# Patient Record
Sex: FEMALE | Race: WHITE | NOT HISPANIC OR LATINO
[De-identification: names, ages, dates, MRNs, and addresses within clinical notes are randomized per-mention and may not be internally consistent; named-entity substitution may affect disease eponyms.]

---

## 2016-04-06 RX ORDER — DOCUSATE SODIUM 100 MG
1 CAPSULE ORAL
Qty: 0 | Refills: 0 | DISCHARGE
Start: 2016-04-06

## 2017-04-11 ENCOUNTER — APPOINTMENT (OUTPATIENT)
Dept: VASCULAR SURGERY | Facility: CLINIC | Age: 72
End: 2017-04-11

## 2017-04-11 VITALS
HEART RATE: 74 BPM | SYSTOLIC BLOOD PRESSURE: 152 MMHG | DIASTOLIC BLOOD PRESSURE: 78 MMHG | HEIGHT: 64 IN | BODY MASS INDEX: 50.02 KG/M2 | WEIGHT: 293 LBS

## 2017-08-08 ENCOUNTER — APPOINTMENT (OUTPATIENT)
Dept: VASCULAR SURGERY | Facility: CLINIC | Age: 72
End: 2017-08-08
Payer: MEDICARE

## 2017-08-08 VITALS
BODY MASS INDEX: 50.02 KG/M2 | TEMPERATURE: 98.9 F | DIASTOLIC BLOOD PRESSURE: 84 MMHG | WEIGHT: 293 LBS | SYSTOLIC BLOOD PRESSURE: 138 MMHG | HEIGHT: 64 IN | HEART RATE: 70 BPM

## 2017-08-08 PROCEDURE — 99213 OFFICE O/P EST LOW 20 MIN: CPT

## 2018-01-30 ENCOUNTER — APPOINTMENT (OUTPATIENT)
Dept: VASCULAR SURGERY | Facility: CLINIC | Age: 73
End: 2018-01-30

## 2018-02-20 ENCOUNTER — APPOINTMENT (OUTPATIENT)
Dept: VASCULAR SURGERY | Facility: CLINIC | Age: 73
End: 2018-02-20
Payer: MEDICARE

## 2018-02-20 VITALS — HEIGHT: 64 IN | SYSTOLIC BLOOD PRESSURE: 141 MMHG | DIASTOLIC BLOOD PRESSURE: 93 MMHG | HEART RATE: 73 BPM

## 2018-02-20 PROCEDURE — 99213 OFFICE O/P EST LOW 20 MIN: CPT

## 2018-07-09 ENCOUNTER — APPOINTMENT (OUTPATIENT)
Dept: VASCULAR SURGERY | Facility: CLINIC | Age: 73
End: 2018-07-09
Payer: MEDICARE

## 2018-07-09 VITALS
TEMPERATURE: 98 F | HEIGHT: 64 IN | DIASTOLIC BLOOD PRESSURE: 67 MMHG | BODY MASS INDEX: 50.02 KG/M2 | WEIGHT: 293 LBS | SYSTOLIC BLOOD PRESSURE: 164 MMHG | HEART RATE: 77 BPM

## 2018-07-09 PROCEDURE — 99213 OFFICE O/P EST LOW 20 MIN: CPT

## 2018-10-02 ENCOUNTER — APPOINTMENT (OUTPATIENT)
Dept: VASCULAR SURGERY | Facility: CLINIC | Age: 73
End: 2018-10-02

## 2019-05-01 ENCOUNTER — APPOINTMENT (OUTPATIENT)
Dept: VASCULAR SURGERY | Facility: CLINIC | Age: 74
End: 2019-05-01
Payer: MEDICARE

## 2019-05-01 VITALS
WEIGHT: 293 LBS | BODY MASS INDEX: 50.02 KG/M2 | TEMPERATURE: 98 F | HEIGHT: 64 IN | DIASTOLIC BLOOD PRESSURE: 80 MMHG | HEART RATE: 81 BPM | SYSTOLIC BLOOD PRESSURE: 163 MMHG

## 2019-05-01 PROCEDURE — 93970 EXTREMITY STUDY: CPT

## 2019-05-01 PROCEDURE — 93979 VASCULAR STUDY: CPT

## 2019-05-01 PROCEDURE — 99214 OFFICE O/P EST MOD 30 MIN: CPT

## 2019-05-14 ENCOUNTER — APPOINTMENT (OUTPATIENT)
Dept: VASCULAR SURGERY | Facility: CLINIC | Age: 74
End: 2019-05-14
Payer: MEDICARE

## 2019-05-14 VITALS
HEART RATE: 76 BPM | TEMPERATURE: 97.6 F | SYSTOLIC BLOOD PRESSURE: 127 MMHG | OXYGEN SATURATION: 81 % | HEIGHT: 64 IN | WEIGHT: 293 LBS | BODY MASS INDEX: 50.02 KG/M2 | DIASTOLIC BLOOD PRESSURE: 61 MMHG

## 2019-05-14 DIAGNOSIS — L85.3 XEROSIS CUTIS: ICD-10-CM

## 2019-05-14 PROCEDURE — 99213 OFFICE O/P EST LOW 20 MIN: CPT

## 2019-08-20 ENCOUNTER — APPOINTMENT (OUTPATIENT)
Dept: VASCULAR SURGERY | Facility: CLINIC | Age: 74
End: 2019-08-20
Payer: MEDICARE

## 2019-08-20 VITALS
SYSTOLIC BLOOD PRESSURE: 148 MMHG | OXYGEN SATURATION: 88 % | BODY MASS INDEX: 50.02 KG/M2 | DIASTOLIC BLOOD PRESSURE: 70 MMHG | TEMPERATURE: 98.9 F | WEIGHT: 293 LBS | HEART RATE: 81 BPM | HEIGHT: 64 IN

## 2019-08-20 DIAGNOSIS — R60.0 LOCALIZED EDEMA: ICD-10-CM

## 2019-08-20 PROCEDURE — 99213 OFFICE O/P EST LOW 20 MIN: CPT

## 2019-08-20 RX ORDER — HALOBETASOL PROPIONATE 0.5 MG/G
0.05 CREAM TOPICAL TWICE DAILY
Qty: 2 | Refills: 4 | Status: ACTIVE | COMMUNITY
Start: 2019-08-20 | End: 1900-01-01

## 2019-09-24 ENCOUNTER — APPOINTMENT (OUTPATIENT)
Dept: VASCULAR SURGERY | Facility: CLINIC | Age: 74
End: 2019-09-24

## 2019-10-15 ENCOUNTER — APPOINTMENT (OUTPATIENT)
Dept: VASCULAR SURGERY | Facility: CLINIC | Age: 74
End: 2019-10-15

## 2020-02-23 ENCOUNTER — INPATIENT (INPATIENT)
Facility: HOSPITAL | Age: 75
LOS: 7 days | Discharge: HOSPICE | DRG: 208 | End: 2020-03-02
Attending: INTERNAL MEDICINE | Admitting: INTERNAL MEDICINE
Payer: MEDICARE

## 2020-02-23 VITALS
HEART RATE: 84 BPM | DIASTOLIC BLOOD PRESSURE: 83 MMHG | RESPIRATION RATE: 18 BRPM | TEMPERATURE: 98 F | WEIGHT: 293 LBS | OXYGEN SATURATION: 97 % | SYSTOLIC BLOOD PRESSURE: 144 MMHG | HEIGHT: 63 IN

## 2020-02-23 DIAGNOSIS — Z98.89 OTHER SPECIFIED POSTPROCEDURAL STATES: Chronic | ICD-10-CM

## 2020-02-23 DIAGNOSIS — Z90.710 ACQUIRED ABSENCE OF BOTH CERVIX AND UTERUS: Chronic | ICD-10-CM

## 2020-02-23 LAB
ALBUMIN SERPL ELPH-MCNC: 3.8 G/DL — SIGNIFICANT CHANGE UP (ref 3.3–5)
ALP SERPL-CCNC: 94 U/L — SIGNIFICANT CHANGE UP (ref 40–120)
ALT FLD-CCNC: 25 U/L — SIGNIFICANT CHANGE UP (ref 10–45)
ANION GAP SERPL CALC-SCNC: 9 MMOL/L — SIGNIFICANT CHANGE UP (ref 5–17)
APTT BLD: 24.7 SEC — LOW (ref 27.5–36.3)
AST SERPL-CCNC: 16 U/L — SIGNIFICANT CHANGE UP (ref 10–40)
BASOPHILS # BLD AUTO: 0 K/UL — SIGNIFICANT CHANGE UP (ref 0–0.2)
BASOPHILS NFR BLD AUTO: 0 % — SIGNIFICANT CHANGE UP (ref 0–2)
BILIRUB SERPL-MCNC: 0.5 MG/DL — SIGNIFICANT CHANGE UP (ref 0.2–1.2)
BUN SERPL-MCNC: 38 MG/DL — HIGH (ref 7–23)
CALCIUM SERPL-MCNC: 9.1 MG/DL — SIGNIFICANT CHANGE UP (ref 8.4–10.5)
CHLORIDE SERPL-SCNC: 105 MMOL/L — SIGNIFICANT CHANGE UP (ref 96–108)
CO2 SERPL-SCNC: 31 MMOL/L — SIGNIFICANT CHANGE UP (ref 22–31)
CREAT SERPL-MCNC: 1.63 MG/DL — HIGH (ref 0.5–1.3)
EOSINOPHIL # BLD AUTO: 0.37 K/UL — SIGNIFICANT CHANGE UP (ref 0–0.5)
EOSINOPHIL NFR BLD AUTO: 4 % — SIGNIFICANT CHANGE UP (ref 0–6)
GAS PNL BLDV: SIGNIFICANT CHANGE UP
GLUCOSE SERPL-MCNC: 138 MG/DL — HIGH (ref 70–99)
HCT VFR BLD CALC: 38.4 % — SIGNIFICANT CHANGE UP (ref 34.5–45)
HGB BLD-MCNC: 10.8 G/DL — LOW (ref 11.5–15.5)
INR BLD: 1.28 RATIO — HIGH (ref 0.88–1.16)
LYMPHOCYTES # BLD AUTO: 0.65 K/UL — LOW (ref 1–3.3)
LYMPHOCYTES # BLD AUTO: 7 % — LOW (ref 13–44)
MCHC RBC-ENTMCNC: 28.1 GM/DL — LOW (ref 32–36)
MCHC RBC-ENTMCNC: 30.1 PG — SIGNIFICANT CHANGE UP (ref 27–34)
MCV RBC AUTO: 107 FL — HIGH (ref 80–100)
MONOCYTES # BLD AUTO: 0.28 K/UL — SIGNIFICANT CHANGE UP (ref 0–0.9)
MONOCYTES NFR BLD AUTO: 3 % — SIGNIFICANT CHANGE UP (ref 2–14)
NEUTROPHILS # BLD AUTO: 7.75 K/UL — HIGH (ref 1.8–7.4)
NEUTROPHILS NFR BLD AUTO: 82 % — HIGH (ref 43–77)
NT-PROBNP SERPL-SCNC: 469 PG/ML — HIGH (ref 0–300)
PLATELET # BLD AUTO: 190 K/UL — SIGNIFICANT CHANGE UP (ref 150–400)
POTASSIUM SERPL-MCNC: 6.4 MMOL/L — CRITICAL HIGH (ref 3.5–5.3)
POTASSIUM SERPL-SCNC: 6.4 MMOL/L — CRITICAL HIGH (ref 3.5–5.3)
PROT SERPL-MCNC: 7.3 G/DL — SIGNIFICANT CHANGE UP (ref 6–8.3)
PROTHROM AB SERPL-ACNC: 14.7 SEC — HIGH (ref 10–12.9)
RBC # BLD: 3.59 M/UL — LOW (ref 3.8–5.2)
RBC # FLD: 17.5 % — HIGH (ref 10.3–14.5)
SODIUM SERPL-SCNC: 145 MMOL/L — SIGNIFICANT CHANGE UP (ref 135–145)
TROPONIN T, HIGH SENSITIVITY RESULT: 11 NG/L — SIGNIFICANT CHANGE UP (ref 0–51)
TROPONIN T, HIGH SENSITIVITY RESULT: 11 NG/L — SIGNIFICANT CHANGE UP (ref 0–51)
WBC # BLD: 9.23 K/UL — SIGNIFICANT CHANGE UP (ref 3.8–10.5)
WBC # FLD AUTO: 9.23 K/UL — SIGNIFICANT CHANGE UP (ref 3.8–10.5)

## 2020-02-23 PROCEDURE — 99291 CRITICAL CARE FIRST HOUR: CPT | Mod: GC

## 2020-02-23 PROCEDURE — 71045 X-RAY EXAM CHEST 1 VIEW: CPT | Mod: 26

## 2020-02-23 RX ORDER — FUROSEMIDE 40 MG
20 TABLET ORAL ONCE
Refills: 0 | Status: COMPLETED | OUTPATIENT
Start: 2020-02-23 | End: 2020-02-23

## 2020-02-23 RX ORDER — DEXTROSE 50 % IN WATER 50 %
25 SYRINGE (ML) INTRAVENOUS ONCE
Refills: 0 | Status: COMPLETED | OUTPATIENT
Start: 2020-02-23 | End: 2020-02-23

## 2020-02-23 RX ORDER — SODIUM ZIRCONIUM CYCLOSILICATE 10 G/10G
10 POWDER, FOR SUSPENSION ORAL ONCE
Refills: 0 | Status: DISCONTINUED | OUTPATIENT
Start: 2020-02-23 | End: 2020-02-23

## 2020-02-23 RX ORDER — IPRATROPIUM/ALBUTEROL SULFATE 18-103MCG
3 AEROSOL WITH ADAPTER (GRAM) INHALATION ONCE
Refills: 0 | Status: COMPLETED | OUTPATIENT
Start: 2020-02-23 | End: 2020-02-23

## 2020-02-23 RX ORDER — INSULIN HUMAN 100 [IU]/ML
10 INJECTION, SOLUTION SUBCUTANEOUS ONCE
Refills: 0 | Status: COMPLETED | OUTPATIENT
Start: 2020-02-23 | End: 2020-02-23

## 2020-02-23 RX ADMIN — Medication 3 MILLILITER(S): at 20:42

## 2020-02-23 RX ADMIN — Medication 20 MILLIGRAM(S): at 21:33

## 2020-02-23 RX ADMIN — INSULIN HUMAN 10 UNIT(S): 100 INJECTION, SOLUTION SUBCUTANEOUS at 21:28

## 2020-02-23 RX ADMIN — Medication 25 MILLILITER(S): at 21:28

## 2020-02-23 NOTE — ED ADULT NURSE REASSESSMENT NOTE - NS ED NURSE REASSESS COMMENT FT1
Obrien catheter inserted using sterile technique. Second RN present to confirm sterility. Patient tolerated well. Bedside drainage to gravity. Stat lock in place.

## 2020-02-23 NOTE — ED PROVIDER NOTE - PMH
Diabetes mellitus type 2, controlled    HLD (hyperlipidemia)    Hypertension    Obesity hypoventilation syndrome    Obesity, morbid    FREDI on CPAP    Ventral hernia

## 2020-02-23 NOTE — ED PROVIDER NOTE - OBJECTIVE STATEMENT
73 y/o female h/o tanner obesity hypoventilation syndrome, ckd3, dm2, LE lymphedema, on CPAP at night but intermittently compliant, pw worsening SOB and LE edema. Pt denies cough or recent URI sx, denies f/c/n/v/d. Pt reports worsening SOB at rest and with exertion. Pt found to be hypoxic in the 70s when EMS arrived, and improved to 100% O2 w/ NRB. Pt reports that she has been noncompliant with her CPAP for the last several weeks as her  has been in the hospital for a hip replacement and she does not know how to put on her CPAP mask or work the machine.

## 2020-02-23 NOTE — ED PROVIDER NOTE - CARE PLAN
Principal Discharge DX:	Shortness of breath Principal Discharge DX:	Shortness of breath  Secondary Diagnosis:	Hypercapnemia  Secondary Diagnosis:	Obesity hypoventilation syndrome  Secondary Diagnosis:	Acidemia

## 2020-02-23 NOTE — ED PROVIDER NOTE - CLINICAL SUMMARY MEDICAL DECISION MAKING FREE TEXT BOX
Pt w/ severe obesity hypoventilation syndrome and sleep apnea, dm, ckd, p/w worsening SOB and  and worsening hypoxia, likely 2/2 stopping use of CPAP recently, pt lung exam relatively benign besides mild rales, will obtain cardiac w/u but unlikely fluid overload or ACS as etiology, pt to require admission for pulmonary w/u and additional cardiac w/u. Pt hypoxia improving on O2 NC here, no concern for impending resp. collapse at this time -[place] Pt w/ severe obesity hypoventilation syndrome and sleep apnea, dm, ckd, p/w worsening SOB and  and worsening hypoxia, likely 2/2 stopping use of CPAP recently, pt lung exam relatively benign besides mild rales, will obtain cardiac w/u but unlikely fluid overload or ACS as etiology, pt to require admission for pulmonary w/u and additional cardiac w/u. Pt hypoxia improving on O2 NC here, no concern for impending resp. collapse at this time -Balbir Sanchez MD: Pt is a 75 y/o female with PMH FREDI, obesity hypoventilation syndrome, ckd3, dm2, LE lymphedema, on CPAP at night but intermittently compliant, pw worsening SOB and LE edema. Pt denies cough or recent URI sx, denies f/c/n/v/d. Pt reports worsening SOB at rest and with exertion. Pt found to be hypoxic in the 70s when EMS arrived, and improved to 100% O2 w/ NRB. Pt reports that she has been noncompliant with her CPAP for the last several weeks as her  has been in the hospital for a hip replacement and she does not know how to put on her CPAP mask or work the machine. Denies f/c. Pt with chronic lymphedema to b/l LE, states no different from normal. No recent hospitalizations, trauma. Pt placed on BIPAP on arrival for hypoxia and lethargy concerning for possible hypercapnea. Ddx includes, however, is not limited to: obesity hyperventilation syndrome, hypercarbic respiratory failure, CHF, PNA, viral syndrome, ACS, COPD, other. Plan: basic labs, vbg, cardiac w/u, PE w/u with CTA if creatinine allows, MICU consult, TBA likely ICU setting

## 2020-02-23 NOTE — ED ADULT NURSE NOTE - OBJECTIVE STATEMENT
74 year old female with a PMH of HTN, DM, CKD, and morbid obesity comes to the ED c/o SOB. Patient states acute onset of SOB began today at rest and became progressively worse throughout the day and became increasingly weak and drowsy. Per EMS, patient was satting 72% on RA, patient up to 100% after NRB placed. Upon arrival, patient O2 sat 68% on RA, patient placed on 6L NC and O2 came back up to 92. Per family, patient has not been compliant with daily medications, oxygen therapy, CPAP machine since  has been in hospital and rehab. Patient states, "I don't know how to put the mask on." Shallow, tachypneic respirations noted upon arrival, lung sounds CTA. Abdomen is round, soft, nontender and nondistended. Peripheral pulses are present with 3+ pitting edema noted b/l to LE. Skin is warm, dry and intact with no breakdown noted. Patient denies CP, f/c, URI symptoms/cough, n/v/d, dizziness/lightheadedness, numbness/tingling/weakness, hematuria/dysuria/frequency at this time.

## 2020-02-23 NOTE — ED PROVIDER NOTE - PROGRESS NOTE DETAILS
Balbir, pgy3: pt blood gas not improving after BIPAP, still significantly acididotic and hypercapneic, although pt likely chronic retainer. Will have MICU eval pt, and in meantime go up on IPAP settings Balbir, pgy3: minimal improvement on blood gas despite increasing BIPAP settings, MICU accepting patient

## 2020-02-23 NOTE — ED PROVIDER NOTE - PSH
S/P hernia repair  ventral hernia repair Jan 2016  S/P hernia repair  First ventral hernia repair in 1996 at [facility], Second ventral hernia repair with mesh at [facility] in 2007  S/P hysterectomy

## 2020-02-24 DIAGNOSIS — R06.02 SHORTNESS OF BREATH: ICD-10-CM

## 2020-02-24 LAB
ALBUMIN SERPL ELPH-MCNC: 3.4 G/DL — SIGNIFICANT CHANGE UP (ref 3.3–5)
ALBUMIN SERPL ELPH-MCNC: 3.5 G/DL — SIGNIFICANT CHANGE UP (ref 3.3–5)
ALP SERPL-CCNC: 84 U/L — SIGNIFICANT CHANGE UP (ref 40–120)
ALP SERPL-CCNC: 85 U/L — SIGNIFICANT CHANGE UP (ref 40–120)
ALT FLD-CCNC: 19 U/L — SIGNIFICANT CHANGE UP (ref 10–45)
ALT FLD-CCNC: 22 U/L — SIGNIFICANT CHANGE UP (ref 10–45)
ANION GAP SERPL CALC-SCNC: 11 MMOL/L — SIGNIFICANT CHANGE UP (ref 5–17)
ANION GAP SERPL CALC-SCNC: 5 MMOL/L — SIGNIFICANT CHANGE UP (ref 5–17)
ANISOCYTOSIS BLD QL: SLIGHT — SIGNIFICANT CHANGE UP
AST SERPL-CCNC: 13 U/L — SIGNIFICANT CHANGE UP (ref 10–40)
AST SERPL-CCNC: 15 U/L — SIGNIFICANT CHANGE UP (ref 10–40)
BASE EXCESS BLDA CALC-SCNC: 3.4 MMOL/L — HIGH (ref -2–2)
BASE EXCESS BLDA CALC-SCNC: 4.5 MMOL/L — HIGH (ref -2–2)
BASOPHILS # BLD AUTO: 0 K/UL — SIGNIFICANT CHANGE UP (ref 0–0.2)
BASOPHILS NFR BLD AUTO: 0 % — SIGNIFICANT CHANGE UP (ref 0–2)
BILIRUB SERPL-MCNC: 0.5 MG/DL — SIGNIFICANT CHANGE UP (ref 0.2–1.2)
BILIRUB SERPL-MCNC: 0.6 MG/DL — SIGNIFICANT CHANGE UP (ref 0.2–1.2)
BUN SERPL-MCNC: 38 MG/DL — HIGH (ref 7–23)
BUN SERPL-MCNC: 41 MG/DL — HIGH (ref 7–23)
CALCIUM SERPL-MCNC: 9 MG/DL — SIGNIFICANT CHANGE UP (ref 8.4–10.5)
CALCIUM SERPL-MCNC: 9.2 MG/DL — SIGNIFICANT CHANGE UP (ref 8.4–10.5)
CHLORIDE SERPL-SCNC: 106 MMOL/L — SIGNIFICANT CHANGE UP (ref 96–108)
CHLORIDE SERPL-SCNC: 108 MMOL/L — SIGNIFICANT CHANGE UP (ref 96–108)
CO2 BLDA-SCNC: 35 MMOL/L — HIGH (ref 22–30)
CO2 BLDA-SCNC: 37 MMOL/L — HIGH (ref 22–30)
CO2 SERPL-SCNC: 27 MMOL/L — SIGNIFICANT CHANGE UP (ref 22–31)
CO2 SERPL-SCNC: 31 MMOL/L — SIGNIFICANT CHANGE UP (ref 22–31)
CREAT SERPL-MCNC: 1.56 MG/DL — HIGH (ref 0.5–1.3)
CREAT SERPL-MCNC: 1.79 MG/DL — HIGH (ref 0.5–1.3)
ELLIPTOCYTES BLD QL SMEAR: SLIGHT — SIGNIFICANT CHANGE UP
EOSINOPHIL # BLD AUTO: 0 K/UL — SIGNIFICANT CHANGE UP (ref 0–0.5)
EOSINOPHIL NFR BLD AUTO: 0 % — SIGNIFICANT CHANGE UP (ref 0–6)
ESTIMATED AVERAGE GLUCOSE: 146 MG/DL — HIGH (ref 68–114)
GAS PNL BLDA: SIGNIFICANT CHANGE UP
GLUCOSE BLDC GLUCOMTR-MCNC: 146 MG/DL — HIGH (ref 70–99)
GLUCOSE BLDC GLUCOMTR-MCNC: 91 MG/DL — SIGNIFICANT CHANGE UP (ref 70–99)
GLUCOSE BLDC GLUCOMTR-MCNC: 93 MG/DL — SIGNIFICANT CHANGE UP (ref 70–99)
GLUCOSE SERPL-MCNC: 108 MG/DL — HIGH (ref 70–99)
GLUCOSE SERPL-MCNC: 87 MG/DL — SIGNIFICANT CHANGE UP (ref 70–99)
HBA1C BLD-MCNC: 6.7 % — HIGH (ref 4–5.6)
HCO3 BLDA-SCNC: 32 MMOL/L — HIGH (ref 21–29)
HCO3 BLDA-SCNC: 34 MMOL/L — HIGH (ref 21–29)
HCT VFR BLD CALC: 36.5 % — SIGNIFICANT CHANGE UP (ref 34.5–45)
HCV AB S/CO SERPL IA: 0.14 S/CO — SIGNIFICANT CHANGE UP (ref 0–0.99)
HCV AB SERPL-IMP: SIGNIFICANT CHANGE UP
HGB BLD-MCNC: 10.2 G/DL — LOW (ref 11.5–15.5)
HOROWITZ INDEX BLDA+IHG-RTO: 40 — SIGNIFICANT CHANGE UP
HOROWITZ INDEX BLDA+IHG-RTO: 50 — SIGNIFICANT CHANGE UP
INTUBATED: SIGNIFICANT CHANGE UP
INTUBATED: YES — SIGNIFICANT CHANGE UP
LYMPHOCYTES # BLD AUTO: 0.45 K/UL — LOW (ref 1–3.3)
LYMPHOCYTES # BLD AUTO: 5.2 % — LOW (ref 13–44)
MACROCYTES BLD QL: SIGNIFICANT CHANGE UP
MAGNESIUM SERPL-MCNC: 2.4 MG/DL — SIGNIFICANT CHANGE UP (ref 1.6–2.6)
MAGNESIUM SERPL-MCNC: 2.4 MG/DL — SIGNIFICANT CHANGE UP (ref 1.6–2.6)
MANUAL SMEAR VERIFICATION: SIGNIFICANT CHANGE UP
MCHC RBC-ENTMCNC: 27.9 GM/DL — LOW (ref 32–36)
MCHC RBC-ENTMCNC: 30 PG — SIGNIFICANT CHANGE UP (ref 27–34)
MCV RBC AUTO: 107.4 FL — HIGH (ref 80–100)
MONOCYTES # BLD AUTO: 0.68 K/UL — SIGNIFICANT CHANGE UP (ref 0–0.9)
MONOCYTES NFR BLD AUTO: 7.8 % — SIGNIFICANT CHANGE UP (ref 2–14)
NEUTROPHILS # BLD AUTO: 7.51 K/UL — HIGH (ref 1.8–7.4)
NEUTROPHILS NFR BLD AUTO: 86.1 % — HIGH (ref 43–77)
OVALOCYTES BLD QL SMEAR: SLIGHT — SIGNIFICANT CHANGE UP
PCO2 BLDA: 83 MMHG — CRITICAL HIGH (ref 32–46)
PCO2 BLDA: 90 MMHG — CRITICAL HIGH (ref 32–46)
PH BLDA: 7.21 — LOW (ref 7.35–7.45)
PH BLDA: 7.22 — LOW (ref 7.35–7.45)
PHOSPHATE SERPL-MCNC: 4.7 MG/DL — HIGH (ref 2.5–4.5)
PHOSPHATE SERPL-MCNC: 5.8 MG/DL — HIGH (ref 2.5–4.5)
PLAT MORPH BLD: NORMAL — SIGNIFICANT CHANGE UP
PLATELET # BLD AUTO: 194 K/UL — SIGNIFICANT CHANGE UP (ref 150–400)
PO2 BLDA: 133 MMHG — HIGH (ref 74–108)
PO2 BLDA: 98 MMHG — SIGNIFICANT CHANGE UP (ref 74–108)
POLYCHROMASIA BLD QL SMEAR: SLIGHT — SIGNIFICANT CHANGE UP
POTASSIUM SERPL-MCNC: 5.6 MMOL/L — HIGH (ref 3.5–5.3)
POTASSIUM SERPL-MCNC: 6.2 MMOL/L — CRITICAL HIGH (ref 3.5–5.3)
POTASSIUM SERPL-SCNC: 5.6 MMOL/L — HIGH (ref 3.5–5.3)
POTASSIUM SERPL-SCNC: 6.2 MMOL/L — CRITICAL HIGH (ref 3.5–5.3)
PROT SERPL-MCNC: 6.8 G/DL — SIGNIFICANT CHANGE UP (ref 6–8.3)
PROT SERPL-MCNC: 7.2 G/DL — SIGNIFICANT CHANGE UP (ref 6–8.3)
RBC # BLD: 3.4 M/UL — LOW (ref 3.8–5.2)
RBC # FLD: 17.6 % — HIGH (ref 10.3–14.5)
RBC BLD AUTO: ABNORMAL
SAO2 % BLDA: 97 % — HIGH (ref 92–96)
SAO2 % BLDA: 99 % — HIGH (ref 92–96)
SODIUM SERPL-SCNC: 142 MMOL/L — SIGNIFICANT CHANGE UP (ref 135–145)
SODIUM SERPL-SCNC: 146 MMOL/L — HIGH (ref 135–145)
VARIANT LYMPHS # BLD: 0.9 % — SIGNIFICANT CHANGE UP (ref 0–6)
WBC # BLD: 8.72 K/UL — SIGNIFICANT CHANGE UP (ref 3.8–10.5)
WBC # FLD AUTO: 8.72 K/UL — SIGNIFICANT CHANGE UP (ref 3.8–10.5)

## 2020-02-24 PROCEDURE — 99291 CRITICAL CARE FIRST HOUR: CPT | Mod: 25

## 2020-02-24 PROCEDURE — 76604 US EXAM CHEST: CPT | Mod: 26,GC

## 2020-02-24 PROCEDURE — 71045 X-RAY EXAM CHEST 1 VIEW: CPT | Mod: 26

## 2020-02-24 PROCEDURE — 93306 TTE W/DOPPLER COMPLETE: CPT | Mod: 26

## 2020-02-24 PROCEDURE — 99291 CRITICAL CARE FIRST HOUR: CPT

## 2020-02-24 PROCEDURE — 31500 INSERT EMERGENCY AIRWAY: CPT | Mod: GC,59

## 2020-02-24 RX ORDER — DEXTROSE 50 % IN WATER 50 %
12.5 SYRINGE (ML) INTRAVENOUS ONCE
Refills: 0 | Status: DISCONTINUED | OUTPATIENT
Start: 2020-02-24 | End: 2020-03-02

## 2020-02-24 RX ORDER — FUROSEMIDE 40 MG
20 TABLET ORAL DAILY
Refills: 0 | Status: DISCONTINUED | OUTPATIENT
Start: 2020-02-24 | End: 2020-02-26

## 2020-02-24 RX ORDER — FUROSEMIDE 40 MG
40 TABLET ORAL ONCE
Refills: 0 | Status: COMPLETED | OUTPATIENT
Start: 2020-02-24 | End: 2020-02-24

## 2020-02-24 RX ORDER — METOPROLOL TARTRATE 50 MG
50 TABLET ORAL DAILY
Refills: 0 | Status: DISCONTINUED | OUTPATIENT
Start: 2020-02-24 | End: 2020-03-02

## 2020-02-24 RX ORDER — ASPIRIN/CALCIUM CARB/MAGNESIUM 324 MG
81 TABLET ORAL DAILY
Refills: 0 | Status: DISCONTINUED | OUTPATIENT
Start: 2020-02-24 | End: 2020-03-02

## 2020-02-24 RX ORDER — ALLOPURINOL 300 MG
300 TABLET ORAL DAILY
Refills: 0 | Status: DISCONTINUED | OUTPATIENT
Start: 2020-02-24 | End: 2020-03-02

## 2020-02-24 RX ORDER — DEXTROSE 50 % IN WATER 50 %
15 SYRINGE (ML) INTRAVENOUS ONCE
Refills: 0 | Status: DISCONTINUED | OUTPATIENT
Start: 2020-02-24 | End: 2020-03-02

## 2020-02-24 RX ORDER — CHLORHEXIDINE GLUCONATE 213 G/1000ML
1 SOLUTION TOPICAL
Refills: 0 | Status: DISCONTINUED | OUTPATIENT
Start: 2020-02-24 | End: 2020-02-26

## 2020-02-24 RX ORDER — PROPOFOL 10 MG/ML
15 INJECTION, EMULSION INTRAVENOUS
Qty: 500 | Refills: 0 | Status: DISCONTINUED | OUTPATIENT
Start: 2020-02-24 | End: 2020-02-25

## 2020-02-24 RX ORDER — LEVOTHYROXINE SODIUM 125 MCG
25 TABLET ORAL DAILY
Refills: 0 | Status: DISCONTINUED | OUTPATIENT
Start: 2020-02-24 | End: 2020-02-24

## 2020-02-24 RX ORDER — GLUCAGON INJECTION, SOLUTION 0.5 MG/.1ML
1 INJECTION, SOLUTION SUBCUTANEOUS ONCE
Refills: 0 | Status: DISCONTINUED | OUTPATIENT
Start: 2020-02-24 | End: 2020-03-02

## 2020-02-24 RX ORDER — LEVOTHYROXINE SODIUM 125 MCG
1 TABLET ORAL
Qty: 0 | Refills: 0 | DISCHARGE

## 2020-02-24 RX ORDER — INSULIN LISPRO 100/ML
VIAL (ML) SUBCUTANEOUS EVERY 6 HOURS
Refills: 0 | Status: DISCONTINUED | OUTPATIENT
Start: 2020-02-24 | End: 2020-02-25

## 2020-02-24 RX ORDER — HEPARIN SODIUM 5000 [USP'U]/ML
7500 INJECTION INTRAVENOUS; SUBCUTANEOUS EVERY 8 HOURS
Refills: 0 | Status: DISCONTINUED | OUTPATIENT
Start: 2020-02-24 | End: 2020-03-02

## 2020-02-24 RX ORDER — FUROSEMIDE 40 MG
40 TABLET ORAL DAILY
Refills: 0 | Status: DISCONTINUED | OUTPATIENT
Start: 2020-02-24 | End: 2020-02-24

## 2020-02-24 RX ORDER — ATORVASTATIN CALCIUM 80 MG/1
10 TABLET, FILM COATED ORAL AT BEDTIME
Refills: 0 | Status: DISCONTINUED | OUTPATIENT
Start: 2020-02-24 | End: 2020-03-02

## 2020-02-24 RX ORDER — NYSTATIN/TRIAMCINOLONE ACET
1 OINTMENT (GRAM) TOPICAL
Refills: 0 | Status: DISCONTINUED | OUTPATIENT
Start: 2020-02-24 | End: 2020-02-25

## 2020-02-24 RX ORDER — INSULIN LISPRO 100/ML
VIAL (ML) SUBCUTANEOUS
Refills: 0 | Status: DISCONTINUED | OUTPATIENT
Start: 2020-02-24 | End: 2020-02-24

## 2020-02-24 RX ORDER — LEVOTHYROXINE SODIUM 125 MCG
12.5 TABLET ORAL AT BEDTIME
Refills: 0 | Status: DISCONTINUED | OUTPATIENT
Start: 2020-02-24 | End: 2020-02-26

## 2020-02-24 RX ORDER — CHLORHEXIDINE GLUCONATE 213 G/1000ML
15 SOLUTION TOPICAL EVERY 12 HOURS
Refills: 0 | Status: DISCONTINUED | OUTPATIENT
Start: 2020-02-24 | End: 2020-02-25

## 2020-02-24 RX ORDER — MIDAZOLAM HYDROCHLORIDE 1 MG/ML
6 INJECTION, SOLUTION INTRAMUSCULAR; INTRAVENOUS ONCE
Refills: 0 | Status: DISCONTINUED | OUTPATIENT
Start: 2020-02-24 | End: 2020-02-24

## 2020-02-24 RX ORDER — SODIUM ZIRCONIUM CYCLOSILICATE 10 G/10G
10 POWDER, FOR SUSPENSION ORAL ONCE
Refills: 0 | Status: COMPLETED | OUTPATIENT
Start: 2020-02-24 | End: 2020-02-24

## 2020-02-24 RX ORDER — DOXAZOSIN MESYLATE 4 MG
4 TABLET ORAL AT BEDTIME
Refills: 0 | Status: DISCONTINUED | OUTPATIENT
Start: 2020-02-24 | End: 2020-02-26

## 2020-02-24 RX ORDER — SODIUM CHLORIDE 9 MG/ML
1000 INJECTION, SOLUTION INTRAVENOUS
Refills: 0 | Status: DISCONTINUED | OUTPATIENT
Start: 2020-02-24 | End: 2020-03-02

## 2020-02-24 RX ORDER — NOREPINEPHRINE BITARTRATE/D5W 8 MG/250ML
0.15 PLASTIC BAG, INJECTION (ML) INTRAVENOUS
Qty: 8 | Refills: 0 | Status: DISCONTINUED | OUTPATIENT
Start: 2020-02-24 | End: 2020-02-25

## 2020-02-24 RX ADMIN — PROPOFOL 13.66 MICROGRAM(S)/KG/MIN: 10 INJECTION, EMULSION INTRAVENOUS at 15:31

## 2020-02-24 RX ADMIN — PROPOFOL 13.66 MICROGRAM(S)/KG/MIN: 10 INJECTION, EMULSION INTRAVENOUS at 21:04

## 2020-02-24 RX ADMIN — HEPARIN SODIUM 7500 UNIT(S): 5000 INJECTION INTRAVENOUS; SUBCUTANEOUS at 21:03

## 2020-02-24 RX ADMIN — CHLORHEXIDINE GLUCONATE 15 MILLILITER(S): 213 SOLUTION TOPICAL at 17:04

## 2020-02-24 RX ADMIN — HEPARIN SODIUM 7500 UNIT(S): 5000 INJECTION INTRAVENOUS; SUBCUTANEOUS at 05:27

## 2020-02-24 RX ADMIN — HEPARIN SODIUM 7500 UNIT(S): 5000 INJECTION INTRAVENOUS; SUBCUTANEOUS at 14:03

## 2020-02-24 RX ADMIN — Medication 4 MILLIGRAM(S): at 21:03

## 2020-02-24 RX ADMIN — Medication 20 MILLIGRAM(S): at 00:29

## 2020-02-24 RX ADMIN — Medication 42.69 MICROGRAM(S)/KG/MIN: at 15:31

## 2020-02-24 RX ADMIN — Medication 12.5 MICROGRAM(S): at 21:52

## 2020-02-24 RX ADMIN — CHLORHEXIDINE GLUCONATE 1 APPLICATION(S): 213 SOLUTION TOPICAL at 05:22

## 2020-02-24 RX ADMIN — ATORVASTATIN CALCIUM 10 MILLIGRAM(S): 80 TABLET, FILM COATED ORAL at 21:03

## 2020-02-24 RX ADMIN — Medication 40 MILLIGRAM(S): at 18:08

## 2020-02-24 RX ADMIN — MIDAZOLAM HYDROCHLORIDE 6 MILLIGRAM(S): 1 INJECTION, SOLUTION INTRAMUSCULAR; INTRAVENOUS at 15:25

## 2020-02-24 RX ADMIN — Medication 0.2 MILLIGRAM(S): at 21:03

## 2020-02-24 NOTE — CONSULT NOTE ADULT - ASSESSMENT
75 y/o female PMHx FREDI, obesity hypoventilation syndrome, DM2, HLD, LE lymphedema, on CPAP at night recently noncompliant, presents with worsening SOB and LE edema.    # acute hypercapnic and hypoxic resp failure   likely 2/2 recent non compliance  appreciate ICU care  cont NIV  serial ABGs  pulmonologist Dr. Porter    # LANE  last creat 1.2 in 12/2019  cont to trend  strict i os    # HTN   on clonidine and toprol XL, may need to hold as BP borderline, cont to trend  IV lasix if BP can tolerate  ordered for TTE  CXR pulm edema    # hypothyroidism  takes synthroid 25mcg qd per [facility] EMR    # DM2   hold oral meds  SSI  check a1c    PCP Dr Echavarria ([facility]) - will update    Thank you for this consult. Please call [facility] with questions [phone].

## 2020-02-24 NOTE — CHART NOTE - NSCHARTNOTEFT_GEN_A_CORE
: Dr. Stephens    INDICATION: respiratory failure    PROCEDURE:  [ ] LIMITED ECHO  [x] LIMITED CHEST  [ ] LIMITED RETROPERITONEAL  [ ] LIMITED ABDOMINAL  [ ] LIMITED DVT  [ ] NEEDLE GUIDANCE VASCULAR  [ ] NEEDLE GUIDANCE THORACENTESIS  [ ] NEEDLE GUIDANCE PARACENTESIS  [ ] NEEDLE GUIDANCE PERICARDIOCENTESIS  [ ] OTHER    FINDINGS:  A line pattern anteriorly b/l  Minimal B lines at lung bases    INTERPRETATION:  Normal aeration pattern with basilar atelectasis    Isma Stephens PGY-4  Pulmonary/Critical Care Fellow  Pager: [phone] (SANDY) [phone] (NS)  Pulmonary Spectra #[id number] (NS) / [phone] (SANDY)

## 2020-02-24 NOTE — H&P ADULT - NSHPLABSRESULTS_GEN_ALL_CORE
10.8   9.23  )-----------( 190      ( 23 Feb 2020 20:17 )             38.4       02-23    146<H>  |  106  |  38<H>  ----------------------------<  162<H>  5.5<H>   |  31  |  1.58<H>    Ca    8.9      23 Feb 2020 21:59    TPro  6.9  /  Alb  3.6  /  TBili  0.5  /  DBili  x   /  AST  13  /  ALT  24  /  AlkPhos  89  02-23      PT/INR - ( 23 Feb 2020 20:26 )   PT: 14.7 sec;   INR: 1.28 ratio         PTT - ( 23 Feb 2020 20:26 )  PTT:24.7 sec

## 2020-02-24 NOTE — CONSULT NOTE ADULT - SUBJECTIVE AND OBJECTIVE BOX
Patient is a 74y old  Female who presents with a chief complaint of acute hypercarbic respiratory failure (24 Feb 2020 02:57)      HPI:  75 y/o female h/o tanner obesity hypoventilation syndrome, ckd3, dm2, LE lymphedema, on CPAP at night but presents with worsening SOB and LE edema. Pt became acutely SOB at rest today, progressively worsened.  Pt endorses lethargy.  Pt denies cough or recent URI sx, denies cp, f/c/n/v/d.  Pt found to be hypoxic in the 70s on RA when EMS arrived, and improved to 100% O2 w/ NRB. Pt reports that she has been noncompliant with her CPAP for the last several weeks as her  has been in the hospital for a hip replacement and she does not know how to put on her CPAP mask or work the machine. (24 Feb 2020 02:57)      PAST MEDICAL & SURGICAL HISTORY:  Obesity hypoventilation syndrome  Obesity, morbid  TANNER on CPAP  Ventral hernia  HLD (hyperlipidemia)  Diabetes mellitus type 2, controlled  Hypertension  S/P hernia repair: ventral hernia repair Jan 2016  S/P hernia repair: First ventral hernia repair in 1996 at [facility], Second ventral hernia repair with mesh at [facility] in 2007  S/P hysterectomy      FAMILY HISTORY:  Family history of diabetes mellitus in father      SOCIAL HISTORY:    Allergies    codeine (Rash)    Intolerances        Review of Systems:  unable to provide ROS 2/2 lethargy  	    SUBJECTIVE / OVERNIGHT EVENTS:  lethargic on CPAP but arousable. remains on cpap.    Vital Signs Last 24 Hrs  T(C): 37 (24 Feb 2020 08:00), Max: 37 (24 Feb 2020 08:00)  T(F): 98.6 (24 Feb 2020 08:00), Max: 98.6 (24 Feb 2020 08:00)  HR: 83 (24 Feb 2020 11:00) (72 - 87)  BP: 111/59 (24 Feb 2020 11:00) (100/53 - 146/62)  BP(mean): 83 (24 Feb 2020 11:00) (73 - 89)  RR: 25 (24 Feb 2020 11:00) (17 - 30)  SpO2: 93% (24 Feb 2020 11:00) (90% - 100%)  I&O's Summary    23 Feb 2020 07:01  -  24 Feb 2020 07:00  --------------------------------------------------------  IN: 0 mL / OUT: 460 mL / NET: -460 mL    24 Feb 2020 07:01  -  24 Feb 2020 11:28  --------------------------------------------------------  IN: 0 mL / OUT: 135 mL / NET: -135 mL        PHYSICAL EXAM:  GENERAL: NAD, Comfortable on bipap, morbid obesity  HEAD:  Atraumatic, Normocephalic  CHEST/LUNG: decreased BS,   HEART: Regular rate and rhythm; No murmurs, rubs, or gallops  ABDOMEN: Soft, Nontender, Nondistended; Bowel sounds present  Neuro: lethargic but no focal deficit  EXTREMITIES:  2+ Peripheral Pulses, bl lymphedema  SKIN: No rashes or lesions    LABS:                        10.2   8.72  )-----------( 194      ( 24 Feb 2020 04:10 )             36.5     02-24    146<H>  |  108  |  38<H>  ----------------------------<  87  5.6<H>   |  27  |  1.56<H>    Ca    9.0      24 Feb 2020 04:10  Phos  4.7     02-24  Mg     2.4     02-24    TPro  6.8  /  Alb  3.4  /  TBili  0.5  /  DBili  x   /  AST  13  /  ALT  22  /  AlkPhos  85  02-24    PT/INR - ( 23 Feb 2020 20:26 )   PT: 14.7 sec;   INR: 1.28 ratio         PTT - ( 23 Feb 2020 20:26 )  PTT:24.7 sec  CAPILLARY BLOOD GLUCOSE      POCT Blood Glucose.: 91 mg/dL (24 Feb 2020 05:29)  POCT Blood Glucose.: 119 mg/dL (23 Feb 2020 21:26)            RADIOLOGY & ADDITIONAL TESTS:    Imaging Personally Reviewed:  [x] YES  [ ] NO    Consultant(s) Notes Reviewed:  [x] YES  [ ] NO      MEDICATIONS  (STANDING):  allopurinol 300 milliGRAM(s) Oral daily  aspirin  chewable 81 milliGRAM(s) Oral daily  atorvastatin 10 milliGRAM(s) Oral at bedtime  chlorhexidine 4% Liquid 1 Application(s) Topical <User Schedule>  cloNIDine 0.2 milliGRAM(s) Oral daily  dextrose 5%. 1000 milliLiter(s) (50 mL/Hr) IV Continuous <Continuous>  dextrose 50% Injectable 12.5 Gram(s) IV Push once  doxazosin 4 milliGRAM(s) Oral at bedtime  furosemide   Injectable 20 milliGRAM(s) IV Push daily  heparin  Injectable 7500 Unit(s) SubCutaneous every 8 hours  insulin lispro (HumaLOG) corrective regimen sliding scale   SubCutaneous every 6 hours  metoprolol succinate ER 50 milliGRAM(s) Oral daily    MEDICATIONS  (PRN):  dextrose 40% Gel 15 Gram(s) Oral once PRN Blood Glucose LESS THAN 70 milliGRAM(s)/deciLiter  glucagon  Injectable 1 milliGRAM(s) IntraMuscular once PRN Glucose <70 milliGRAM(s)/deciLiter      Care Discussed with Consultants/Other Providers [x] YES  [ ] NO    HEALTH ISSUES - PROBLEM Dx:

## 2020-02-24 NOTE — H&P ADULT - NSICDXPASTSURGICALHX_GEN_ALL_CORE_FT
PAST SURGICAL HISTORY:  S/P hernia repair First ventral hernia repair in 1996 at [facility], Second ventral hernia repair with mesh at [facility] in 2007    S/P hernia repair ventral hernia repair Jan 2016    S/P hysterectomy

## 2020-02-24 NOTE — H&P ADULT - NSICDXPASTMEDICALHX_GEN_ALL_CORE_FT
PAST MEDICAL HISTORY:  Diabetes mellitus type 2, controlled     HLD (hyperlipidemia)     Hypertension     Obesity hypoventilation syndrome     Obesity, morbid     FREDI on CPAP     Ventral hernia

## 2020-02-24 NOTE — H&P ADULT - ATTENDING COMMENTS
Pt is an obese 75 yo WF with h/o FREDI, obesity hypoventilation syndrome on nocturnal CPAP, DM, CKD and LE lymphedema presents with worsening SOB and LE edema. Pt became acutely SOB at rest today, progressively worsened.  Pt endorses lethargy.  Pt denies cough or recent URI sx, denies cp, f/c/n/v/d.  Pt found to be hypoxic in the 70s on RA when EMS arrived, and improved to 100% O2 w/ NRB. Pt reports that she has been noncompliant with her CPAP for the last several weeks as her  has been in the hospital for a hip replacement and she does not know how to put on her CPAP mask or work the machine. Pt is an obese 73 yo WF with h/o FREDI, obesity hypoventilation syndrome on nocturnal CPAP, HTN, HLD, DM, CKD and LE lymphedema presents with worsening SOB, lethargy and LE edema. Pt denies cough or recent URI symptoms. Upon EMS arrival pt found to be hypoxic in the 70s on RA and in the ER pt found to be hypercapneic; pt placed on BiPAP. CXR reveal pulm edema. Pt reports that she has been noncompliant with her CPAP for the last several weeks. Pt's hypercapnia improved minimally on BiPAP. Admitting dx: 1) Hypoxic and acute on chronic hypercapnia 2) Pulm edema           3) Hyperkalemia    Resp: Follow serial ABG and adjust BiPAP  ID: Low threshold to start Abx  CVS: If BP remains stable may resume pt's BB/ Cont diuresis/ Would check Echo  FEN: NPO while on BiPAP/ K borderline elevated; cont to follow  Endo: Follow FS and cover with Lispro  Renal: Follow BUN/Cr and UO  Social: Family at the bedside and updated    CCT: 40 min in conjunction with the resident    *Note pt seen/examined 2/23 and note written on 2/24*

## 2020-02-24 NOTE — CHART NOTE - NSCHARTNOTEFT_GEN_A_CORE
75 y/o female admitted for acute on chronic hypercapnic respiratory failure. PCO2 and acidemia with minimal improvement on BiPAP 17/7.. Pt switch to AVAPS peep 12 and max pressure 40cm H20.  Able to get better tidal volumes with AVAPS. However, pt became less responsive and no longer responsive to deep sternal rub. Abg again showed PC02 > 100. Pt electively intubated with nasal BiPAP in place. Grade 1 view.   Check ABG on current vent settings. Minimal sedation to see if patient wakes up after intubation. No evidence of infection. Will give Lasix. Pt with R > L  leg edema. Family gives h/o lymph edema. Will obtain Ultrasounf=d legs r/o DVT.    I have spent 45 min of critical care time not including procedures.      Priyank Gudino MD

## 2020-02-24 NOTE — H&P ADULT - ASSESSMENT
#Neuro  #CV    #Pulm  BIPAP  serial ABG    #Renal    #GI  NPO while on BIPA    #ID  afebrile, no leukocytosis    #Heme    #Endo #Neuro  lethargic upon admission  - mentating well. no deficits     #CV    #Pulm  FREDI, Obesity hypoventilation syndrome   Acute hypercarbic respiratory failure  - c/w BIPAP  - ABG 7.18/99/35/35  - serial ABGs    #Renal  SCr1.58       #GI  NPO while on BIPAP    #ID  afebrile, no leukocytosis    #Heme  anemia 10.8/38.4    #Endo  DM Type @  - ISS 75 y/o female h/o tanner obesity hypoventilation syndrome, ckd3, dm2, LE lymphedema, noncompliant with CPAP presented with worsening SOB and LE edema admitted for acute on chronic hypercarbic respiratory failure.    #Neuro  lethargic upon admission  - mentating well, no deficits     #CV  HTN  -consider TTE    #Pulm  TANNER, Obesity hypoventilation syndrome, non compliant with CPAP  Acute on chronic hypercarbic respiratory failure  - c/w BIPAP  - ABG 7.18/99/35/35  - serial ABGs    #Renal  SCr1.58    #GI  NPO while on BIPAP    #ID  afebrile, no leukocytosis    #Heme  anemia 10.8/38.4    #Endo  Hx of hypothyroidism on levothyroxine at home , unsure of dose  DM Type 2  - ISS 73 y/o female h/o tanner obesity hypoventilation syndrome, ckd3, dm2, LE lymphedema, noncompliant with CPAP presented with worsening SOB and LE edema admitted for acute on chronic hypercarbic respiratory failure.    #Neuro  lethargic upon admission  - mentating well, no deficits     #CV  HTN  - CXR pulm edema and b/l pleural effusions  - trops WNL,   - f/u TTE  - c/w home HTN meds clonidine 0.2mg. metoprolol 50mg, doxazosin 4mg   - lasix 40mg daily    #Pulm  TANNER, Obesity hypoventilation syndrome, non compliant with CPAP  Acute on chronic hypercarbic respiratory failure  - c/w BIPAP  - ABG 7.18/99/35/35  - serial ABGs  - CXR pulm edema and b/l pleural effusions    #Renal  - baseline in 2016 1.23, upon admission SCr 1.63  - LANE?  - monitor BMP    #GI  NPO while on BIPAP    #ID  afebrile, no leukocytosis    #Heme  anemia 10.8/38.4    #Endo  Hx of hypothyroidism on levothyroxine at home, unsure of dose  DM Type 2  - ISS    #PPX  - HSQ VTE ppx

## 2020-02-24 NOTE — H&P ADULT - NSHPREVIEWOFSYSTEMS_GEN_ALL_CORE

## 2020-02-24 NOTE — H&P ADULT - HISTORY OF PRESENT ILLNESS
75 y/o female h/o tanner obesity hypoventilation syndrome, ckd3, dm2, LE lymphedema, on CPAP at night but intermittently compliant, pw worsening SOB and LE edema. Pt denies cough or recent URI sx, denies f/c/n/v/d. Pt reports worsening SOB at rest and with exertion. Pt found to be hypoxic in the 70s when EMS arrived, and improved to 100% O2 w/ NRB. Pt reports that she has been noncompliant with her CPAP for the last several weeks as her  has been in the hospital for a hip replacement and she does not know how to put on her CPAP mask or work the machine. 73 y/o female h/o tanner obesity hypoventilation syndrome, ckd3, dm2, LE lymphedema, on CPAP at night but presents with worsening SOB and LE edema. Pt became acutely SOB at rest today, progressively worsened.  Pt endorses lethargy.  Pt denies cough or recent URI sx, denies cp, f/c/n/v/d.  Pt found to be hypoxic in the 70s on RA when EMS arrived, and improved to 100% O2 w/ NRB. Pt reports that she has been noncompliant with her CPAP for the last several weeks as her  has been in the hospital for a hip replacement and she does not know how to put on her CPAP mask or work the machine.

## 2020-02-24 NOTE — PROCEDURE NOTE - NSTRACHPOSTINTU_RESP_A_CORE
Appropriate capnography/Breath sounds equal/Chest excursion noted/Positive end tidal Co2 noted/Chest X-Ray/Breath sounds bilateral

## 2020-02-24 NOTE — H&P ADULT - NSHPOUTPATIENTPROVIDERS_GEN_ALL_CORE
PCP: Dr. Jericho Horne  Pulmonologist: Dr. Jose Luis Porter PCP: Dr. Jericho Horne  Pulmonologist: Dr. Ulysses Porter

## 2020-02-25 LAB
ALBUMIN SERPL ELPH-MCNC: 3.1 G/DL — LOW (ref 3.3–5)
ALP SERPL-CCNC: 76 U/L — SIGNIFICANT CHANGE UP (ref 40–120)
ALT FLD-CCNC: 19 U/L — SIGNIFICANT CHANGE UP (ref 10–45)
ANION GAP SERPL CALC-SCNC: 9 MMOL/L — SIGNIFICANT CHANGE UP (ref 5–17)
AST SERPL-CCNC: 20 U/L — SIGNIFICANT CHANGE UP (ref 10–40)
BASOPHILS # BLD AUTO: 0.04 K/UL — SIGNIFICANT CHANGE UP (ref 0–0.2)
BASOPHILS NFR BLD AUTO: 0.4 % — SIGNIFICANT CHANGE UP (ref 0–2)
BILIRUB SERPL-MCNC: 0.8 MG/DL — SIGNIFICANT CHANGE UP (ref 0.2–1.2)
BUN SERPL-MCNC: 47 MG/DL — HIGH (ref 7–23)
CALCIUM SERPL-MCNC: 9.3 MG/DL — SIGNIFICANT CHANGE UP (ref 8.4–10.5)
CHLORIDE SERPL-SCNC: 104 MMOL/L — SIGNIFICANT CHANGE UP (ref 96–108)
CO2 SERPL-SCNC: 29 MMOL/L — SIGNIFICANT CHANGE UP (ref 22–31)
CREAT SERPL-MCNC: 1.76 MG/DL — HIGH (ref 0.5–1.3)
EOSINOPHIL # BLD AUTO: 0.11 K/UL — SIGNIFICANT CHANGE UP (ref 0–0.5)
EOSINOPHIL NFR BLD AUTO: 1 % — SIGNIFICANT CHANGE UP (ref 0–6)
GAS PNL BLDA: SIGNIFICANT CHANGE UP
GLUCOSE BLDC GLUCOMTR-MCNC: 122 MG/DL — HIGH (ref 70–99)
GLUCOSE BLDC GLUCOMTR-MCNC: 130 MG/DL — HIGH (ref 70–99)
GLUCOSE BLDC GLUCOMTR-MCNC: 132 MG/DL — HIGH (ref 70–99)
GLUCOSE BLDC GLUCOMTR-MCNC: 95 MG/DL — SIGNIFICANT CHANGE UP (ref 70–99)
GLUCOSE SERPL-MCNC: 122 MG/DL — HIGH (ref 70–99)
HCT VFR BLD CALC: 33.2 % — LOW (ref 34.5–45)
HGB BLD-MCNC: 9.4 G/DL — LOW (ref 11.5–15.5)
IMM GRANULOCYTES NFR BLD AUTO: 0.5 % — SIGNIFICANT CHANGE UP (ref 0–1.5)
LYMPHOCYTES # BLD AUTO: 0.75 K/UL — LOW (ref 1–3.3)
LYMPHOCYTES # BLD AUTO: 6.7 % — LOW (ref 13–44)
MAGNESIUM SERPL-MCNC: 2.2 MG/DL — SIGNIFICANT CHANGE UP (ref 1.6–2.6)
MCHC RBC-ENTMCNC: 28.3 GM/DL — LOW (ref 32–36)
MCHC RBC-ENTMCNC: 29.5 PG — SIGNIFICANT CHANGE UP (ref 27–34)
MCV RBC AUTO: 104.1 FL — HIGH (ref 80–100)
MONOCYTES # BLD AUTO: 0.83 K/UL — SIGNIFICANT CHANGE UP (ref 0–0.9)
MONOCYTES NFR BLD AUTO: 7.5 % — SIGNIFICANT CHANGE UP (ref 2–14)
NEUTROPHILS # BLD AUTO: 9.33 K/UL — HIGH (ref 1.8–7.4)
NEUTROPHILS NFR BLD AUTO: 83.9 % — HIGH (ref 43–77)
NRBC # BLD: 0 /100 WBCS — SIGNIFICANT CHANGE UP (ref 0–0)
PHOSPHATE SERPL-MCNC: 3.1 MG/DL — SIGNIFICANT CHANGE UP (ref 2.5–4.5)
PLATELET # BLD AUTO: 203 K/UL — SIGNIFICANT CHANGE UP (ref 150–400)
POTASSIUM SERPL-MCNC: 5.4 MMOL/L — HIGH (ref 3.5–5.3)
POTASSIUM SERPL-SCNC: 5.4 MMOL/L — HIGH (ref 3.5–5.3)
PROT SERPL-MCNC: 6.4 G/DL — SIGNIFICANT CHANGE UP (ref 6–8.3)
RBC # BLD: 3.19 M/UL — LOW (ref 3.8–5.2)
RBC # FLD: 17.6 % — HIGH (ref 10.3–14.5)
SODIUM SERPL-SCNC: 142 MMOL/L — SIGNIFICANT CHANGE UP (ref 135–145)
WBC # BLD: 11.12 K/UL — HIGH (ref 3.8–10.5)
WBC # FLD AUTO: 11.12 K/UL — HIGH (ref 3.8–10.5)

## 2020-02-25 PROCEDURE — 99233 SBSQ HOSP IP/OBS HIGH 50: CPT | Mod: GC

## 2020-02-25 RX ORDER — INSULIN LISPRO 100/ML
VIAL (ML) SUBCUTANEOUS
Refills: 0 | Status: DISCONTINUED | OUTPATIENT
Start: 2020-02-25 | End: 2020-03-02

## 2020-02-25 RX ORDER — INSULIN LISPRO 100/ML
VIAL (ML) SUBCUTANEOUS AT BEDTIME
Refills: 0 | Status: DISCONTINUED | OUTPATIENT
Start: 2020-02-25 | End: 2020-03-02

## 2020-02-25 RX ORDER — NYSTATIN CREAM 100000 [USP'U]/G
1 CREAM TOPICAL
Refills: 0 | Status: DISCONTINUED | OUTPATIENT
Start: 2020-02-25 | End: 2020-03-02

## 2020-02-25 RX ADMIN — HEPARIN SODIUM 7500 UNIT(S): 5000 INJECTION INTRAVENOUS; SUBCUTANEOUS at 13:13

## 2020-02-25 RX ADMIN — Medication 300 MILLIGRAM(S): at 12:53

## 2020-02-25 RX ADMIN — Medication 81 MILLIGRAM(S): at 12:53

## 2020-02-25 RX ADMIN — NYSTATIN CREAM 1 APPLICATION(S): 100000 CREAM TOPICAL at 17:46

## 2020-02-25 RX ADMIN — CHLORHEXIDINE GLUCONATE 1 APPLICATION(S): 213 SOLUTION TOPICAL at 05:15

## 2020-02-25 RX ADMIN — Medication 12.5 MICROGRAM(S): at 21:20

## 2020-02-25 RX ADMIN — Medication 50 MILLIGRAM(S): at 21:23

## 2020-02-25 RX ADMIN — Medication 1 APPLICATION(S): at 05:16

## 2020-02-25 RX ADMIN — Medication 0.2 MILLIGRAM(S): at 21:20

## 2020-02-25 RX ADMIN — ATORVASTATIN CALCIUM 10 MILLIGRAM(S): 80 TABLET, FILM COATED ORAL at 21:20

## 2020-02-25 RX ADMIN — HEPARIN SODIUM 7500 UNIT(S): 5000 INJECTION INTRAVENOUS; SUBCUTANEOUS at 05:15

## 2020-02-25 RX ADMIN — Medication 20 MILLIGRAM(S): at 17:51

## 2020-02-25 RX ADMIN — HEPARIN SODIUM 7500 UNIT(S): 5000 INJECTION INTRAVENOUS; SUBCUTANEOUS at 21:20

## 2020-02-25 NOTE — AIRWAY REMOVAL NOTE  ADULT & PEDS - ARTIFICAL AIRWAY REMOVAL COMMENTS
Written order for extubation verified. The patient was identified by full name and birth date compared to the identification band. Rn was present during the procedure

## 2020-02-25 NOTE — PROGRESS NOTE ADULT - SUBJECTIVE AND OBJECTIVE BOX
CHIEF COMPLAINT: acute hypercarbic respiratory failure    OVERNIGHT / SUBJECTIVE:  -Extubated overnight, otherwise no acute events overnight  -Today, pt was seen and examined by bedside in AM.    REVIEW OF SYSTEMS:    CONSTITUTIONAL: No weakness, fevers or chills  EYES/ENT: No visual changes;  No vertigo or throat pain   NECK: No pain or stiffness  RESPIRATORY: No cough, wheezing, hemoptysis; No shortness of breath  CARDIOVASCULAR: No chest pain or palpitations  GASTROINTESTINAL: No abdominal or epigastric pain. No nausea, vomiting, or hematemesis; No diarrhea or constipation. No melena or hematochezia.  GENITOURINARY: No dysuria, frequency or hematuria  NEUROLOGICAL: No numbness or weakness  SKIN: No itching, burning, rashes, or lesions   All other review of systems is negative unless indicated above.      OBJECTIVE:  ICU Vital Signs Last 24 Hrs  T(C): 37.4 (25 Feb 2020 04:00), Max: 37.4 (25 Feb 2020 00:00)  T(F): 99.3 (25 Feb 2020 04:00), Max: 99.3 (25 Feb 2020 00:00)  HR: 73 (25 Feb 2020 06:00) (52 - 106)  BP: 134/75 (25 Feb 2020 06:00) (79/46 - 149/67)  BP(mean): 96 (25 Feb 2020 06:00) (58 - 96)  ABP: --  ABP(mean): --  RR: 20 (25 Feb 2020 06:00) (17 - 35)  SpO2: 99% (25 Feb 2020 06:00) (90% - 100%)    Mode: CPAP with PS, FiO2: 40, PEEP: 12, PS: 15, MAP: 10    02-24 @ 07:01  -  02-25 @ 07:00  --------------------------------------------------------  IN: 584.5 mL / OUT: 1748 mL / NET: -1163.5 mL      CAPILLARY BLOOD GLUCOSE      POCT Blood Glucose.: 132 mg/dL (25 Feb 2020 05:22)  POCT Blood Glucose.: 146 mg/dL (24 Feb 2020 18:14)  POCT Blood Glucose.: 93 mg/dL (24 Feb 2020 11:35)    I&O's Summary    24 Feb 2020 07:01  -  25 Feb 2020 07:00  --------------------------------------------------------  IN: 584.5 mL / OUT: 1748 mL / NET: -1163.5 mL        PHYSICAL EXAM  GENERAL: NAD, well-developed, obese woman  HEAD:  Atraumatic, Normocephalic  EYES: EOMI, PERRLA, conjunctiva and sclera clear  NECK: Supple, No JVD  CHEST/LUNG: Clear to auscultation bilaterally; No wheeze  HEART: Regular rate and rhythm; No murmurs, rubs, or gallops  ABDOMEN: Soft, Nontender, Nondistended; Bowel sounds present  EXTREMITIES:  2+ Peripheral Pulses, No clubbing, cyanosis, or edema  SKIN: No rashes or lesions    LABS:                        9.4    11.12 )-----------( 203      ( 25 Feb 2020 00:07 )             33.2     02-25    142  |  104  |  47<H>  ----------------------------<  122<H>  5.4<H>   |  29  |  1.76<H>    Ca    9.3      25 Feb 2020 00:07  Phos  3.1     02-25  Mg     2.2     02-25    TPro  6.4  /  Alb  3.1<L>  /  TBili  0.8  /  DBili  x   /  AST  20  /  ALT  19  /  AlkPhos  76  02-25        LINES:    HOSPITAL MEDICATIONS:  Standing Meds:  allopurinol 300 milliGRAM(s) Oral daily  aspirin  chewable 81 milliGRAM(s) Oral daily  atorvastatin 10 milliGRAM(s) Oral at bedtime  chlorhexidine 4% Liquid 1 Application(s) Topical <User Schedule>  cloNIDine 0.2 milliGRAM(s) Oral daily  dextrose 5%. 1000 milliLiter(s) IV Continuous <Continuous>  dextrose 50% Injectable 12.5 Gram(s) IV Push once  doxazosin 4 milliGRAM(s) Oral at bedtime  furosemide   Injectable 20 milliGRAM(s) IV Push daily  heparin  Injectable 7500 Unit(s) SubCutaneous every 8 hours  insulin lispro (HumaLOG) corrective regimen sliding scale   SubCutaneous every 6 hours  levothyroxine Injectable 12.5 MICROGram(s) IV Push at bedtime  metoprolol succinate ER 50 milliGRAM(s) Oral daily  nystatin/triamcinolone Cream 1 Application(s) Topical two times a day      PRN Meds:  dextrose 40% Gel 15 Gram(s) Oral once PRN  glucagon  Injectable 1 milliGRAM(s) IntraMuscular once PRN      LABS:                        9.4    11.12 )-----------( 203      ( 25 Feb 2020 00:07 )             33.2     Hgb Trend: 9.4<--, 10.2<--, 10.8<--  02-25    142  |  104  |  47<H>  ----------------------------<  122<H>  5.4<H>   |  29  |  1.76<H>    Ca    9.3      25 Feb 2020 00:07  Phos  3.1     02-25  Mg     2.2     02-25    TPro  6.4  /  Alb  3.1<L>  /  TBili  0.8  /  DBili  x   /  AST  20  /  ALT  19  /  AlkPhos  76  02-25    Creatinine Trend: 1.76<--, 1.79<--, 1.56<--, 1.58<--, 1.63<--  PT/INR - ( 23 Feb 2020 20:26 )   PT: 14.7 sec;   INR: 1.28 ratio         PTT - ( 23 Feb 2020 20:26 )  PTT:24.7 sec    Arterial Blood Gas:  02-24 @ 23:59  7.49/42/139/32/100/8.1  ABG lactate: --  Arterial Blood Gas:  02-24 @ 17:10  7.29/64/92/30/98/2.3  ABG lactate: --  Arterial Blood Gas:  02-24 @ 14:29  7.12/>104/112/34/98/2.1  ABG lactate: --  Arterial Blood Gas:  02-24 @ 09:58  7.22/83/133/32/99/3.4  ABG lactate: --  Arterial Blood Gas:  02-24 @ 04:03  7.21/90/98/34/97/4.5  ABG lactate: --    Venous Blood Gas:  02-23 @ 23:57  7.18/99/35/35/57  VBG Lactate: 0.9  Venous Blood Gas:  02-23 @ 21:59  7.16/103/39/35/62  VBG Lactate: 0.9  Venous Blood Gas:  02-23 @ 20:17  7.16/103/30/35/45  VBG Lactate: 0.7    PT/INR - ( 23 Feb 2020 20:26 )   PT: 14.7 sec;   INR: 1.28 ratio         PTT - ( 23 Feb 2020 20:26 )  PTT:24.7 sec          MICROBIOLOGY:     RADIOLOGY: Reviewed CHIEF COMPLAINT: acute hypercarbic respiratory failure    OVERNIGHT / SUBJECTIVE:  -Extubated overnight, otherwise no acute events overnight  -Today, pt was seen and examined by bedside in AM.    REVIEW OF SYSTEMS:    CONSTITUTIONAL: No weakness, fevers or chills  EYES/ENT: No visual changes;  No vertigo or throat pain   NECK: No pain or stiffness  RESPIRATORY: No cough, wheezing, hemoptysis; No shortness of breath  CARDIOVASCULAR: No chest pain or palpitations  GASTROINTESTINAL: No abdominal or epigastric pain. No nausea, vomiting, or hematemesis; No diarrhea or constipation. No melena or hematochezia.  GENITOURINARY: No dysuria, frequency or hematuria  NEUROLOGICAL: No numbness or weakness  SKIN: No itching, burning, rashes, or lesions   All other review of systems is negative unless indicated above.      OBJECTIVE:  ICU Vital Signs Last 24 Hrs  T(C): 37.4 (25 Feb 2020 04:00), Max: 37.4 (25 Feb 2020 00:00)  T(F): 99.3 (25 Feb 2020 04:00), Max: 99.3 (25 Feb 2020 00:00)  HR: 73 (25 Feb 2020 06:00) (52 - 106)  BP: 134/75 (25 Feb 2020 06:00) (79/46 - 149/67)  BP(mean): 96 (25 Feb 2020 06:00) (58 - 96)  ABP: --  ABP(mean): --  RR: 20 (25 Feb 2020 06:00) (17 - 35)  SpO2: 99% (25 Feb 2020 06:00) (90% - 100%)    Mode: CPAP with PS, FiO2: 40, PEEP: 12, PS: 15, MAP: 10    02-24 @ 07:01  -  02-25 @ 07:00  --------------------------------------------------------  IN: 584.5 mL / OUT: 1748 mL / NET: -1163.5 mL      CAPILLARY BLOOD GLUCOSE      POCT Blood Glucose.: 132 mg/dL (25 Feb 2020 05:22)  POCT Blood Glucose.: 146 mg/dL (24 Feb 2020 18:14)  POCT Blood Glucose.: 93 mg/dL (24 Feb 2020 11:35)    I&O's Summary    24 Feb 2020 07:01  -  25 Feb 2020 07:00  --------------------------------------------------------  IN: 584.5 mL / OUT: 1748 mL / NET: -1163.5 mL        PHYSICAL EXAM  GENERAL: NAD, well-developed, obese woman  HEAD:  Atraumatic, Normocephalic  EYES: EOMI, PERRLA, conjunctiva and sclera clear  NECK: Supple, No JVD  CHEST/LUNG: Auscultation limited due to body habitus but seems to be clear bilaterally; No wheeze  HEART: Regular rate and rhythm; No murmurs, rubs, or gallops  ABDOMEN: Soft, Nontender, Nondistended; Bowel sounds present  EXTREMITIES:  2+ Peripheral Pulses, no clubbing or cyanosis; LEs chronically edematous R > L, erythematous, warm to touch  SKIN: No rashes or lesions, feet/legs dry w/ scale and erythema    LABS:                        9.4    11.12 )-----------( 203      ( 25 Feb 2020 00:07 )             33.2     02-25    142  |  104  |  47<H>  ----------------------------<  122<H>  5.4<H>   |  29  |  1.76<H>    Ca    9.3      25 Feb 2020 00:07  Phos  3.1     02-25  Mg     2.2     02-25    TPro  6.4  /  Alb  3.1<L>  /  TBili  0.8  /  DBili  x   /  AST  20  /  ALT  19  /  AlkPhos  76  02-25        LINES:    HOSPITAL MEDICATIONS:  Standing Meds:  allopurinol 300 milliGRAM(s) Oral daily  aspirin  chewable 81 milliGRAM(s) Oral daily  atorvastatin 10 milliGRAM(s) Oral at bedtime  chlorhexidine 4% Liquid 1 Application(s) Topical <User Schedule>  cloNIDine 0.2 milliGRAM(s) Oral daily  dextrose 5%. 1000 milliLiter(s) IV Continuous <Continuous>  dextrose 50% Injectable 12.5 Gram(s) IV Push once  doxazosin 4 milliGRAM(s) Oral at bedtime  furosemide   Injectable 20 milliGRAM(s) IV Push daily  heparin  Injectable 7500 Unit(s) SubCutaneous every 8 hours  insulin lispro (HumaLOG) corrective regimen sliding scale   SubCutaneous every 6 hours  levothyroxine Injectable 12.5 MICROGram(s) IV Push at bedtime  metoprolol succinate ER 50 milliGRAM(s) Oral daily  nystatin/triamcinolone Cream 1 Application(s) Topical two times a day      PRN Meds:  dextrose 40% Gel 15 Gram(s) Oral once PRN  glucagon  Injectable 1 milliGRAM(s) IntraMuscular once PRN      LABS:                        9.4    11.12 )-----------( 203      ( 25 Feb 2020 00:07 )             33.2     Hgb Trend: 9.4<--, 10.2<--, 10.8<--  02-25    142  |  104  |  47<H>  ----------------------------<  122<H>  5.4<H>   |  29  |  1.76<H>    Ca    9.3      25 Feb 2020 00:07  Phos  3.1     02-25  Mg     2.2     02-25    TPro  6.4  /  Alb  3.1<L>  /  TBili  0.8  /  DBili  x   /  AST  20  /  ALT  19  /  AlkPhos  76  02-25    Creatinine Trend: 1.76<--, 1.79<--, 1.56<--, 1.58<--, 1.63<--  PT/INR - ( 23 Feb 2020 20:26 )   PT: 14.7 sec;   INR: 1.28 ratio         PTT - ( 23 Feb 2020 20:26 )  PTT:24.7 sec    Arterial Blood Gas:  02-24 @ 23:59  7.49/42/139/32/100/8.1  ABG lactate: --  Arterial Blood Gas:  02-24 @ 17:10  7.29/64/92/30/98/2.3  ABG lactate: --  Arterial Blood Gas:  02-24 @ 14:29  7.12/>104/112/34/98/2.1  ABG lactate: --  Arterial Blood Gas:  02-24 @ 09:58  7.22/83/133/32/99/3.4  ABG lactate: --  Arterial Blood Gas:  02-24 @ 04:03  7.21/90/98/34/97/4.5  ABG lactate: --    Venous Blood Gas:  02-23 @ 23:57  7.18/99/35/35/57  VBG Lactate: 0.9  Venous Blood Gas:  02-23 @ 21:59  7.16/103/39/35/62  VBG Lactate: 0.9  Venous Blood Gas:  02-23 @ 20:17  7.16/103/30/35/45  VBG Lactate: 0.7    PT/INR - ( 23 Feb 2020 20:26 )   PT: 14.7 sec;   INR: 1.28 ratio         PTT - ( 23 Feb 2020 20:26 )  PTT:24.7 sec          MICROBIOLOGY:     RADIOLOGY: Reviewed CHIEF COMPLAINT: acute hypercarbic respiratory failure    OVERNIGHT / SUBJECTIVE:  -Extubated overnight, otherwise no acute events overnight  -Today, pt was seen and examined by bedside in AM. No complaints except a bit of throat pain.    REVIEW OF SYSTEMS:    CONSTITUTIONAL: No weakness, fevers or chills  EYES/ENT: No visual changes;  No vertigo or throat pain   NECK: No pain or stiffness  RESPIRATORY: No cough, wheezing, hemoptysis; No shortness of breath  CARDIOVASCULAR: No chest pain or palpitations  GASTROINTESTINAL: No abdominal or epigastric pain. No nausea, vomiting, or hematemesis; No diarrhea or constipation. No melena or hematochezia.  GENITOURINARY: No dysuria, frequency or hematuria  NEUROLOGICAL: No numbness or weakness  SKIN: No itching, burning, rashes, or lesions   All other review of systems is negative unless indicated above.      OBJECTIVE:  ICU Vital Signs Last 24 Hrs  T(C): 37.4 (25 Feb 2020 04:00), Max: 37.4 (25 Feb 2020 00:00)  T(F): 99.3 (25 Feb 2020 04:00), Max: 99.3 (25 Feb 2020 00:00)  HR: 73 (25 Feb 2020 06:00) (52 - 106)  BP: 134/75 (25 Feb 2020 06:00) (79/46 - 149/67)  BP(mean): 96 (25 Feb 2020 06:00) (58 - 96)  ABP: --  ABP(mean): --  RR: 20 (25 Feb 2020 06:00) (17 - 35)  SpO2: 99% (25 Feb 2020 06:00) (90% - 100%)    Mode: CPAP with PS, FiO2: 40, PEEP: 12, PS: 15, MAP: 10    02-24 @ 07:01  -  02-25 @ 07:00  --------------------------------------------------------  IN: 584.5 mL / OUT: 1748 mL / NET: -1163.5 mL      CAPILLARY BLOOD GLUCOSE      POCT Blood Glucose.: 132 mg/dL (25 Feb 2020 05:22)  POCT Blood Glucose.: 146 mg/dL (24 Feb 2020 18:14)  POCT Blood Glucose.: 93 mg/dL (24 Feb 2020 11:35)    I&O's Summary    24 Feb 2020 07:01  -  25 Feb 2020 07:00  --------------------------------------------------------  IN: 584.5 mL / OUT: 1748 mL / NET: -1163.5 mL        PHYSICAL EXAM  GENERAL: NAD, well-developed, obese woman  HEAD:  Atraumatic, Normocephalic  EYES: EOMI, PERRLA, conjunctiva and sclera clear  NECK: Supple, No JVD  CHEST/LUNG: Auscultation limited due to body habitus but seems to be clear bilaterally; No wheeze  HEART: Regular rate and rhythm; No murmurs, rubs, or gallops  ABDOMEN: Soft, Nontender, Nondistended; Bowel sounds present  EXTREMITIES:  2+ Peripheral Pulses, no clubbing or cyanosis; LEs chronically edematous R > L, erythematous, warm to touch  SKIN: No rashes or lesions, feet/legs dry w/ scale and erythema    LABS:                        9.4    11.12 )-----------( 203      ( 25 Feb 2020 00:07 )             33.2     02-25    142  |  104  |  47<H>  ----------------------------<  122<H>  5.4<H>   |  29  |  1.76<H>    Ca    9.3      25 Feb 2020 00:07  Phos  3.1     02-25  Mg     2.2     02-25    TPro  6.4  /  Alb  3.1<L>  /  TBili  0.8  /  DBili  x   /  AST  20  /  ALT  19  /  AlkPhos  76  02-25        LINES:    HOSPITAL MEDICATIONS:  Standing Meds:  allopurinol 300 milliGRAM(s) Oral daily  aspirin  chewable 81 milliGRAM(s) Oral daily  atorvastatin 10 milliGRAM(s) Oral at bedtime  chlorhexidine 4% Liquid 1 Application(s) Topical <User Schedule>  cloNIDine 0.2 milliGRAM(s) Oral daily  dextrose 5%. 1000 milliLiter(s) IV Continuous <Continuous>  dextrose 50% Injectable 12.5 Gram(s) IV Push once  doxazosin 4 milliGRAM(s) Oral at bedtime  furosemide   Injectable 20 milliGRAM(s) IV Push daily  heparin  Injectable 7500 Unit(s) SubCutaneous every 8 hours  insulin lispro (HumaLOG) corrective regimen sliding scale   SubCutaneous every 6 hours  levothyroxine Injectable 12.5 MICROGram(s) IV Push at bedtime  metoprolol succinate ER 50 milliGRAM(s) Oral daily  nystatin/triamcinolone Cream 1 Application(s) Topical two times a day      PRN Meds:  dextrose 40% Gel 15 Gram(s) Oral once PRN  glucagon  Injectable 1 milliGRAM(s) IntraMuscular once PRN      LABS:                        9.4    11.12 )-----------( 203      ( 25 Feb 2020 00:07 )             33.2     Hgb Trend: 9.4<--, 10.2<--, 10.8<--  02-25    142  |  104  |  47<H>  ----------------------------<  122<H>  5.4<H>   |  29  |  1.76<H>    Ca    9.3      25 Feb 2020 00:07  Phos  3.1     02-25  Mg     2.2     02-25    TPro  6.4  /  Alb  3.1<L>  /  TBili  0.8  /  DBili  x   /  AST  20  /  ALT  19  /  AlkPhos  76  02-25    Creatinine Trend: 1.76<--, 1.79<--, 1.56<--, 1.58<--, 1.63<--  PT/INR - ( 23 Feb 2020 20:26 )   PT: 14.7 sec;   INR: 1.28 ratio         PTT - ( 23 Feb 2020 20:26 )  PTT:24.7 sec    Arterial Blood Gas:  02-24 @ 23:59  7.49/42/139/32/100/8.1  ABG lactate: --  Arterial Blood Gas:  02-24 @ 17:10  7.29/64/92/30/98/2.3  ABG lactate: --  Arterial Blood Gas:  02-24 @ 14:29  7.12/>104/112/34/98/2.1  ABG lactate: --  Arterial Blood Gas:  02-24 @ 09:58  7.22/83/133/32/99/3.4  ABG lactate: --  Arterial Blood Gas:  02-24 @ 04:03  7.21/90/98/34/97/4.5  ABG lactate: --    Venous Blood Gas:  02-23 @ 23:57  7.18/99/35/35/57  VBG Lactate: 0.9  Venous Blood Gas:  02-23 @ 21:59  7.16/103/39/35/62  VBG Lactate: 0.9  Venous Blood Gas:  02-23 @ 20:17  7.16/103/30/35/45  VBG Lactate: 0.7    PT/INR - ( 23 Feb 2020 20:26 )   PT: 14.7 sec;   INR: 1.28 ratio         PTT - ( 23 Feb 2020 20:26 )  PTT:24.7 sec          MICROBIOLOGY:     RADIOLOGY: Reviewed

## 2020-02-25 NOTE — PROGRESS NOTE ADULT - ASSESSMENT
75 y/o female PMHx FREDI, obesity hypoventilation syndrome, DM2, HLD, LE lymphedema, on CPAP at night recently noncompliant, presents with worsening SOB and LE edema.    # acute hypercapnic and hypoxic resp failure   sp intubation now extubation  appreciate ICU care  serial ABGs, NIV as needed  outpt pulmonologist Dr. Porter  check doppler LE ro DVT    # LANE  last creat 1.2 in 12/2019  cont to trend  strict i os    # HTN   on clonidine and toprol XL  IV lasix  TTE nml systolic fxn  CXR pulm edema    # hypothyroidism  takes synthroid 25mcg qd per [facility] EMR    # DM2   hold oral meds  SSI  a1c 6.7    PCP Dr Echavarria ([facility]) - will update    Please call [employer] with questions [phone].

## 2020-02-25 NOTE — PROGRESS NOTE ADULT - SUBJECTIVE AND OBJECTIVE BOX
Patient is a 74y old  Female who presents with a chief complaint of acute hypercarbic respiratory failure (25 Feb 2020 07:05)      SUBJECTIVE / OVERNIGHT EVENTS:    Patient seen and examined. sp intubation yesterday, now extubated to RA. states being intubated was a horrible feeling. doing better today. denies sob cough.      Vital Signs Last 24 Hrs  T(C): 37.4 (25 Feb 2020 12:00), Max: 37.4 (25 Feb 2020 00:00)  T(F): 99.3 (25 Feb 2020 12:00), Max: 99.3 (25 Feb 2020 00:00)  HR: 89 (25 Feb 2020 14:00) (52 - 106)  BP: 118/78 (25 Feb 2020 14:00) (79/46 - 149/67)  BP(mean): 90 (25 Feb 2020 14:00) (58 - 96)  RR: 29 (25 Feb 2020 14:00) (19 - 37)  SpO2: 96% (25 Feb 2020 14:00) (92% - 100%)  I&O's Summary    24 Feb 2020 07:01  -  25 Feb 2020 07:00  --------------------------------------------------------  IN: 584.5 mL / OUT: 1823 mL / NET: -1238.5 mL    25 Feb 2020 07:01  -  25 Feb 2020 14:34  --------------------------------------------------------  IN: 750 mL / OUT: 350 mL / NET: 400 mL        PE:  GENERAL: NAD, AAOx3, morbidly obese  HEAD:  Atraumatic, Normocephalic  CHEST/LUNG: decreased BS  HEART: Regular rate and rhythm; no murmur  ABDOMEN: Soft, Nontender, Nondistended; Bowel sounds present  EXTREMITIES:  2+ Peripheral Pulses, +2 le edema, lymphedema  SKIN: No rashes or lesions  NEURO: No focal deficits    LABS:                        9.4    11.12 )-----------( 203      ( 25 Feb 2020 00:07 )             33.2     02-25    142  |  104  |  47<H>  ----------------------------<  122<H>  5.4<H>   |  29  |  1.76<H>    Ca    9.3      25 Feb 2020 00:07  Phos  3.1     02-25  Mg     2.2     02-25    TPro  6.4  /  Alb  3.1<L>  /  TBili  0.8  /  DBili  x   /  AST  20  /  ALT  19  /  AlkPhos  76  02-25    PT/INR - ( 23 Feb 2020 20:26 )   PT: 14.7 sec;   INR: 1.28 ratio         PTT - ( 23 Feb 2020 20:26 )  PTT:24.7 sec  CAPILLARY BLOOD GLUCOSE      POCT Blood Glucose.: 95 mg/dL (25 Feb 2020 13:11)  POCT Blood Glucose.: 132 mg/dL (25 Feb 2020 05:22)  POCT Blood Glucose.: 146 mg/dL (24 Feb 2020 18:14)            RADIOLOGY & ADDITIONAL TESTS:    Imaging Personally Reviewed:  [x] YES  [ ] NO    Consultant(s) Notes Reviewed:  [x] YES  [ ] NO    MEDICATIONS  (STANDING):  allopurinol 300 milliGRAM(s) Oral daily  aspirin  chewable 81 milliGRAM(s) Oral daily  atorvastatin 10 milliGRAM(s) Oral at bedtime  chlorhexidine 4% Liquid 1 Application(s) Topical <User Schedule>  cloNIDine 0.2 milliGRAM(s) Oral daily  dextrose 5%. 1000 milliLiter(s) (50 mL/Hr) IV Continuous <Continuous>  dextrose 50% Injectable 12.5 Gram(s) IV Push once  doxazosin 4 milliGRAM(s) Oral at bedtime  furosemide   Injectable 20 milliGRAM(s) IV Push daily  heparin  Injectable 7500 Unit(s) SubCutaneous every 8 hours  insulin lispro (HumaLOG) corrective regimen sliding scale   SubCutaneous every 6 hours  levothyroxine Injectable 12.5 MICROGram(s) IV Push at bedtime  metoprolol succinate ER 50 milliGRAM(s) Oral daily  nystatin/triamcinolone Cream 1 Application(s) Topical two times a day    MEDICATIONS  (PRN):  dextrose 40% Gel 15 Gram(s) Oral once PRN Blood Glucose LESS THAN 70 milliGRAM(s)/deciLiter  glucagon  Injectable 1 milliGRAM(s) IntraMuscular once PRN Glucose <70 milliGRAM(s)/deciLiter      Care Discussed with Consultants/Other Providers [x] YES  [ ] NO    HEALTH ISSUES - PROBLEM Dx:

## 2020-02-25 NOTE — PROGRESS NOTE ADULT - ASSESSMENT
73 y/o female h/o tanner obesity hypoventilation syndrome, ckd3, dm2, LE lymphedema, noncompliant with CPAP presented with worsening SOB and LE edema admitted for acute on chronic hypercarbic respiratory failure.    #Neuro  lethargic upon admission  - mentating well, no deficits     #CV  HTN  - CXR pulm edema and b/l pleural effusions  - trops WNL,   - f/u TTE  - c/w home HTN meds clonidine 0.2mg. metoprolol 50mg, doxazosin 4mg   - lasix 40mg daily    #Pulm  TANNER, Obesity hypoventilation syndrome, non compliant with CPAP  Acute on chronic hypercarbic respiratory failure  - c/w BIPAP  - ABG 7.18/99/35/35  - serial ABGs  - CXR pulm edema and b/l pleural effusions    #Renal  - baseline in 2016 1.23, upon admission SCr 1.63  - LANE?  - monitor BMP    #GI  NPO while on BIPAP    #ID  afebrile, no leukocytosis    #Heme  anemia 10.8/38.4    #Endo  Hx of hypothyroidism on levothyroxine at home, unsure of dose  DM Type 2  - ISS    #PPX  - HSQ VTE ppx 73 y/o female h/o tanner obesity hypoventilation syndrome, ckd3, dm2, LE lymphedema, noncompliant with CPAP presented with worsening SOB and LE edema admitted for acute on chronic hypercarbic respiratory failure.    #Neuro  lethargic upon admission  - mentating well, no deficits     #CV  HTN  - CXR pulm edema and b/l pleural effusions  - trops WNL,   - f/u TTE  - c/w home HTN meds clonidine 0.2mg. metoprolol 50mg, doxazosin 4mg   - lasix 40mg daily    #Pulm  TANNER, Obesity hypoventilation syndrome, non compliant with CPAP  Acute on chronic hypercarbic respiratory failure  - c/w BIPAP  - ABG 7.18/99/35/35  - serial ABGs  - CXR pulm edema and b/l pleural effusions    #Renal  - baseline in 2016 1.23, upon admission SCr 1.63  - LANE?  - monitor BMP    #GI  NPO while on BIPAP    #ID  afebrile, no leukocytosis    #Heme  anemia 10.8/38.4    #Endo  Hx of hypothyroidism on levothyroxine at home, unsure of dose  DM Type 2  - HbA1c (2/24): 6.7%  - ISS    #PPX  - HSQ VTE ppx 75 y/o female h/o FREDI, obesity hypoventilation syndrome, CKD3, DM2, LE lymphedema, on CPAP at night (non-compliant), presenting with worsening SOB and LE edema, admitted for acute on chronic hypercarbic respiratory failure.    #Neuro  lethargic upon admission  - now mentating well, no deficits, AAOx3     #CV  HTN  - CXR pulm edema and b/l pleural effusions  - trops WNL,   - c/w home HTN meds clonidine 0.2mg. metoprolol 50mg, doxazosin 4mg   - c/w lasix 20mg IV daily, can transition to home 40mg PO daily tomorrow    #Pulm  FREDI, Obesity hypoventilation syndrome, non compliant with CPAP  Acute on chronic hypercarbic respiratory failure  - c/w AVAPS at night, continually reinforce use at home  - CXR pulm edema and b/l pleural effusions  - check ABGs PRN    #Renal  - baseline in 2016 1.23, upon admission SCr 1.63  - LANE?  - monitor BMP    #GI  - NPO while on BIPAP  - start on DASH/CC diet    #ID  - afebrile, no leukocytosis    #Heme  Anemia   - Hgb appears to be stable  - no signs of active bleeding    #Endo  Hx of hypothyroidism on levothyroxine at home, unsure of dose  DM Type 2  - HbA1c (2/24): 6.7%  - ISS    #DVT  - HSQ for ppx  - f/u LE Duplex b/l to r/o DVT

## 2020-02-25 NOTE — CHART NOTE - NSCHARTNOTEFT_GEN_A_CORE
MICU Transfer Note    Transfer from: MICU  Transfer to:  ( X ) Medicine    (  ) Telemetry    (  ) RCU    (  ) Palliative    (  ) Stroke Unit    (  ) _______________  Accepting physician: Dr. Tammy Lin    HPI / MICU course:  75 y/o female h/o FREDI, obesity hypoventilation syndrome, CKD3, DM2, LE lymphedema, on CPAP at night (non-compliant), presenting with worsening SOB and LE edema. Pt reportedly was acutely SOB at rest and progressively worsened. Pt found to be hypoxic in the 70s on RA when EMS arrived, and improved to 100% O2 w/ NRB. Pt endorsed lethargy. Pt denied cough or recent URI sx, denied CP, f/c/n/v/d. Pt reported that she has been noncompliant with her CPAP for the last several weeks as her  has been in the hospital for a hip replacement and she does not know how to put on her CPAP mask or work the machine. Pt found to be in acute on chronic hypercarbic respiratory failure. Admitted to MICU. Pt became more lethargic, responsive only to painful stimuli, pCO2 >104, likely 2/2 obstruction by tongue while on AVAPS. Pt intubated on 2/24 with improvement in mental status shortly afterwards, able to follow commands and answer questions appropriately, extubated less than 24 hours later on 2/25. Post-extubation pCO2 was 74 (possibly pt's baseline), pt continues to mentate well. Pt is stable and ready for transfer to the floors.      ASSESSMENT & PLAN:   75 y/o female h/o FREDI, obesity hypoventilation syndrome, CKD3, DM2, LE lymphedema, on CPAP at night (non-compliant), presenting with worsening SOB and LE edema, admitted for acute on chronic hypercarbic respiratory failure.    #Neuro  lethargic upon admission  - now mentating well, no deficits, AAOx3     #CV  HTN  - CXR pulm edema and b/l pleural effusions  - trops WNL,   - c/w home HTN meds clonidine 0.2mg. metoprolol 50mg, doxazosin 4mg   - c/w lasix 20mg IV daily, can transition to home 40mg PO daily tomorrow    #Pulm  FREDI, Obesity hypoventilation syndrome, non compliant with CPAP  Acute on chronic hypercarbic respiratory failure  - c/w AVAPS at night, continually reinforce use at home  - CXR pulm edema and b/l pleural effusions  - check ABGs PRN    #Renal  - baseline in 2016 1.23, upon admission SCr 1.63  - LANE?  - monitor BMP    #GI  - NPO while on BIPAP  - start on DASH/CC diet    #ID  - afebrile, no leukocytosis    #Heme  Anemia   - Hgb appears to be stable  - no signs of active bleeding    #Endo  Hx of hypothyroidism on levothyroxine at home, unsure of dose  DM Type 2  - HbA1c (2/24): 6.7%  - ISS    #PPX  - HSQ VTE ppx      For Follow-Up:  [ ] f/u LE Duplex b/l to r/o DVT  [ ] reinforce consistent use of AVAPS every night; can use home CPAP machine in hospital if brought in  [ ] can transition from IV Lasix to PO Lasix tomorrow  [ ] restart home meds as tolerated      ==================================================================================  Vital Signs Last 24 Hrs  T(C): 37.4 (25 Feb 2020 12:00), Max: 37.4 (25 Feb 2020 00:00)  T(F): 99.3 (25 Feb 2020 12:00), Max: 99.3 (25 Feb 2020 00:00)  HR: 81 (25 Feb 2020 15:00) (52 - 89)  BP: 111/58 (25 Feb 2020 15:00) (99/51 - 147/72)  BP(mean): 78 (25 Feb 2020 15:00) (71 - 96)  RR: 28 (25 Feb 2020 15:00) (19 - 37)  SpO2: 97% (25 Feb 2020 15:00) (92% - 100%)  I&O's Summary    24 Feb 2020 07:01  -  25 Feb 2020 07:00  --------------------------------------------------------  IN: 584.5 mL / OUT: 1823 mL / NET: -1238.5 mL    25 Feb 2020 07:01  -  25 Feb 2020 16:24  --------------------------------------------------------  IN: 750 mL / OUT: 350 mL / NET: 400 mL    PHYSICAL EXAM  GENERAL: NAD, well-developed, obese woman  HEAD:  Atraumatic, Normocephalic  EYES: EOMI, PERRLA, conjunctiva and sclera clear  NECK: Supple, No JVD  CHEST/LUNG: Auscultation limited due to body habitus but seems to be clear bilaterally; No wheeze  HEART: Regular rate and rhythm; No murmurs, rubs, or gallops  ABDOMEN: Soft, Nontender, Nondistended; Bowel sounds present  EXTREMITIES:  2+ Peripheral Pulses, no clubbing or cyanosis; LEs chronically edematous R > L, erythematous, warm to touch  SKIN: No rashes or lesions, feet/legs dry w/ scale and erythema      MEDICATIONS  (STANDING):  allopurinol 300 milliGRAM(s) Oral daily  aspirin  chewable 81 milliGRAM(s) Oral daily  atorvastatin 10 milliGRAM(s) Oral at bedtime  chlorhexidine 4% Liquid 1 Application(s) Topical <User Schedule>  cloNIDine 0.2 milliGRAM(s) Oral daily  dextrose 5%. 1000 milliLiter(s) (50 mL/Hr) IV Continuous <Continuous>  dextrose 50% Injectable 12.5 Gram(s) IV Push once  doxazosin 4 milliGRAM(s) Oral at bedtime  furosemide   Injectable 20 milliGRAM(s) IV Push daily  heparin  Injectable 7500 Unit(s) SubCutaneous every 8 hours  insulin lispro (HumaLOG) corrective regimen sliding scale   SubCutaneous every 6 hours  levothyroxine Injectable 12.5 MICROGram(s) IV Push at bedtime  metoprolol succinate ER 50 milliGRAM(s) Oral daily  nystatin/triamcinolone Cream 1 Application(s) Topical two times a day    MEDICATIONS  (PRN):  dextrose 40% Gel 15 Gram(s) Oral once PRN Blood Glucose LESS THAN 70 milliGRAM(s)/deciLiter  glucagon  Injectable 1 milliGRAM(s) IntraMuscular once PRN Glucose <70 milliGRAM(s)/deciLiter        LABS                                            9.4                   Neurophils% (auto):   83.9   (02-25 @ 00:07):    11.12)-----------(203          Lymphocytes% (auto):  6.7                                           33.2                   Eosinphils% (auto):   1.0      Manual%: Neutrophils x    ; Lymphocytes x    ; Eosinophils x    ; Bands%: x    ; Blasts x                                    142    |  104    |  47                  Calcium: 9.3   / iCa: x      (02-25 @ 00:07)    ----------------------------<  122       Magnesium: 2.2                              5.4     |  29     |  1.76             Phosphorous: 3.1      TPro  6.4    /  Alb  3.1    /  TBili  0.8    /  DBili  x      /  AST  20     /  ALT  19     /  AlkPhos  76     25 Feb 2020 00:07 MICU Transfer Note    Transfer from: MICU  Transfer to:  ( X ) Medicine    (  ) Telemetry    (  ) RCU    (  ) Palliative    (  ) Stroke Unit    (  ) _______________  Accepting physician: Dr. Tammy Lin    HPI / MICU course:  75 y/o female h/o FREDI, obesity hypoventilation syndrome, CKD3, DM2, LE lymphedema, on CPAP at night (non-compliant), presenting with worsening SOB and LE edema. Pt reportedly was acutely SOB at rest and progressively worsened. Pt found to be hypoxic in the 70s on RA when EMS arrived, and improved to 100% O2 w/ NRB. Pt endorsed lethargy. Pt denied cough or recent URI sx, denied CP, f/c/n/v/d. Pt reported that she has been noncompliant with her CPAP for the last several weeks as her  has been in the hospital for a hip replacement and she does not know how to put on her CPAP mask or work the machine. Pt found to be in acute on chronic hypercarbic respiratory failure. Admitted to MICU. Pt became more lethargic, responsive only to painful stimuli, pCO2 >104, likely 2/2 obstruction by tongue while on AVAPS. Pt intubated on 2/24 with improvement in mental status shortly afterwards, able to follow commands and answer questions appropriately, extubated less than 24 hours later on 2/25. Post-extubation pCO2 was 74 (possibly pt's baseline), pt continues to mentate well. Pt is stable and ready for transfer to the floors.      ASSESSMENT & PLAN:   75 y/o female h/o FREDI, obesity hypoventilation syndrome, CKD3, DM2, LE lymphedema, on CPAP at night (non-compliant), presenting with worsening SOB and LE edema, admitted for acute on chronic hypercarbic respiratory failure.    #Neuro  lethargic upon admission  - now mentating well, no deficits, AAOx3     #CV  HTN  - CXR pulm edema and b/l pleural effusions  - trops WNL,   - c/w home HTN meds clonidine 0.2mg. metoprolol 50mg, doxazosin 4mg   - c/w lasix 20mg IV daily, can transition to home 40mg PO daily tomorrow    #Pulm  FREDI, Obesity hypoventilation syndrome, non compliant with CPAP  Acute on chronic hypercarbic respiratory failure  - c/w AVAPS at night, continually reinforce use at home  - CXR pulm edema and b/l pleural effusions  - check ABGs PRN    #Renal  - baseline in 2016 1.23, upon admission SCr 1.63  - LANE?  - monitor BMP    #GI  - NPO while on BIPAP  - start on DASH/CC diet    #ID  - afebrile, no leukocytosis    #Heme  Anemia   - Hgb appears to be stable  - no signs of active bleeding    #Endo  Hx of hypothyroidism on levothyroxine at home, unsure of dose  DM Type 2  - HbA1c (2/24): 6.7%  - ISS    #DVT  - HSQ for ppx  - f/u LE Duplex b/l to r/o DVT      For Follow-Up:  [ ] f/u LE Duplex b/l to r/o DVT  [ ] reinforce consistent use of AVAPS every night; can use home CPAP machine in hospital if brought in  [ ] can transition from IV Lasix to PO Lasix tomorrow  [ ] restart home meds as tolerated      ==================================================================================  Vital Signs Last 24 Hrs  T(C): 37.4 (25 Feb 2020 12:00), Max: 37.4 (25 Feb 2020 00:00)  T(F): 99.3 (25 Feb 2020 12:00), Max: 99.3 (25 Feb 2020 00:00)  HR: 81 (25 Feb 2020 15:00) (52 - 89)  BP: 111/58 (25 Feb 2020 15:00) (99/51 - 147/72)  BP(mean): 78 (25 Feb 2020 15:00) (71 - 96)  RR: 28 (25 Feb 2020 15:00) (19 - 37)  SpO2: 97% (25 Feb 2020 15:00) (92% - 100%)  I&O's Summary    24 Feb 2020 07:01  -  25 Feb 2020 07:00  --------------------------------------------------------  IN: 584.5 mL / OUT: 1823 mL / NET: -1238.5 mL    25 Feb 2020 07:01  -  25 Feb 2020 16:24  --------------------------------------------------------  IN: 750 mL / OUT: 350 mL / NET: 400 mL    PHYSICAL EXAM  GENERAL: NAD, well-developed, obese woman  HEAD:  Atraumatic, Normocephalic  EYES: EOMI, PERRLA, conjunctiva and sclera clear  NECK: Supple, No JVD  CHEST/LUNG: Auscultation limited due to body habitus but seems to be clear bilaterally; No wheeze  HEART: Regular rate and rhythm; No murmurs, rubs, or gallops  ABDOMEN: Soft, Nontender, Nondistended; Bowel sounds present  EXTREMITIES:  2+ Peripheral Pulses, no clubbing or cyanosis; LEs chronically edematous R > L, erythematous, warm to touch  SKIN: No rashes or lesions, feet/legs dry w/ scale and erythema      MEDICATIONS  (STANDING):  allopurinol 300 milliGRAM(s) Oral daily  aspirin  chewable 81 milliGRAM(s) Oral daily  atorvastatin 10 milliGRAM(s) Oral at bedtime  chlorhexidine 4% Liquid 1 Application(s) Topical <User Schedule>  cloNIDine 0.2 milliGRAM(s) Oral daily  dextrose 5%. 1000 milliLiter(s) (50 mL/Hr) IV Continuous <Continuous>  dextrose 50% Injectable 12.5 Gram(s) IV Push once  doxazosin 4 milliGRAM(s) Oral at bedtime  furosemide   Injectable 20 milliGRAM(s) IV Push daily  heparin  Injectable 7500 Unit(s) SubCutaneous every 8 hours  insulin lispro (HumaLOG) corrective regimen sliding scale   SubCutaneous every 6 hours  levothyroxine Injectable 12.5 MICROGram(s) IV Push at bedtime  metoprolol succinate ER 50 milliGRAM(s) Oral daily  nystatin/triamcinolone Cream 1 Application(s) Topical two times a day    MEDICATIONS  (PRN):  dextrose 40% Gel 15 Gram(s) Oral once PRN Blood Glucose LESS THAN 70 milliGRAM(s)/deciLiter  glucagon  Injectable 1 milliGRAM(s) IntraMuscular once PRN Glucose <70 milliGRAM(s)/deciLiter        LABS                                            9.4                   Neurophils% (auto):   83.9   (02-25 @ 00:07):    11.12)-----------(203          Lymphocytes% (auto):  6.7                                           33.2                   Eosinphils% (auto):   1.0      Manual%: Neutrophils x    ; Lymphocytes x    ; Eosinophils x    ; Bands%: x    ; Blasts x                                    142    |  104    |  47                  Calcium: 9.3   / iCa: x      (02-25 @ 00:07)    ----------------------------<  122       Magnesium: 2.2                              5.4     |  29     |  1.76             Phosphorous: 3.1      TPro  6.4    /  Alb  3.1    /  TBili  0.8    /  DBili  x      /  AST  20     /  ALT  19     /  AlkPhos  76     25 Feb 2020 00:07 MICU Transfer Note    Transfer from: MICU  Transfer to:  ( X ) Medicine    (  ) Telemetry    (  ) RCU    (  ) Palliative    (  ) Stroke Unit    (  ) _______________  Accepting physician: Dr. Tammy Lin    HPI / MICU course:  73 y/o female h/o FREDI, obesity hypoventilation syndrome, CKD3, DM2, LE lymphedema, on CPAP at night (non-compliant), presenting with worsening SOB and LE edema. Pt reportedly was acutely SOB at rest and progressively worsened. Pt found to be hypoxic in the 70s on RA when EMS arrived, and improved to 100% O2 w/ NRB. Pt endorsed lethargy. Pt denied cough or recent URI sx, denied CP, f/c/n/v/d. Pt reported that she has been noncompliant with her CPAP for the last several weeks as her  has been in the hospital for a hip replacement and she does not know how to put on her CPAP mask or work the machine. Pt found to be in acute on chronic hypercarbic respiratory failure. Admitted to MICU. Pt became more lethargic, responsive only to painful stimuli, pCO2 >104, likely 2/2 obstruction by tongue while on AVAPS. Pt intubated on 2/24 with improvement in mental status shortly afterwards, able to follow commands and answer questions appropriately, extubated less than 24 hours later on 2/25. Post-extubation pCO2 was 74 (possibly pt's baseline), pt continues to mentate well. Pt is stable and ready for transfer to the floors.      ASSESSMENT & PLAN:   73 y/o female h/o FREDI, obesity hypoventilation syndrome, CKD3, DM2, LE lymphedema, on CPAP at night (non-compliant), presenting with worsening SOB and LE edema, admitted for acute on chronic hypercarbic respiratory failure.    #Neuro  lethargic upon admission  - now mentating well, no deficits, AAOx3     #CV  HTN  - CXR pulm edema and b/l pleural effusions  - trops WNL,   - c/w home HTN meds clonidine 0.2mg. metoprolol 50mg, doxazosin 4mg   - c/w lasix 20mg IV daily, can transition to home 40mg PO daily tomorrow    #Pulm  FREDI, Obesity hypoventilation syndrome, non compliant with CPAP  Acute on chronic hypercarbic respiratory failure  - c/w AVAPS at night, continually reinforce use at home  - CXR pulm edema and b/l pleural effusions  - ABGs post-extubation showing pCO2 70s, mentating well, may be baseline  - Keep O2 Sats 88-92%    #Renal  LANE  - at admission SCr 1.63 (baseline in SCr 1.23 in 2016)  - Slight uptrend in SCr  - monitor BMP    #GI  - NPO while on BIPAP  - start on DASH/CC diet    #ID  - afebrile, no leukocytosis    #Heme  Anemia   - Hgb appears to be stable  - no signs of active bleeding    #Endo  Hx of hypothyroidism on levothyroxine at home, unsure of dose  DM Type 2  - HbA1c (2/24): 6.7%  - ISS    #DVT  - HSQ for ppx  - f/u LE Duplex b/l to r/o DVT      For Follow-Up:  [ ] f/u LE Duplex b/l to r/o DVT  [ ] reinforce consistent use of AVAPS every night; can use home CPAP machine in hospital once [employer] approves it  [ ] restart home meds as tolerated  [ ] keep O2 sats 88-92%      ==================================================================================  Vital Signs Last 24 Hrs  T(C): 37.4 (25 Feb 2020 12:00), Max: 37.4 (25 Feb 2020 00:00)  T(F): 99.3 (25 Feb 2020 12:00), Max: 99.3 (25 Feb 2020 00:00)  HR: 81 (25 Feb 2020 15:00) (52 - 89)  BP: 111/58 (25 Feb 2020 15:00) (99/51 - 147/72)  BP(mean): 78 (25 Feb 2020 15:00) (71 - 96)  RR: 28 (25 Feb 2020 15:00) (19 - 37)  SpO2: 97% (25 Feb 2020 15:00) (92% - 100%)  I&O's Summary    24 Feb 2020 07:01  -  25 Feb 2020 07:00  --------------------------------------------------------  IN: 584.5 mL / OUT: 1823 mL / NET: -1238.5 mL    25 Feb 2020 07:01  -  25 Feb 2020 16:24  --------------------------------------------------------  IN: 750 mL / OUT: 350 mL / NET: 400 mL    PHYSICAL EXAM  GENERAL: NAD, well-developed, obese woman  HEAD:  Atraumatic, Normocephalic  EYES: EOMI, PERRLA, conjunctiva and sclera clear  NECK: Supple, No JVD  CHEST/LUNG: Auscultation limited due to body habitus but seems to be clear bilaterally; No wheeze  HEART: Regular rate and rhythm; No murmurs, rubs, or gallops  ABDOMEN: Soft, Nontender, Nondistended; Bowel sounds present  EXTREMITIES:  2+ Peripheral Pulses, no clubbing or cyanosis; LEs chronically edematous R > L, erythematous, warm to touch  SKIN: No rashes or lesions, feet/legs dry w/ scale and erythema      MEDICATIONS  (STANDING):  allopurinol 300 milliGRAM(s) Oral daily  aspirin  chewable 81 milliGRAM(s) Oral daily  atorvastatin 10 milliGRAM(s) Oral at bedtime  chlorhexidine 4% Liquid 1 Application(s) Topical <User Schedule>  cloNIDine 0.2 milliGRAM(s) Oral daily  dextrose 5%. 1000 milliLiter(s) (50 mL/Hr) IV Continuous <Continuous>  dextrose 50% Injectable 12.5 Gram(s) IV Push once  doxazosin 4 milliGRAM(s) Oral at bedtime  furosemide   Injectable 20 milliGRAM(s) IV Push daily  heparin  Injectable 7500 Unit(s) SubCutaneous every 8 hours  insulin lispro (HumaLOG) corrective regimen sliding scale   SubCutaneous every 6 hours  levothyroxine Injectable 12.5 MICROGram(s) IV Push at bedtime  metoprolol succinate ER 50 milliGRAM(s) Oral daily  nystatin/triamcinolone Cream 1 Application(s) Topical two times a day    MEDICATIONS  (PRN):  dextrose 40% Gel 15 Gram(s) Oral once PRN Blood Glucose LESS THAN 70 milliGRAM(s)/deciLiter  glucagon  Injectable 1 milliGRAM(s) IntraMuscular once PRN Glucose <70 milliGRAM(s)/deciLiter        LABS                                            9.4                   Neurophils% (auto):   83.9   (02-25 @ 00:07):    11.12)-----------(203          Lymphocytes% (auto):  6.7                                           33.2                   Eosinphils% (auto):   1.0      Manual%: Neutrophils x    ; Lymphocytes x    ; Eosinophils x    ; Bands%: x    ; Blasts x                                    142    |  104    |  47                  Calcium: 9.3   / iCa: x      (02-25 @ 00:07)    ----------------------------<  122       Magnesium: 2.2                              5.4     |  29     |  1.76             Phosphorous: 3.1      TPro  6.4    /  Alb  3.1    /  TBili  0.8    /  DBili  x      /  AST  20     /  ALT  19     /  AlkPhos  76     25 Feb 2020 00:07 MICU Transfer Note    Transfer from: MICU  Transfer to:  ( X ) Medicine    (  ) Telemetry    (  ) RCU    (  ) Palliative    (  ) Stroke Unit    (  ) _______________  Accepting physician: Dr. Tammy Lin    HPI / MICU course:  73 y/o female h/o FREDI, obesity hypoventilation syndrome, CKD3, DM2, LE lymphedema, on CPAP at night (non-compliant), presenting with worsening SOB and LE edema. Pt reportedly was acutely SOB at rest and progressively worsened. Pt found to be hypoxic in the 70s on RA when EMS arrived, and improved to 100% O2 w/ NRB. Pt endorsed lethargy. Pt denied cough or recent URI sx, denied CP, f/c/n/v/d. Pt reported that she has been noncompliant with her CPAP for the last several weeks as her  has been in the hospital for a hip replacement and she does not know how to put on her CPAP mask or work the machine. Pt found to be in acute on chronic hypercarbic respiratory failure. Admitted to MICU. Pt became more lethargic, responsive only to painful stimuli, pCO2 >104, likely 2/2 obstruction by tongue while on AVAPS. Pt intubated on 2/24 with improvement in mental status shortly afterwards, able to follow commands and answer questions appropriately, extubated less than 24 hours later on 2/25. Post-extubation pCO2 was 74 (possibly pt's baseline), pt continues to mentate well. Pt is stable and ready for transfer to the floors.      ASSESSMENT & PLAN:   73 y/o female h/o FREDI, obesity hypoventilation syndrome, CKD3, DM2, LE lymphedema, on CPAP at night (non-compliant), presenting with worsening SOB and LE edema, admitted for acute on chronic hypercarbic respiratory failure.    #Neuro  lethargic upon admission  - now mentating well, no deficits, AAOx3     #CV  HTN  - CXR pulm edema and b/l pleural effusions  - trops WNL,   - c/w home HTN meds clonidine 0.2mg. metoprolol 50mg, doxazosin 4mg   - c/w lasix 20mg IV daily, can transition to home 40mg PO daily tomorrow    #Pulm  FREDI, Obesity hypoventilation syndrome, non compliant with CPAP  Acute on chronic hypercarbic respiratory failure  - c/w AVAPS at night, continually reinforce use at home  - CXR pulm edema and b/l pleural effusions  - ABGs post-extubation showing pCO2 70s, mentating well, may be baseline  - Keep O2 Sats 88-92%    #Renal  LANE  - at admission SCr 1.63 (baseline in SCr 1.23 in 2016)  - Slight uptrend in SCr  - monitor BMP    #GI  - NPO while on BIPAP  - start on DASH/CC diet    #ID  - afebrile, no leukocytosis    #Heme  Anemia   - Hgb appears to be stable  - no signs of active bleeding    #Endo  Hx of hypothyroidism on levothyroxine at home, unsure of dose  DM Type 2  - HbA1c (2/24): 6.7%  - ISS    #DVT  - HSQ for ppx  - f/u LE Duplex b/l to r/o DVT    #ETHICS  - DNR/DNI (MOLST in chart)    For Follow-Up:  [ ] f/u LE Duplex b/l to r/o DVT  [ ] reinforce consistent use of AVAPS every night; can use home CPAP machine in hospital once [employer] approves it  [ ] restart home meds as tolerated  [ ] keep O2 sats 88-92%      ==================================================================================  Vital Signs Last 24 Hrs  T(C): 37.4 (25 Feb 2020 12:00), Max: 37.4 (25 Feb 2020 00:00)  T(F): 99.3 (25 Feb 2020 12:00), Max: 99.3 (25 Feb 2020 00:00)  HR: 81 (25 Feb 2020 15:00) (52 - 89)  BP: 111/58 (25 Feb 2020 15:00) (99/51 - 147/72)  BP(mean): 78 (25 Feb 2020 15:00) (71 - 96)  RR: 28 (25 Feb 2020 15:00) (19 - 37)  SpO2: 97% (25 Feb 2020 15:00) (92% - 100%)  I&O's Summary    24 Feb 2020 07:01  -  25 Feb 2020 07:00  --------------------------------------------------------  IN: 584.5 mL / OUT: 1823 mL / NET: -1238.5 mL    25 Feb 2020 07:01  -  25 Feb 2020 16:24  --------------------------------------------------------  IN: 750 mL / OUT: 350 mL / NET: 400 mL    PHYSICAL EXAM  GENERAL: NAD, well-developed, obese woman  HEAD:  Atraumatic, Normocephalic  EYES: EOMI, PERRLA, conjunctiva and sclera clear  NECK: Supple, No JVD  CHEST/LUNG: Auscultation limited due to body habitus but seems to be clear bilaterally; No wheeze  HEART: Regular rate and rhythm; No murmurs, rubs, or gallops  ABDOMEN: Soft, Nontender, Nondistended; Bowel sounds present  EXTREMITIES:  2+ Peripheral Pulses, no clubbing or cyanosis; LEs chronically edematous R > L, erythematous, warm to touch  SKIN: No rashes or lesions, feet/legs dry w/ scale and erythema      MEDICATIONS  (STANDING):  allopurinol 300 milliGRAM(s) Oral daily  aspirin  chewable 81 milliGRAM(s) Oral daily  atorvastatin 10 milliGRAM(s) Oral at bedtime  chlorhexidine 4% Liquid 1 Application(s) Topical <User Schedule>  cloNIDine 0.2 milliGRAM(s) Oral daily  dextrose 5%. 1000 milliLiter(s) (50 mL/Hr) IV Continuous <Continuous>  dextrose 50% Injectable 12.5 Gram(s) IV Push once  doxazosin 4 milliGRAM(s) Oral at bedtime  furosemide   Injectable 20 milliGRAM(s) IV Push daily  heparin  Injectable 7500 Unit(s) SubCutaneous every 8 hours  insulin lispro (HumaLOG) corrective regimen sliding scale   SubCutaneous every 6 hours  levothyroxine Injectable 12.5 MICROGram(s) IV Push at bedtime  metoprolol succinate ER 50 milliGRAM(s) Oral daily  nystatin/triamcinolone Cream 1 Application(s) Topical two times a day    MEDICATIONS  (PRN):  dextrose 40% Gel 15 Gram(s) Oral once PRN Blood Glucose LESS THAN 70 milliGRAM(s)/deciLiter  glucagon  Injectable 1 milliGRAM(s) IntraMuscular once PRN Glucose <70 milliGRAM(s)/deciLiter        LABS                                            9.4                   Neurophils% (auto):   83.9   (02-25 @ 00:07):    11.12)-----------(203          Lymphocytes% (auto):  6.7                                           33.2                   Eosinphils% (auto):   1.0      Manual%: Neutrophils x    ; Lymphocytes x    ; Eosinophils x    ; Bands%: x    ; Blasts x                                    142    |  104    |  47                  Calcium: 9.3   / iCa: x      (02-25 @ 00:07)    ----------------------------<  122       Magnesium: 2.2                              5.4     |  29     |  1.76             Phosphorous: 3.1      TPro  6.4    /  Alb  3.1    /  TBili  0.8    /  DBili  x      /  AST  20     /  ALT  19     /  [employer]  76     25 Feb 2020 00:07 MICU Transfer Note    Transfer from: MICU  Transfer to:  ( X ) Medicine    (  ) Telemetry    (  ) RCU    (  ) Palliative    (  ) Stroke Unit    (  ) _______________  Accepting physician: Dr. Tammy Lin    HPI / MICU course:  75 y/o female h/o FREDI, obesity hypoventilation syndrome, CKD3, DM2, LE lymphedema, on CPAP at night (non-compliant), presenting with worsening SOB and LE edema. Pt reportedly was acutely SOB at rest and progressively worsened. Pt found to be hypoxic in the 70s on RA when EMS arrived, and improved to 100% O2 w/ NRB. Pt endorsed lethargy. Pt denied cough or recent URI sx, denied CP, f/c/n/v/d. Pt reported that she has been noncompliant with her CPAP for the last several weeks as her  has been in the hospital for a hip replacement and she does not know how to put on her CPAP mask or work the machine. Pt found to be in acute on chronic hypercarbic respiratory failure. Admitted to MICU. Pt became more lethargic, responsive only to painful stimuli, pCO2 >104, likely 2/2 obstruction by tongue while on AVAPS. Pt intubated on 2/24 with improvement in mental status shortly afterwards, able to follow commands and answer questions appropriately, extubated less than 24 hours later on 2/25. Post-extubation pCO2 was 74 (possibly pt's baseline), pt continues to mentate well. Pt is stable and ready for transfer to the floors.      ASSESSMENT & PLAN:   73 yo F w/ hx of FREDI, obesity hypoventilation syndrome, CKD3, DM2, LE lymphedema, on CPAP at night (non-compliant), presenting with worsening SOB and LE edema, admitted for acute on chronic hypercarbic respiratory failure.    #Neuro  lethargic upon admission  - now mentating well, no deficits, AAOx3     #CV  HTN  - c/w home HTN meds clonidine 0.2mg. metoprolol 50mg, doxazosin 4mg   - transition to home Lasix 20mg PO daily    #Pulm  FREDI, Obesity hypoventilation syndrome, non compliant with CPAP  Acute on chronic hypercarbic respiratory failure  - At admission, CXR pulm edema and b/l pleural effusions  - c/w AVAPS at night, continually reinforce use at home. Can use home CPAP machine while in hospital (once approved by [employer])  - ABGs post-extubation showing pCO2 70s, mentating well, may be baseline  - Keep O2 sats 88-92%    #Renal  LANE  - at admission SCr 1.63 (baseline in SCr 1.23 in 2016)  - Slight uptrend in SCr to 2.0  - monitor BMP    #GI  - NPO while on BIPAP  - DASH/CC diet    #ID  - afebrile, no leukocytosis    #Heme  Anemia   - Hgb appears to be stable  - no signs of active bleeding    #Endo  Hx of hypothyroidism  - c/w home levothyroxine 25mcg daily    DM Type 2  - HbA1c (2/24): 6.7%  - ISS    #DVT  - HSQ for ppx  - f/u LE Duplex b/l to r/o DVT    #ETHICS  - DNR/DNI (MOLST in chart)    For Follow-Up:  [ ] f/u LE Duplex b/l to r/o DVT  [ ] reinforce consistent use of AVAPS every night; can use home CPAP machine in hospital  [ ] restart home meds as tolerated  [ ] keep O2 sats 88-92%      ==================================================================================  Vital Signs Last 24 Hrs  T(C): 37.4 (25 Feb 2020 12:00), Max: 37.4 (25 Feb 2020 00:00)  T(F): 99.3 (25 Feb 2020 12:00), Max: 99.3 (25 Feb 2020 00:00)  HR: 81 (25 Feb 2020 15:00) (52 - 89)  BP: 111/58 (25 Feb 2020 15:00) (99/51 - 147/72)  BP(mean): 78 (25 Feb 2020 15:00) (71 - 96)  RR: 28 (25 Feb 2020 15:00) (19 - 37)  SpO2: 97% (25 Feb 2020 15:00) (92% - 100%)  I&O's Summary    24 Feb 2020 07:01  -  25 Feb 2020 07:00  --------------------------------------------------------  IN: 584.5 mL / OUT: 1823 mL / NET: -1238.5 mL    25 Feb 2020 07:01  -  25 Feb 2020 16:24  --------------------------------------------------------  IN: 750 mL / OUT: 350 mL / NET: 400 mL    PHYSICAL EXAM  GENERAL: NAD, well-developed, obese woman  HEAD:  Atraumatic, Normocephalic  EYES: EOMI, PERRLA, conjunctiva and sclera clear  NECK: Supple, No JVD  CHEST/LUNG: Auscultation limited due to body habitus but seems to be clear bilaterally; No wheeze  HEART: Regular rate and rhythm; No murmurs, rubs, or gallops  ABDOMEN: Soft, Nontender, Nondistended; Bowel sounds present  EXTREMITIES:  2+ Peripheral Pulses, no clubbing or cyanosis; LEs chronically edematous R > L, erythematous, warm to touch  SKIN: No rashes or lesions, feet/legs dry w/ scale and erythema      MEDICATIONS  (STANDING):  allopurinol 300 milliGRAM(s) Oral daily  aspirin  chewable 81 milliGRAM(s) Oral daily  atorvastatin 10 milliGRAM(s) Oral at bedtime  chlorhexidine 4% Liquid 1 Application(s) Topical <User Schedule>  cloNIDine 0.2 milliGRAM(s) Oral daily  dextrose 5%. 1000 milliLiter(s) (50 mL/Hr) IV Continuous <Continuous>  dextrose 50% Injectable 12.5 Gram(s) IV Push once  doxazosin 4 milliGRAM(s) Oral at bedtime  furosemide   Injectable 20 milliGRAM(s) IV Push daily  heparin  Injectable 7500 Unit(s) SubCutaneous every 8 hours  insulin lispro (HumaLOG) corrective regimen sliding scale   SubCutaneous every 6 hours  levothyroxine Injectable 12.5 MICROGram(s) IV Push at bedtime  metoprolol succinate ER 50 milliGRAM(s) Oral daily  nystatin/triamcinolone Cream 1 Application(s) Topical two times a day    MEDICATIONS  (PRN):  dextrose 40% Gel 15 Gram(s) Oral once PRN Blood Glucose LESS THAN 70 milliGRAM(s)/deciLiter  glucagon  Injectable 1 milliGRAM(s) IntraMuscular once PRN Glucose <70 milliGRAM(s)/deciLiter        LABS                                            9.4                   Neurophils% (auto):   83.9   (02-25 @ 00:07):    11.12)-----------(203          Lymphocytes% (auto):  6.7                                           33.2                   Eosinphils% (auto):   1.0      Manual%: Neutrophils x    ; Lymphocytes x    ; Eosinophils x    ; Bands%: x    ; Blasts x                                    142    |  104    |  47                  Calcium: 9.3   / iCa: x      (02-25 @ 00:07)    ----------------------------<  122       Magnesium: 2.2                              5.4     |  29     |  1.76             Phosphorous: 3.1      TPro  6.4    /  Alb  3.1    /  TBili  0.8    /  DBili  x      /  AST  20     /  ALT  19     /  AlkPhos  76     25 Feb 2020 00:07 MICU Transfer Note    Transfer from: MICU  Transfer to:  ( X ) Medicine    (  ) Telemetry    (  ) RCU    (  ) Palliative    (  ) Stroke Unit    (  ) _______________  Accepting physician: Dr. Tammy Lin    HPI / MICU course:  73 y/o female h/o FREDI, obesity hypoventilation syndrome, CKD3, DM2, LE lymphedema, on CPAP at night (non-compliant), presenting with worsening SOB and LE edema. Pt reportedly was acutely SOB at rest and progressively worsened. Pt found to be hypoxic in the 70s on RA when EMS arrived, and improved to 100% O2 w/ NRB. Pt endorsed lethargy. Pt denied cough or recent URI sx, denied CP, f/c/n/v/d. Pt reported that she has been noncompliant with her CPAP for the last several weeks as her  has been in the hospital for a hip replacement and she does not know how to put on her CPAP mask or work the machine. Pt found to be in acute on chronic hypercarbic respiratory failure. Admitted to MICU. Pt became more lethargic, responsive only to painful stimuli, pCO2 >104, likely 2/2 obstruction by tongue while on AVAPS. Pt intubated on 2/24 with improvement in mental status shortly afterwards, able to follow commands and answer questions appropriately, extubated less than 24 hours later on 2/25. Post-extubation pCO2 was 74 (possibly pt's baseline), pt continues to mentate well. Pt is stable and ready for transfer to the floors.      ASSESSMENT & PLAN:   75 yo F w/ hx of FREDI, obesity hypoventilation syndrome, CKD3, DM2, LE lymphedema, on CPAP at night (non-compliant), presenting with worsening SOB and LE edema, admitted for acute on chronic hypercarbic respiratory failure.    #Neuro  lethargic upon admission  - now mentating well, no deficits, AAOx3     #CV  HTN  - c/w home HTN meds clonidine 0.2mg. metoprolol 50mg, doxazosin 4mg   - c/w home Lasix 20mg PO daily    #Pulm  FREDI, Obesity hypoventilation syndrome, non compliant with CPAP  Acute on chronic hypercarbic respiratory failure  - At admission, CXR pulm edema and b/l pleural effusions  - c/w AVAPS at night, continually reinforce use at home. Can use home CPAP machine while in hospital (once approved by [employer])  - ABGs post-extubation showing pCO2 70s, mentating well, may be baseline  - Keep O2 sats 88-92%    #Renal  LANE  - at admission SCr 1.63 (baseline in SCr 1.23 in 2016)  - Slight uptrend in SCr to 2.0  - monitor BMP    #GI  - NPO while on BIPAP  - DASH/CC diet    #ID  - afebrile, no leukocytosis    #Heme  Anemia   - Hgb appears to be stable  - no signs of active bleeding    #Endo  Hx of hypothyroidism  - c/w home levothyroxine 25mcg daily    DM Type 2  - HbA1c (2/24): 6.7%  - ISS    #DVT  - HSQ for ppx  - LE Duplex b/l (2/26): negative for DVT    #ETHICS  - DNR/DNI (MOLST in chart)    For Follow-Up:  [ ] reinforce consistent use of AVAPS every night; can use home CPAP machine in hospital  [ ] restart home meds as tolerated  [ ] keep O2 sats 88-92%      ==================================================================================  Vital Signs Last 24 Hrs  T(C): 37.4 (25 Feb 2020 12:00), Max: 37.4 (25 Feb 2020 00:00)  T(F): 99.3 (25 Feb 2020 12:00), Max: 99.3 (25 Feb 2020 00:00)  HR: 81 (25 Feb 2020 15:00) (52 - 89)  BP: 111/58 (25 Feb 2020 15:00) (99/51 - 147/72)  BP(mean): 78 (25 Feb 2020 15:00) (71 - 96)  RR: 28 (25 Feb 2020 15:00) (19 - 37)  SpO2: 97% (25 Feb 2020 15:00) (92% - 100%)  I&O's Summary    24 Feb 2020 07:01  -  25 Feb 2020 07:00  --------------------------------------------------------  IN: 584.5 mL / OUT: 1823 mL / NET: -1238.5 mL    25 Feb 2020 07:01  -  25 Feb 2020 16:24  --------------------------------------------------------  IN: 750 mL / OUT: 350 mL / NET: 400 mL    PHYSICAL EXAM  GENERAL: NAD, well-developed, obese woman  HEAD:  Atraumatic, Normocephalic  EYES: EOMI, PERRLA, conjunctiva and sclera clear  NECK: Supple, No JVD  CHEST/LUNG: Auscultation limited due to body habitus but seems to be clear bilaterally; No wheeze  HEART: Regular rate and rhythm; No murmurs, rubs, or gallops  ABDOMEN: Soft, Nontender, Nondistended; Bowel sounds present  EXTREMITIES:  2+ Peripheral Pulses, no clubbing or cyanosis; LEs chronically edematous R > L, erythematous, warm to touch  SKIN: No rashes or lesions, feet/legs dry w/ scale and erythema      MEDICATIONS  (STANDING):  allopurinol 300 milliGRAM(s) Oral daily  aspirin  chewable 81 milliGRAM(s) Oral daily  atorvastatin 10 milliGRAM(s) Oral at bedtime  chlorhexidine 4% Liquid 1 Application(s) Topical <User Schedule>  cloNIDine 0.2 milliGRAM(s) Oral daily  dextrose 5%. 1000 milliLiter(s) (50 mL/Hr) IV Continuous <Continuous>  dextrose 50% Injectable 12.5 Gram(s) IV Push once  doxazosin 4 milliGRAM(s) Oral at bedtime  furosemide   Injectable 20 milliGRAM(s) IV Push daily  heparin  Injectable 7500 Unit(s) SubCutaneous every 8 hours  insulin lispro (HumaLOG) corrective regimen sliding scale   SubCutaneous every 6 hours  levothyroxine Injectable 12.5 MICROGram(s) IV Push at bedtime  metoprolol succinate ER 50 milliGRAM(s) Oral daily  nystatin/triamcinolone Cream 1 Application(s) Topical two times a day    MEDICATIONS  (PRN):  dextrose 40% Gel 15 Gram(s) Oral once PRN Blood Glucose LESS THAN 70 milliGRAM(s)/deciLiter  glucagon  Injectable 1 milliGRAM(s) IntraMuscular once PRN Glucose <70 milliGRAM(s)/deciLiter        LABS                                            9.4                   Neurophils% (auto):   83.9   (02-25 @ 00:07):    11.12)-----------(203          Lymphocytes% (auto):  6.7                                           33.2                   Eosinphils% (auto):   1.0      Manual%: Neutrophils x    ; Lymphocytes x    ; Eosinophils x    ; Bands%: x    ; Blasts x                                    142    |  104    |  47                  Calcium: 9.3   / iCa: x      (02-25 @ 00:07)    ----------------------------<  122       Magnesium: 2.2                              5.4     |  29     |  1.76             Phosphorous: 3.1      TPro  6.4    /  Alb  3.1    /  TBili  0.8    /  DBili  x      /  AST  20     /  ALT  19     /  AlkPhos  76     25 Feb 2020 00:07

## 2020-02-26 LAB
ALBUMIN SERPL ELPH-MCNC: 3.1 G/DL — LOW (ref 3.3–5)
ALP SERPL-CCNC: 78 U/L — SIGNIFICANT CHANGE UP (ref 40–120)
ALT FLD-CCNC: 19 U/L — SIGNIFICANT CHANGE UP (ref 10–45)
ANION GAP SERPL CALC-SCNC: 11 MMOL/L — SIGNIFICANT CHANGE UP (ref 5–17)
AST SERPL-CCNC: 15 U/L — SIGNIFICANT CHANGE UP (ref 10–40)
BASE EXCESS BLDA CALC-SCNC: 8 MMOL/L — HIGH (ref -2–2)
BASOPHILS # BLD AUTO: 0.03 K/UL — SIGNIFICANT CHANGE UP (ref 0–0.2)
BASOPHILS NFR BLD AUTO: 0.3 % — SIGNIFICANT CHANGE UP (ref 0–2)
BILIRUB SERPL-MCNC: 0.5 MG/DL — SIGNIFICANT CHANGE UP (ref 0.2–1.2)
BUN SERPL-MCNC: 55 MG/DL — HIGH (ref 7–23)
CALCIUM SERPL-MCNC: 8.9 MG/DL — SIGNIFICANT CHANGE UP (ref 8.4–10.5)
CHLORIDE SERPL-SCNC: 103 MMOL/L — SIGNIFICANT CHANGE UP (ref 96–108)
CO2 BLDA-SCNC: 38 MMOL/L — HIGH (ref 22–30)
CO2 SERPL-SCNC: 31 MMOL/L — SIGNIFICANT CHANGE UP (ref 22–31)
CREAT SERPL-MCNC: 2.03 MG/DL — HIGH (ref 0.5–1.3)
EOSINOPHIL # BLD AUTO: 0.41 K/UL — SIGNIFICANT CHANGE UP (ref 0–0.5)
EOSINOPHIL NFR BLD AUTO: 4.4 % — SIGNIFICANT CHANGE UP (ref 0–6)
GAS PNL BLDA: SIGNIFICANT CHANGE UP
GLUCOSE BLDC GLUCOMTR-MCNC: 152 MG/DL — HIGH (ref 70–99)
GLUCOSE BLDC GLUCOMTR-MCNC: 160 MG/DL — HIGH (ref 70–99)
GLUCOSE BLDC GLUCOMTR-MCNC: 170 MG/DL — HIGH (ref 70–99)
GLUCOSE BLDC GLUCOMTR-MCNC: 80 MG/DL — SIGNIFICANT CHANGE UP (ref 70–99)
GLUCOSE SERPL-MCNC: 99 MG/DL — SIGNIFICANT CHANGE UP (ref 70–99)
HCO3 BLDA-SCNC: 36 MMOL/L — HIGH (ref 21–29)
HCT VFR BLD CALC: 32.1 % — LOW (ref 34.5–45)
HGB BLD-MCNC: 9.3 G/DL — LOW (ref 11.5–15.5)
HOROWITZ INDEX BLDA+IHG-RTO: 40 — SIGNIFICANT CHANGE UP
IMM GRANULOCYTES NFR BLD AUTO: 0.4 % — SIGNIFICANT CHANGE UP (ref 0–1.5)
INTUBATED: SIGNIFICANT CHANGE UP
LYMPHOCYTES # BLD AUTO: 0.95 K/UL — LOW (ref 1–3.3)
LYMPHOCYTES # BLD AUTO: 10.2 % — LOW (ref 13–44)
MAGNESIUM SERPL-MCNC: 2.3 MG/DL — SIGNIFICANT CHANGE UP (ref 1.6–2.6)
MCHC RBC-ENTMCNC: 29 GM/DL — LOW (ref 32–36)
MCHC RBC-ENTMCNC: 30.5 PG — SIGNIFICANT CHANGE UP (ref 27–34)
MCV RBC AUTO: 105.2 FL — HIGH (ref 80–100)
MONOCYTES # BLD AUTO: 0.77 K/UL — SIGNIFICANT CHANGE UP (ref 0–0.9)
MONOCYTES NFR BLD AUTO: 8.3 % — SIGNIFICANT CHANGE UP (ref 2–14)
NEUTROPHILS # BLD AUTO: 7.07 K/UL — SIGNIFICANT CHANGE UP (ref 1.8–7.4)
NEUTROPHILS NFR BLD AUTO: 76.4 % — SIGNIFICANT CHANGE UP (ref 43–77)
NRBC # BLD: 0 /100 WBCS — SIGNIFICANT CHANGE UP (ref 0–0)
PCO2 BLDA: 73 MMHG — CRITICAL HIGH (ref 32–46)
PH BLDA: 7.31 — LOW (ref 7.35–7.45)
PHOSPHATE SERPL-MCNC: 4.9 MG/DL — HIGH (ref 2.5–4.5)
PLATELET # BLD AUTO: 169 K/UL — SIGNIFICANT CHANGE UP (ref 150–400)
PO2 BLDA: 149 MMHG — HIGH (ref 74–108)
POTASSIUM SERPL-MCNC: 5.3 MMOL/L — SIGNIFICANT CHANGE UP (ref 3.5–5.3)
POTASSIUM SERPL-SCNC: 5.3 MMOL/L — SIGNIFICANT CHANGE UP (ref 3.5–5.3)
PROT SERPL-MCNC: 6.5 G/DL — SIGNIFICANT CHANGE UP (ref 6–8.3)
RBC # BLD: 3.05 M/UL — LOW (ref 3.8–5.2)
RBC # FLD: 17.9 % — HIGH (ref 10.3–14.5)
SAO2 % BLDA: 99 % — HIGH (ref 92–96)
SODIUM SERPL-SCNC: 145 MMOL/L — SIGNIFICANT CHANGE UP (ref 135–145)
WBC # BLD: 9.27 K/UL — SIGNIFICANT CHANGE UP (ref 3.8–10.5)
WBC # FLD AUTO: 9.27 K/UL — SIGNIFICANT CHANGE UP (ref 3.8–10.5)

## 2020-02-26 PROCEDURE — 93970 EXTREMITY STUDY: CPT | Mod: 26

## 2020-02-26 PROCEDURE — 99233 SBSQ HOSP IP/OBS HIGH 50: CPT | Mod: GC

## 2020-02-26 RX ORDER — BIMATOPROST 0.3 MG/ML
1 SOLUTION/ DROPS OPHTHALMIC AT BEDTIME
Refills: 0 | Status: DISCONTINUED | OUTPATIENT
Start: 2020-02-26 | End: 2020-03-02

## 2020-02-26 RX ORDER — ALLOPURINOL 300 MG
1 TABLET ORAL
Qty: 0 | Refills: 0 | DISCHARGE

## 2020-02-26 RX ORDER — FUROSEMIDE 40 MG
20 TABLET ORAL DAILY
Refills: 0 | Status: DISCONTINUED | OUTPATIENT
Start: 2020-02-26 | End: 2020-03-02

## 2020-02-26 RX ORDER — METFORMIN HYDROCHLORIDE 850 MG/1
1 TABLET ORAL
Qty: 0 | Refills: 0 | DISCHARGE

## 2020-02-26 RX ORDER — LEVOTHYROXINE SODIUM 125 MCG
25 TABLET ORAL DAILY
Refills: 0 | Status: DISCONTINUED | OUTPATIENT
Start: 2020-02-26 | End: 2020-03-02

## 2020-02-26 RX ORDER — BIMATOPROST 0.3 MG/ML
1 SOLUTION/ DROPS OPHTHALMIC
Qty: 0 | Refills: 0 | DISCHARGE

## 2020-02-26 RX ORDER — DOXAZOSIN MESYLATE 4 MG
4 TABLET ORAL DAILY
Refills: 0 | Status: DISCONTINUED | OUTPATIENT
Start: 2020-02-26 | End: 2020-03-02

## 2020-02-26 RX ORDER — FUROSEMIDE 40 MG
40 TABLET ORAL DAILY
Refills: 0 | Status: DISCONTINUED | OUTPATIENT
Start: 2020-02-26 | End: 2020-02-26

## 2020-02-26 RX ORDER — OMEGA-3 ACID ETHYL ESTERS 1 G
1 CAPSULE ORAL
Qty: 0 | Refills: 0 | DISCHARGE

## 2020-02-26 RX ORDER — LEVOTHYROXINE SODIUM 125 MCG
1 TABLET ORAL
Qty: 0 | Refills: 0 | DISCHARGE

## 2020-02-26 RX ORDER — LATANOPROST 0.05 MG/ML
1 SOLUTION/ DROPS OPHTHALMIC; TOPICAL AT BEDTIME
Refills: 0 | Status: DISCONTINUED | OUTPATIENT
Start: 2020-02-26 | End: 2020-02-26

## 2020-02-26 RX ORDER — BIMATOPROST 0.3 MG/ML
1 SOLUTION/ DROPS OPHTHALMIC AT BEDTIME
Refills: 0 | Status: DISCONTINUED | OUTPATIENT
Start: 2020-02-26 | End: 2020-02-26

## 2020-02-26 RX ADMIN — HEPARIN SODIUM 7500 UNIT(S): 5000 INJECTION INTRAVENOUS; SUBCUTANEOUS at 21:29

## 2020-02-26 RX ADMIN — Medication 1: at 17:34

## 2020-02-26 RX ADMIN — Medication 81 MILLIGRAM(S): at 11:34

## 2020-02-26 RX ADMIN — HEPARIN SODIUM 7500 UNIT(S): 5000 INJECTION INTRAVENOUS; SUBCUTANEOUS at 05:12

## 2020-02-26 RX ADMIN — Medication 50 MILLIGRAM(S): at 21:29

## 2020-02-26 RX ADMIN — Medication 0.2 MILLIGRAM(S): at 21:29

## 2020-02-26 RX ADMIN — CHLORHEXIDINE GLUCONATE 1 APPLICATION(S): 213 SOLUTION TOPICAL at 05:12

## 2020-02-26 RX ADMIN — NYSTATIN CREAM 1 APPLICATION(S): 100000 CREAM TOPICAL at 17:34

## 2020-02-26 RX ADMIN — Medication 1: at 12:52

## 2020-02-26 RX ADMIN — ATORVASTATIN CALCIUM 10 MILLIGRAM(S): 80 TABLET, FILM COATED ORAL at 21:29

## 2020-02-26 RX ADMIN — Medication 20 MILLIGRAM(S): at 18:25

## 2020-02-26 RX ADMIN — Medication 4 MILLIGRAM(S): at 05:11

## 2020-02-26 RX ADMIN — HEPARIN SODIUM 7500 UNIT(S): 5000 INJECTION INTRAVENOUS; SUBCUTANEOUS at 13:05

## 2020-02-26 RX ADMIN — BIMATOPROST 1 DROP(S): 0.3 SOLUTION/ DROPS OPHTHALMIC at 21:24

## 2020-02-26 RX ADMIN — NYSTATIN CREAM 1 APPLICATION(S): 100000 CREAM TOPICAL at 05:11

## 2020-02-26 RX ADMIN — Medication 300 MILLIGRAM(S): at 11:34

## 2020-02-26 NOTE — DIETITIAN INITIAL EVALUATION ADULT. - OTHER INFO
Pt seen for: BMI > 40   Adm dx: respiratory failure    GI issues: denies N/V   Last BM: 2/23    Food allergies/Intolerances: NKFA   Vit/supplement PTA: vitamin C, B12, Ca+D, multivitamin, Fe    Diet PTA: tries to watch carbohydrate intake     Subjective/Objective information: pt reports good appetite PTA and since diet advanced. States that she checks BG once a day, usual range 140-180, states last A1c done in Jan was 6.7, a1c this adm 6.7  Reports that she has tried to lose wt her whole life, has done [employer]ystem many times in past w/ limited success. Reports no wt changes over past year, has chronic LE lymphedema.    Education: pt declined written diet info for DM or weight loss, stated that she is very aware of what she should do but doesn't always follow guidelines

## 2020-02-26 NOTE — DIETITIAN INITIAL EVALUATION ADULT. - ENERGY NEEDS
Energy needs based on critical care guidelines using 22-25 kcals/kg IBW: 9333-4452 kcals.  Ht: 63"  Wt: 335  BMI: 59.3 kg/m2   IBW: 115 (+/-10%)     291% IBW  Edema:  3+ left leg/foot, 4+ right leg/foot      Skin: no pressure injuries documented

## 2020-02-26 NOTE — PHYSICAL THERAPY INITIAL EVALUATION ADULT - GENERAL OBSERVATIONS, REHAB EVAL
Patient received supine in bed in MICU, NAD, VSS, +4L O2 via NC, +PIV capped, +ICU monitors, RN present throughout

## 2020-02-26 NOTE — PROGRESS NOTE ADULT - ASSESSMENT
75 yo F w/ hx of FREDI, obesity hypoventilation syndrome, CKD3, DM2, LE lymphedema, on CPAP at night (non-compliant), presenting with worsening SOB and LE edema, admitted for acute on chronic hypercarbic respiratory failure.    #Neuro  lethargic upon admission  - now mentating well, no deficits, AAOx3     #CV  HTN  - c/w home HTN meds clonidine 0.2mg. metoprolol 50mg, doxazosin 4mg   - transition to home Lasix 40mg PO daily    #Pulm  FREDI, Obesity hypoventilation syndrome, non compliant with CPAP  Acute on chronic hypercarbic respiratory failure  - At admission, CXR pulm edema and b/l pleural effusions  - c/w AVAPS at night, continually reinforce use at home. Can use home CPAP machine while in hospital (once approved by [employer])  - ABGs post-extubation showing pCO2 70s, mentating well, may be baseline    #Renal  - baseline in 2016 1.23, upon admission SCr 1.63  - LANE?  - monitor BMP    #GI  - NPO while on BIPAP  - DASH/CC diet    #ID  - afebrile, no leukocytosis    #Heme  Anemia   - Hgb appears to be stable  - no signs of active bleeding    #Endo  Hx of hypothyroidism  - c/w home levothyroxine 25mcg daily    DM Type 2  - HbA1c (2/24): 6.7%  - ISS    #DVT  - HSQ for ppx  - f/u LE Duplex b/l to r/o DVT    Smith Anton, PGY-1  MICU  Contact/Pager: [phone] / [phone] 73 yo F w/ hx of FREDI, obesity hypoventilation syndrome, CKD3, DM2, LE lymphedema, on CPAP at night (non-compliant), presenting with worsening SOB and LE edema, admitted for acute on chronic hypercarbic respiratory failure.    #Neuro  lethargic upon admission  - now mentating well, no deficits, AAOx3     #CV  HTN  - c/w home HTN meds clonidine 0.2mg. metoprolol 50mg, doxazosin 4mg   - transition to home Lasix 40mg PO daily    #Pulm  FREDI, Obesity hypoventilation syndrome, non compliant with CPAP  Acute on chronic hypercarbic respiratory failure  - At admission, CXR pulm edema and b/l pleural effusions  - c/w AVAPS at night, continually reinforce use at home. Can use home CPAP machine while in hospital (once approved by [employer])  - ABGs post-extubation showing pCO2 70s, mentating well, may be baseline  - Keep O2 sats 88-92%    #Renal  LANE  - at admission SCr 1.63 (baseline in SCr 1.23 in 2016)  - Slight uptrend in SCr to 2.0  - monitor BMP    #GI  - NPO while on BIPAP  - DASH/CC diet    #ID  - afebrile, no leukocytosis    #Heme  Anemia   - Hgb appears to be stable  - no signs of active bleeding    #Endo  Hx of hypothyroidism  - c/w home levothyroxine 25mcg daily    DM Type 2  - HbA1c (2/24): 6.7%  - ISS    #DVT  - HSQ for ppx  - f/u LE Duplex b/l to r/o DVT    Smith Anton, PGY-1  MICU  Contact/Pager: [phone] / [phone] 73 yo F w/ hx of FREDI, obesity hypoventilation syndrome, CKD3, DM2, LE lymphedema, on CPAP at night (non-compliant), presenting with worsening SOB and LE edema, admitted for acute on chronic hypercarbic respiratory failure.    #Neuro  lethargic upon admission  - now mentating well, no deficits, AAOx3     #CV  HTN  - c/w home HTN meds clonidine 0.2mg. metoprolol 50mg, doxazosin 4mg   - transition to home Lasix 40mg PO daily    #Pulm  FREDI, Obesity hypoventilation syndrome, non compliant with CPAP  Acute on chronic hypercarbic respiratory failure  - At admission, CXR pulm edema and b/l pleural effusions  - c/w AVAPS at night, continually reinforce use at home. Can use home CPAP machine while in hospital (once approved by [employer])  - ABGs post-extubation showing pCO2 70s, mentating well, may be baseline  - Keep O2 sats 88-92%    #Renal  LANE  - at admission SCr 1.63 (baseline in SCr 1.23 in 2016)  - Slight uptrend in SCr to 2.0  - monitor BMP    #GI  - NPO while on BIPAP  - DASH/CC diet    #ID  - afebrile, no leukocytosis    #Heme  Anemia   - Hgb appears to be stable  - no signs of active bleeding    #Endo  Hx of hypothyroidism  - c/w home levothyroxine 25mcg daily    DM Type 2  - HbA1c (2/24): 6.7%  - ISS    #DVT  - HSQ for ppx  - f/u LE Duplex b/l to r/o DVT    #ETHICS  - DNR/DNI (MOLST in chart)    Smith Anton, PGY-1  MICU  Contact/Pager: [phone] / [phone]

## 2020-02-26 NOTE — PHYSICAL THERAPY INITIAL EVALUATION ADULT - PERTINENT HX OF CURRENT PROBLEM, REHAB EVAL
73 yo F presenting with worsening SOB and LE edema, admitted to MICU for acute on chronic hypercarbic respiratory failure. Pt became more lethargic, responsive only to painful stimuli, pCO2 >104, likely 2/2 obstruction by tongue while on AVAPS.  Pt intubated on 2/24 with improvement in mental status shortly afterwards, able to follow commands and answer questions appropriately, extubated less than 24 hours later on 2/25. 75 yo F presenting with worsening SOB and LE edema, admitted to MICU for acute on chronic hypercarbic respiratory failure. Pt became more lethargic, responsive only to painful stimuli, pCO2 >104, likely 2/2 obstruction by tongue while on AVAPS.  Pt intubated on 2/24 with improvement in mental status shortly afterwards, able to follow commands and answer questions appropriately, extubated less than 24 hours later on 2/25. 2/26: Patient pending LE dopplers, cleared for PT by MD Schofield

## 2020-02-26 NOTE — CHART NOTE - NSCHARTNOTEFT_GEN_A_CORE
Upon Nutritional Assessment by the Registered Dietitian your patient was determined to meet criteria / has evidence of the following diagnosis/diagnoses:          [ ]  Mild Protein Calorie Malnutrition        [ ]  Moderate Protein Calorie Malnutrition        [ ] Severe Protein Calorie Malnutrition        [ ] Unspecified Protein Calorie Malnutrition        [ ] Underweight / BMI <19        [x ] Morbid Obesity / BMI > 40      Findings as based on:  [x ] Comprehensive nutrition assessment   [ ] Nutrition Focused Physical Exam  [ ] Other:       Nutrition Plan/Recommendations:  pt declined nutrition education at this time, stated she is aware of what she is supposed to eat        PROVIDER Section:     By signing this assessment you are acknowledging and agree with the diagnosis/diagnoses assigned by the Registered Dietitian    Comments:

## 2020-02-26 NOTE — PHYSICAL THERAPY INITIAL EVALUATION ADULT - PLANNED THERAPY INTERVENTIONS, PT EVAL
gait training/balance training/bed mobility training/Stair negotiation GOAL: Patient will negotiate up / down 7 steps with supervision in 2 weeks/transfer training

## 2020-02-26 NOTE — PROGRESS NOTE ADULT - SUBJECTIVE AND OBJECTIVE BOX
CHIEF COMPLAINT: acute hypercarbic respiratory failure    OVERNIGHT / SUBJECTIVE:  -Overnight, no acute events, was on AVAPS.  -Today, pt was seen and examined by bedside in AM. Remains conversational, reports she feels better with no complaints.    REVIEW OF SYSTEMS:    CONSTITUTIONAL: No weakness, fevers or chills  EYES/ENT: No visual changes;  No vertigo or throat pain   NECK: No pain or stiffness  RESPIRATORY: No cough, wheezing, hemoptysis; No shortness of breath  CARDIOVASCULAR: No chest pain or palpitations  GASTROINTESTINAL: No abdominal or epigastric pain. No nausea, vomiting, or hematemesis; No diarrhea or constipation. No melena or hematochezia.  GENITOURINARY: No dysuria, frequency or hematuria  NEUROLOGICAL: No numbness or weakness  SKIN: No itching, burning, rashes, or lesions   All other review of systems is negative unless indicated above.      OBJECTIVE:  ICU Vital Signs Last 24 Hrs  T(C): 36.8 (26 Feb 2020 04:00), Max: 37.4 (25 Feb 2020 12:00)  T(F): 98.2 (26 Feb 2020 04:00), Max: 99.3 (25 Feb 2020 12:00)  HR: 85 (26 Feb 2020 06:00) (54 - 95)  BP: 116/54 (26 Feb 2020 06:00) (100/54 - 159/73)  BP(mean): 78 (26 Feb 2020 06:00) (72 - 105)  ABP: --  ABP(mean): --  RR: 49 (26 Feb 2020 06:00) (15 - 49)  SpO2: 83% (26 Feb 2020 06:00) (81% - 100%)        02-25 @ 07:01  -  02-26 @ 07:00  --------------------------------------------------------  IN: 1250 mL / OUT: 1700 mL / NET: -450 mL      CAPILLARY BLOOD GLUCOSE      POCT Blood Glucose.: 122 mg/dL (25 Feb 2020 21:18)  POCT Blood Glucose.: 130 mg/dL (25 Feb 2020 17:07)  POCT Blood Glucose.: 95 mg/dL (25 Feb 2020 13:11)    I&O's Summary    25 Feb 2020 07:01  -  26 Feb 2020 07:00  --------------------------------------------------------  IN: 1250 mL / OUT: 1700 mL / NET: -450 mL        PHYSICAL EXAM  GENERAL: NAD, well-developed  HEAD:  Atraumatic, Normocephalic  EYES: EOMI, PERRLA, conjunctiva and sclera clear  NECK: Supple, No JVD  CHEST/LUNG: Clear to auscultation bilaterally; No wheeze  HEART: Regular rate and rhythm; No murmurs, rubs, or gallops  ABDOMEN: Soft, Nontender, Nondistended; Bowel sounds present  EXTREMITIES:  2+ Peripheral Pulses, No clubbing, cyanosis, or edema  PSYCH: AAOx3  SKIN: No rashes or lesions    LABS:                        9.3    9.27  )-----------( 169      ( 26 Feb 2020 00:21 )             32.1     02-26    145  |  103  |  55<H>  ----------------------------<  99  5.3   |  31  |  2.03<H>    Ca    8.9      26 Feb 2020 00:21  Phos  4.9     02-26  Mg     2.3     02-26    TPro  6.5  /  Alb  3.1<L>  /  TBili  0.5  /  DBili  x   /  AST  15  /  ALT  19  /  AlkPhos  78  02-26        LINES:    HOSPITAL MEDICATIONS:  Standing Meds:  allopurinol 300 milliGRAM(s) Oral daily  aspirin  chewable 81 milliGRAM(s) Oral daily  atorvastatin 10 milliGRAM(s) Oral at bedtime  chlorhexidine 4% Liquid 1 Application(s) Topical <User Schedule>  cloNIDine 0.2 milliGRAM(s) Oral daily  dextrose 5%. 1000 milliLiter(s) IV Continuous <Continuous>  dextrose 50% Injectable 12.5 Gram(s) IV Push once  doxazosin 4 milliGRAM(s) Oral daily  furosemide   Injectable 20 milliGRAM(s) IV Push daily  heparin  Injectable 7500 Unit(s) SubCutaneous every 8 hours  insulin lispro (HumaLOG) corrective regimen sliding scale   SubCutaneous three times a day before meals  insulin lispro (HumaLOG) corrective regimen sliding scale   SubCutaneous at bedtime  levothyroxine Injectable 12.5 MICROGram(s) IV Push at bedtime  metoprolol succinate ER 50 milliGRAM(s) Oral daily  nystatin Powder 1 Application(s) Topical two times a day      PRN Meds:  dextrose 40% Gel 15 Gram(s) Oral once PRN  glucagon  Injectable 1 milliGRAM(s) IntraMuscular once PRN      LABS:                        9.3    9.27  )-----------( 169      ( 26 Feb 2020 00:21 )             32.1     Hgb Trend: 9.3<--, 9.4<--, 10.2<--, 10.8<--  02-26    145  |  103  |  55<H>  ----------------------------<  99  5.3   |  31  |  2.03<H>    Ca    8.9      26 Feb 2020 00:21  Phos  4.9     02-26  Mg     2.3     02-26    TPro  6.5  /  Alb  3.1<L>  /  TBili  0.5  /  DBili  x   /  AST  15  /  ALT  19  /  AlkPhos  78  02-26    Creatinine Trend: 2.03<--, 1.76<--, 1.79<--, 1.56<--, 1.58<--, 1.63<--      Arterial Blood Gas:  02-26 @ 00:18  7.31/73/149/36/99/8.0  ABG lactate: --  Arterial Blood Gas:  02-25 @ 11:18  7.30/74/84/35/96/7.4  ABG lactate: --  Arterial Blood Gas:  02-24 @ 23:59  7.49/42/139/32/100/8.1  ABG lactate: --  Arterial Blood Gas:  02-24 @ 17:10  7.29/64/92/30/98/2.3  ABG lactate: --  Arterial Blood Gas:  02-24 @ 14:29  7.12/>104/112/34/98/2.1  ABG lactate: --  Arterial Blood Gas:  02-24 @ 09:58  7.22/83/133/32/99/3.4  ABG lactate: --                MICROBIOLOGY:     RADIOLOGY: Reviewed CHIEF COMPLAINT: acute hypercarbic respiratory failure    OVERNIGHT / SUBJECTIVE:  -Overnight, no acute events, was on AVAPS.  -Today, pt was seen and examined by bedside in AM. Remains conversational, reports she feels better with no complaints.    REVIEW OF SYSTEMS:    CONSTITUTIONAL: No weakness, fevers or chills  EYES/ENT: No visual changes;  No vertigo or throat pain   NECK: No pain or stiffness  RESPIRATORY: No cough, wheezing, hemoptysis; No shortness of breath  CARDIOVASCULAR: No chest pain or palpitations  GASTROINTESTINAL: No abdominal or epigastric pain. No nausea, vomiting, or hematemesis; No diarrhea or constipation. No melena or hematochezia.  GENITOURINARY: No dysuria, frequency or hematuria  NEUROLOGICAL: No numbness or weakness  SKIN: No itching, burning, rashes, or lesions   All other review of systems is negative unless indicated above.      OBJECTIVE:  ICU Vital Signs Last 24 Hrs  T(C): 36.8 (26 Feb 2020 04:00), Max: 37.4 (25 Feb 2020 12:00)  T(F): 98.2 (26 Feb 2020 04:00), Max: 99.3 (25 Feb 2020 12:00)  HR: 85 (26 Feb 2020 06:00) (54 - 95)  BP: 116/54 (26 Feb 2020 06:00) (100/54 - 159/73)  BP(mean): 78 (26 Feb 2020 06:00) (72 - 105)  ABP: --  ABP(mean): --  RR: 49 (26 Feb 2020 06:00) (15 - 49)  SpO2: 83% (26 Feb 2020 06:00) (81% - 100%)        02-25 @ 07:01  -  02-26 @ 07:00  --------------------------------------------------------  IN: 1250 mL / OUT: 1700 mL / NET: -450 mL      CAPILLARY BLOOD GLUCOSE      POCT Blood Glucose.: 122 mg/dL (25 Feb 2020 21:18)  POCT Blood Glucose.: 130 mg/dL (25 Feb 2020 17:07)  POCT Blood Glucose.: 95 mg/dL (25 Feb 2020 13:11)    I&O's Summary    25 Feb 2020 07:01  -  26 Feb 2020 07:00  --------------------------------------------------------  IN: 1250 mL / OUT: 1700 mL / NET: -450 mL        PHYSICAL EXAM  GENERAL: NAD, well-developed, obese woman  HEAD:  Atraumatic, Normocephalic  EYES: EOMI, PERRLA, conjunctiva and sclera clear  NECK: Supple, No JVD  CHEST/LUNG: Auscultation limited due to body habitus but seems to be clear bilaterally; No wheeze  HEART: Regular rate and rhythm; No murmurs, rubs, or gallops  ABDOMEN: Soft, Nontender, Nondistended; Bowel sounds present  EXTREMITIES:  2+ Peripheral Pulses, no clubbing or cyanosis; LEs chronically edematous R > L, erythematous, warm to touch  SKIN: No rashes or lesions, feet/legs dry w/ scale and erythema, warm to touch    LABS:                        9.3    9.27  )-----------( 169      ( 26 Feb 2020 00:21 )             32.1     02-26    145  |  103  |  55<H>  ----------------------------<  99  5.3   |  31  |  2.03<H>    Ca    8.9      26 Feb 2020 00:21  Phos  4.9     02-26  Mg     2.3     02-26    TPro  6.5  /  Alb  3.1<L>  /  TBili  0.5  /  DBili  x   /  AST  15  /  ALT  19  /  AlkPhos  78  02-26        LINES:    HOSPITAL MEDICATIONS:  Standing Meds:  allopurinol 300 milliGRAM(s) Oral daily  aspirin  chewable 81 milliGRAM(s) Oral daily  atorvastatin 10 milliGRAM(s) Oral at bedtime  chlorhexidine 4% Liquid 1 Application(s) Topical <User Schedule>  cloNIDine 0.2 milliGRAM(s) Oral daily  dextrose 5%. 1000 milliLiter(s) IV Continuous <Continuous>  dextrose 50% Injectable 12.5 Gram(s) IV Push once  doxazosin 4 milliGRAM(s) Oral daily  furosemide   Injectable 20 milliGRAM(s) IV Push daily  heparin  Injectable 7500 Unit(s) SubCutaneous every 8 hours  insulin lispro (HumaLOG) corrective regimen sliding scale   SubCutaneous three times a day before meals  insulin lispro (HumaLOG) corrective regimen sliding scale   SubCutaneous at bedtime  levothyroxine Injectable 12.5 MICROGram(s) IV Push at bedtime  metoprolol succinate ER 50 milliGRAM(s) Oral daily  nystatin Powder 1 Application(s) Topical two times a day      PRN Meds:  dextrose 40% Gel 15 Gram(s) Oral once PRN  glucagon  Injectable 1 milliGRAM(s) IntraMuscular once PRN      LABS:                        9.3    9.27  )-----------( 169      ( 26 Feb 2020 00:21 )             32.1     Hgb Trend: 9.3<--, 9.4<--, 10.2<--, 10.8<--  02-26    145  |  103  |  55<H>  ----------------------------<  99  5.3   |  31  |  2.03<H>    Ca    8.9      26 Feb 2020 00:21  Phos  4.9     02-26  Mg     2.3     02-26    TPro  6.5  /  Alb  3.1<L>  /  TBili  0.5  /  DBili  x   /  AST  15  /  ALT  19  /  AlkPhos  78  02-26    Creatinine Trend: 2.03<--, 1.76<--, 1.79<--, 1.56<--, 1.58<--, 1.63<--      Arterial Blood Gas:  02-26 @ 00:18  7.31/73/149/36/99/8.0  ABG lactate: --  Arterial Blood Gas:  02-25 @ 11:18  7.30/74/84/35/96/7.4  ABG lactate: --  Arterial Blood Gas:  02-24 @ 23:59  7.49/42/139/32/100/8.1  ABG lactate: --  Arterial Blood Gas:  02-24 @ 17:10  7.29/64/92/30/98/2.3  ABG lactate: --  Arterial Blood Gas:  02-24 @ 14:29  7.12/>104/112/34/98/2.1  ABG lactate: --  Arterial Blood Gas:  02-24 @ 09:58  7.22/83/133/32/99/3.4  ABG lactate: --                MICROBIOLOGY:     RADIOLOGY: Reviewed

## 2020-02-27 ENCOUNTER — TRANSCRIPTION ENCOUNTER (OUTPATIENT)
Age: 75
End: 2020-02-27

## 2020-02-27 LAB
ALBUMIN SERPL ELPH-MCNC: 3.3 G/DL — SIGNIFICANT CHANGE UP (ref 3.3–5)
ALP SERPL-CCNC: 72 U/L — SIGNIFICANT CHANGE UP (ref 40–120)
ALT FLD-CCNC: 12 U/L — SIGNIFICANT CHANGE UP (ref 10–45)
ANION GAP SERPL CALC-SCNC: 11 MMOL/L — SIGNIFICANT CHANGE UP (ref 5–17)
AST SERPL-CCNC: 10 U/L — SIGNIFICANT CHANGE UP (ref 10–40)
BILIRUB SERPL-MCNC: 0.8 MG/DL — SIGNIFICANT CHANGE UP (ref 0.2–1.2)
BUN SERPL-MCNC: 50 MG/DL — HIGH (ref 7–23)
CALCIUM SERPL-MCNC: 9.2 MG/DL — SIGNIFICANT CHANGE UP (ref 8.4–10.5)
CHLORIDE SERPL-SCNC: 102 MMOL/L — SIGNIFICANT CHANGE UP (ref 96–108)
CO2 SERPL-SCNC: 34 MMOL/L — HIGH (ref 22–31)
CREAT SERPL-MCNC: 1.65 MG/DL — HIGH (ref 0.5–1.3)
GLUCOSE BLDC GLUCOMTR-MCNC: 115 MG/DL — HIGH (ref 70–99)
GLUCOSE BLDC GLUCOMTR-MCNC: 116 MG/DL — HIGH (ref 70–99)
GLUCOSE BLDC GLUCOMTR-MCNC: 132 MG/DL — HIGH (ref 70–99)
GLUCOSE BLDC GLUCOMTR-MCNC: 203 MG/DL — HIGH (ref 70–99)
GLUCOSE SERPL-MCNC: 110 MG/DL — HIGH (ref 70–99)
HCT VFR BLD CALC: 32.9 % — LOW (ref 34.5–45)
HGB BLD-MCNC: 9.3 G/DL — LOW (ref 11.5–15.5)
MAGNESIUM SERPL-MCNC: 2.2 MG/DL — SIGNIFICANT CHANGE UP (ref 1.6–2.6)
MCHC RBC-ENTMCNC: 28.3 GM/DL — LOW (ref 32–36)
MCHC RBC-ENTMCNC: 29.2 PG — SIGNIFICANT CHANGE UP (ref 27–34)
MCV RBC AUTO: 103.5 FL — HIGH (ref 80–100)
NRBC # BLD: 0 /100 WBCS — SIGNIFICANT CHANGE UP (ref 0–0)
PHOSPHATE SERPL-MCNC: 3.3 MG/DL — SIGNIFICANT CHANGE UP (ref 2.5–4.5)
PLATELET # BLD AUTO: 179 K/UL — SIGNIFICANT CHANGE UP (ref 150–400)
POTASSIUM SERPL-MCNC: 4.8 MMOL/L — SIGNIFICANT CHANGE UP (ref 3.5–5.3)
POTASSIUM SERPL-SCNC: 4.8 MMOL/L — SIGNIFICANT CHANGE UP (ref 3.5–5.3)
PROT SERPL-MCNC: 6.7 G/DL — SIGNIFICANT CHANGE UP (ref 6–8.3)
RBC # BLD: 3.18 M/UL — LOW (ref 3.8–5.2)
RBC # FLD: 17.8 % — HIGH (ref 10.3–14.5)
SODIUM SERPL-SCNC: 147 MMOL/L — HIGH (ref 135–145)
WBC # BLD: 7.62 K/UL — SIGNIFICANT CHANGE UP (ref 3.8–10.5)
WBC # FLD AUTO: 7.62 K/UL — SIGNIFICANT CHANGE UP (ref 3.8–10.5)

## 2020-02-27 RX ORDER — ACETAMINOPHEN 500 MG
650 TABLET ORAL EVERY 6 HOURS
Refills: 0 | Status: DISCONTINUED | OUTPATIENT
Start: 2020-02-27 | End: 2020-03-02

## 2020-02-27 RX ADMIN — NYSTATIN CREAM 1 APPLICATION(S): 100000 CREAM TOPICAL at 04:50

## 2020-02-27 RX ADMIN — Medication 50 MILLIGRAM(S): at 21:21

## 2020-02-27 RX ADMIN — NYSTATIN CREAM 1 APPLICATION(S): 100000 CREAM TOPICAL at 17:59

## 2020-02-27 RX ADMIN — HEPARIN SODIUM 7500 UNIT(S): 5000 INJECTION INTRAVENOUS; SUBCUTANEOUS at 14:16

## 2020-02-27 RX ADMIN — Medication 20 MILLIGRAM(S): at 04:50

## 2020-02-27 RX ADMIN — Medication 25 MICROGRAM(S): at 04:50

## 2020-02-27 RX ADMIN — BIMATOPROST 1 DROP(S): 0.3 SOLUTION/ DROPS OPHTHALMIC at 21:27

## 2020-02-27 RX ADMIN — HEPARIN SODIUM 7500 UNIT(S): 5000 INJECTION INTRAVENOUS; SUBCUTANEOUS at 21:21

## 2020-02-27 RX ADMIN — Medication 4 MILLIGRAM(S): at 04:50

## 2020-02-27 RX ADMIN — Medication 300 MILLIGRAM(S): at 11:32

## 2020-02-27 RX ADMIN — HEPARIN SODIUM 7500 UNIT(S): 5000 INJECTION INTRAVENOUS; SUBCUTANEOUS at 04:51

## 2020-02-27 RX ADMIN — Medication 0.2 MILLIGRAM(S): at 21:21

## 2020-02-27 RX ADMIN — Medication 81 MILLIGRAM(S): at 12:10

## 2020-02-27 RX ADMIN — Medication 650 MILLIGRAM(S): at 14:33

## 2020-02-27 RX ADMIN — Medication 2: at 17:58

## 2020-02-27 RX ADMIN — ATORVASTATIN CALCIUM 10 MILLIGRAM(S): 80 TABLET, FILM COATED ORAL at 21:21

## 2020-02-27 RX ADMIN — Medication 650 MILLIGRAM(S): at 15:03

## 2020-02-27 NOTE — DISCHARGE NOTE PROVIDER - NSDCCPCAREPLAN_GEN_ALL_CORE_FT
PRINCIPAL DISCHARGE DIAGNOSIS  Diagnosis: Acute hypoxemic respiratory failure  Assessment and Plan of Treatment:       SECONDARY DISCHARGE DIAGNOSES  Diagnosis: Shortness of breath  Assessment and Plan of Treatment:     Diagnosis: Acidemia  Assessment and Plan of Treatment:     Diagnosis: Obesity hypoventilation syndrome  Assessment and Plan of Treatment:     Diagnosis: Hypercapnemia  Assessment and Plan of Treatment: PRINCIPAL DISCHARGE DIAGNOSIS  Diagnosis: Acute hypoxemic respiratory failure  Assessment and Plan of Treatment: Condition resolved.  Continue to utilize your CPAP at home at bedtime.      SECONDARY DISCHARGE DIAGNOSES  Diagnosis: Shortness of breath  Assessment and Plan of Treatment: Respiratory condition back to baseline.    Diagnosis: Acidemia  Assessment and Plan of Treatment: Condition resolved.    Diagnosis: Obesity hypoventilation syndrome  Assessment and Plan of Treatment:     Diagnosis: Hypercapnemia  Assessment and Plan of Treatment: PRINCIPAL DISCHARGE DIAGNOSIS  Diagnosis: Acute hypoxemic respiratory failure  Assessment and Plan of Treatment: Condition resolved.  Continue to utilize your CPAP at home at bedtime.      SECONDARY DISCHARGE DIAGNOSES  Diagnosis: Shortness of breath  Assessment and Plan of Treatment: Respiratory condition back to baseline.    Diagnosis: Acidemia  Assessment and Plan of Treatment: Condition resolved.    Diagnosis: Obesity hypoventilation syndrome  Assessment and Plan of Treatment: Follow up with your primary healthcare provider after discharge from Rehab.    Diagnosis: Hypercapnemia  Assessment and Plan of Treatment: Condition resolved.    Diagnosis: HTN (hypertension)  Assessment and Plan of Treatment: Low salt diet  Activity as tolerated.  Take all medication as prescribed.  Follow up with your medical doctor for routine blood pressure monitoring at your next visit.  Notify your doctor if you have any of the following symptoms:   Dizziness, Lightheadedness, Blurry vision, Headache, Chest pain, Shortness of breath    Diagnosis: Hypothyroidism  Assessment and Plan of Treatment: Continue with Levthyroxine as prescribed.    Diagnosis: CKD (chronic kidney disease), stage III  Assessment and Plan of Treatment: Avoid taking (NSAIDs) - (ex: Ibuprofen, Advil, Celebrex, Naprosyn)  Avoid taking any nephrotoxic agents (can harm kidneys) - Intravenous contrast for diagnostic testing, combination cold medications.  Have all medications adjusted for your renal function by your Health Care Provider.  Blood pressure control is important.  Take all medication as prescribed. PRINCIPAL DISCHARGE DIAGNOSIS  Diagnosis: Acute hypoxemic respiratory failure  Assessment and Plan of Treatment: Condition resolved.  Continue with CPAP at home at bedtime.  follow up with your primary healthcare provider 1-2 weeks after discharge from Rehab.      SECONDARY DISCHARGE DIAGNOSES  Diagnosis: Shortness of breath  Assessment and Plan of Treatment: Respiratory condition back to baseline.    Diagnosis: Acidemia  Assessment and Plan of Treatment: Condition resolved.    Diagnosis: Obesity hypoventilation syndrome  Assessment and Plan of Treatment: Follow up with your primary healthcare provider after discharge from Rehab.    Diagnosis: Hypercapnemia  Assessment and Plan of Treatment: Condition resolved.    Diagnosis: HTN (hypertension)  Assessment and Plan of Treatment: Low salt diet  Activity as tolerated.  Take all medication as prescribed.  Follow up with your medical doctor for routine blood pressure monitoring at your next visit.  Notify your doctor if you have any of the following symptoms:   Dizziness, Lightheadedness, Blurry vision, Headache, Chest pain, Shortness of breath    Diagnosis: Hypothyroidism  Assessment and Plan of Treatment: Continue with Levthyroxine as prescribed.    Diagnosis: CKD (chronic kidney disease), stage III  Assessment and Plan of Treatment: Avoid taking (NSAIDs) - (ex: Ibuprofen, Advil, Celebrex, Naprosyn)  Avoid taking any nephrotoxic agents (can harm kidneys) - Intravenous contrast for diagnostic testing, combination cold medications.  Have all medications adjusted for your renal function by your Health Care Provider.  Blood pressure control is important.  Take all medication as prescribed.

## 2020-02-27 NOTE — DISCHARGE NOTE PROVIDER - CARE PROVIDER_API CALL
Ulysses Porter (DO)  Internal Medicine; Pulmonary Disease; Sleep Medicine  [address] [facility], [facility] [address]  [place] [address]  Phone: [phone]  Fax: [phone]  Follow Up Time:     Coleen Echavarria)  Internal Medicine  [address] [place] [address]  Phone: [phone]  Fax: [phone]  Follow Up Time:

## 2020-02-27 NOTE — DISCHARGE NOTE PROVIDER - CARE PROVIDERS DIRECT ADDRESSES
[email or web address]

## 2020-02-27 NOTE — DISCHARGE NOTE PROVIDER - HOSPITAL COURSE
73 y/o female h/o tanner obesity hypoventilation syndrome, ckd3, dm2, LE lymphedema, on CPAP at night but presents with worsening SOB and LE edema. Pt became acutely SOB at rest today, progressively worsened.  Pt endorses lethargy.  Pt denies cough or recent URI sx, denies cp, f/c/n/v/d.  Pt found to be hypoxic in the 70s on RA when EMS arrived, and improved to 100% O2 w/ NRB. Pt reports that she has been noncompliant with her CPAP for the last several weeks as her  has been in the hospital for a hip replacement and she does not know how to put on her CPAP mask or work the machine. 73 y/o female h/o tanner obesity hypoventilation syndrome, ckd3, dm2, LE lymphedema, on CPAP at night but presents with worsening SOB and LE edema. Pt became acutely SOB at rest today, progressively worsened.  Pt endorses lethargy.  Pt denies cough or recent URI sx, denies cp, f/c/n/v/d.  Pt found to be hypoxic in the 70s on RA when EMS arrived, and improved to 100% O2 w/ NRB. Pt reports that she has been noncompliant with her CPAP for the last several weeks as her  has been in the hospital for a hip replacement and she does not know how to put on her CPAP mask or work the machine.        Admitted to MICU; Intubated x24H; Txfr to medical unit; Bipap maintained at [facility]; Pulmonary followed ... 73 y/o female h/o tanner obesity hypoventilation syndrome, ckd3, dm2, LE lymphedema, on CPAP at night but presents with worsening SOB and LE edema. Pt became acutely SOB at rest today, progressively worsened.  Pt endorses lethargy.  Pt denies cough or recent URI sx, denies cp, f/c/n/v/d.  Pt found to be hypoxic in the 70s on RA when EMS arrived, and improved to 100% O2 w/ NRB. Pt reports that she has been noncompliant with her CPAP for the last several weeks as her  has been in the hospital for a hip replacement and she does not know how to put on her CPAP mask or work the machine.        Admitted to MICU; Intubated x24H; Txfr to medical unit; Bipap maintained at [facility]; Pulmonary followed. Condition clinically stable. Medically cleared for dc to [facility] by Pulmonary & Dr Rocha

## 2020-02-27 NOTE — PROGRESS NOTE ADULT - ASSESSMENT
75 y/o female PMHx FREDI, obesity hypoventilation syndrome, DM2, HLD, LE lymphedema, on CPAP at night recently noncompliant, presents with worsening SOB and LE edema.    # acute hypercapnic and hypoxic resp failure   sp intubation now extubation  appreciate pulm care  serial ABGs, NIV as needed  outpt pulmonologist Dr. Porter  doppler LE no DVT    # LANE  last creat 1.2 in 12/2019  cont to trend- trending down  strict i os    # HTN   on clonidine and toprol XL  switched to oral lasix  TTE nml systolic fxn  CXR pulm edema    # hypothyroidism  takes synthroid 25mcg qd per [facility] EMR    # DM2   hold oral meds  SSI  a1c 6.7    DVt ppx  PT eval- dc planning- rehab    PCP Dr Echavarria ([facility]) - will update    Please call [employer] with questions [phone].

## 2020-02-27 NOTE — PROGRESS NOTE ADULT - SUBJECTIVE AND OBJECTIVE BOX
Patient is a 74y old  Female who presents with a chief complaint of acute hypercarbic respiratory failure (26 Feb 2020 07:15)      INTERVAL HPI/OVERNIGHT EVENTS:  breathing comfortably on bed  c/o uncomfortable bed      Vital Signs Last 24 Hrs  T(C): 36.8 (27 Feb 2020 12:17), Max: 37.1 (26 Feb 2020 16:00)  T(F): 98.3 (27 Feb 2020 12:17), Max: 98.8 (26 Feb 2020 16:00)  HR: 51 (27 Feb 2020 12:17) (51 - 91)  BP: 145/73 (27 Feb 2020 12:17) (108/57 - 145/73)  BP(mean): 92 (26 Feb 2020 17:00) (78 - 92)  RR: 18 (27 Feb 2020 12:17) (18 - 29)  SpO2: 93% (27 Feb 2020 12:17) (89% - 97%)    allopurinol 300 milliGRAM(s) Oral daily  aspirin  chewable 81 milliGRAM(s) Oral daily  atorvastatin 10 milliGRAM(s) Oral at bedtime  bimatoprost 0.01% Ophthalmic Solution 1 Drop(s) Both EYES at bedtime  cloNIDine 0.2 milliGRAM(s) Oral daily  dextrose 40% Gel 15 Gram(s) Oral once PRN  dextrose 5%. 1000 milliLiter(s) IV Continuous <Continuous>  dextrose 50% Injectable 12.5 Gram(s) IV Push once  doxazosin 4 milliGRAM(s) Oral daily  furosemide    Tablet 20 milliGRAM(s) Oral daily  glucagon  Injectable 1 milliGRAM(s) IntraMuscular once PRN  heparin  Injectable 7500 Unit(s) SubCutaneous every 8 hours  insulin lispro (HumaLOG) corrective regimen sliding scale   SubCutaneous three times a day before meals  insulin lispro (HumaLOG) corrective regimen sliding scale   SubCutaneous at bedtime  levothyroxine 25 MICROGram(s) Oral daily  metoprolol succinate ER 50 milliGRAM(s) Oral daily  nystatin Powder 1 Application(s) Topical two times a day      PHYSICAL EXAM:  GENERAL: NAD, morbid obesity   EYES: conjunctiva and sclera clear  ENMT: Moist mucous membranes  NECK: Supple, No JVD, Normal thyroid  NERVOUS SYSTEM:  Alert & Oriented X,   CHEST/LUNG: Clear to auscultation bilaterally; No rales, rhonchi, wheezing, or rubs  HEART: Regular rate and rhythm; No murmurs, rubs, or gallops  ABDOMEN: Soft, Nontender, Nondistended; Bowel sounds present  EXTREMITIES:  2+ edema  LYMPH: No lymphadenopathy noted  SKIN: No rashes or lesions    Consultant(s) Notes Reviewed:  [x ] YES  [ ] NO  Care Discussed with Consultants/Other Providers [ x] YES  [ ] NO    LABS:                        9.3    7.62  )-----------( 179      ( 27 Feb 2020 07:14 )             32.9     02-27    147<H>  |  102  |  50<H>  ----------------------------<  110<H>  4.8   |  34<H>  |  1.65<H>    Ca    9.2      27 Feb 2020 07:13  Phos  3.3     02-27  Mg     2.2     02-27    TPro  6.7  /  Alb  3.3  /  TBili  0.8  /  DBili  x   /  AST  10  /  ALT  12  /  AlkPhos  72  02-27        CAPILLARY BLOOD GLUCOSE      POCT Blood Glucose.: 115 mg/dL (27 Feb 2020 12:26)  POCT Blood Glucose.: 116 mg/dL (27 Feb 2020 08:19)  POCT Blood Glucose.: 160 mg/dL (26 Feb 2020 22:18)  POCT Blood Glucose.: 152 mg/dL (26 Feb 2020 17:22)      ABG - ( 26 Feb 2020 00:18 )  pH, Arterial: 7.31  pH, Blood: x     /  pCO2: 73    /  pO2: 149   / HCO3: 36    / Base Excess: 8.0   /  SaO2: 99                      RADIOLOGY & ADDITIONAL TESTS:    Imaging Personally Reviewed:  [x ] YES  [ ] NO

## 2020-02-27 NOTE — DISCHARGE NOTE PROVIDER - NSDCMRMEDTOKEN_GEN_ALL_CORE_FT
allopurinol 300 mg oral tablet: 1 tab(s) orally once a day  aspirin 81 mg oral tablet, chewable: 1 tab(s) orally once a day  atorvastatin 10 mg oral tablet: 1 tab(s) orally once a day (at bedtime)  Calcium 600+D 600 mg-200 intl units oral tablet: 1 tab(s) orally once a day  cloNIDine 0.2 mg oral tablet: 1 tab(s) orally once a day  docusate sodium 100 mg oral capsule: 1 cap(s) orally 2 times a day, As Needed  doxazosin 4 mg oral tablet: 1 tab(s) orally once a day  ferrous sulfate 325 mg (65 mg elemental iron) oral tablet: 1 tab(s) orally once a day  Fish Oil oral capsule: 1 cap(s) orally once a day  furosemide 20 mg oral tablet: 1 tab(s) orally once a day  levothyroxine 25 mcg (0.025 mg) oral tablet: 1 tab(s) orally once a day  Lumigan 0.01% ophthalmic solution: 1 drop(s) to each affected eye once a day (in the evening)  metFORMIN 500 mg oral tablet, extended release: 1 tab(s) orally 3 times a day  Metoprolol Succinate ER 50 mg oral tablet, extended release: 1 tab(s) orally once a day (at bedtime)  multivitamin: 1 tab(s) orally once a day  Vitamin B12 1000 mcg oral tablet: 1 tab(s) orally once a day  Vitamin C 500 mg oral tablet: 1 tab(s) orally once a day acetaminophen 325 mg oral tablet: 2 tab(s) orally every 6 hours, As needed, Mild and Moderate Pain (1 - 6)  allopurinol 300 mg oral tablet: 1 tab(s) orally once a day  aspirin 81 mg oral tablet, chewable: 1 tab(s) orally once a day  atorvastatin 10 mg oral tablet: 1 tab(s) orally once a day (at bedtime)  Calcium 600+D 600 mg-200 intl units oral tablet: 1 tab(s) orally once a day  cloNIDine 0.2 mg oral tablet: 1 tab(s) orally once a day  docusate sodium 100 mg oral capsule: 1 cap(s) orally 2 times a day, As Needed  doxazosin 4 mg oral tablet: 1 tab(s) orally once a day  ferrous sulfate 325 mg (65 mg elemental iron) oral tablet: 1 tab(s) orally once a day  Fish Oil oral capsule: 1 cap(s) orally once a day  furosemide 20 mg oral tablet: 1 tab(s) orally once a day  levothyroxine 25 mcg (0.025 mg) oral tablet: 1 tab(s) orally once a day  Lumigan 0.01% ophthalmic solution: 1 drop(s) to each affected eye once a day (in the evening)  metFORMIN 500 mg oral tablet, extended release: 1 tab(s) orally 3 times a day  Metoprolol Succinate ER 50 mg oral tablet, extended release: 1 tab(s) orally once a day (at bedtime)  multivitamin: 1 tab(s) orally once a day  nystatin 100,000 units/g topical powder: 1 application topically 2 times a day  Vitamin B12 1000 mcg oral tablet: 1 tab(s) orally once a day  Vitamin C 500 mg oral tablet: 1 tab(s) orally once a day

## 2020-02-28 LAB
ANION GAP SERPL CALC-SCNC: 8 MMOL/L — SIGNIFICANT CHANGE UP (ref 5–17)
BASE EXCESS BLDA CALC-SCNC: 8.8 MMOL/L — HIGH (ref -2–2)
BUN SERPL-MCNC: 60 MG/DL — HIGH (ref 7–23)
CALCIUM SERPL-MCNC: 9.3 MG/DL — SIGNIFICANT CHANGE UP (ref 8.4–10.5)
CHLORIDE SERPL-SCNC: 100 MMOL/L — SIGNIFICANT CHANGE UP (ref 96–108)
CO2 BLDA-SCNC: 39 MMOL/L — HIGH (ref 22–30)
CO2 SERPL-SCNC: 32 MMOL/L — HIGH (ref 22–31)
CREAT SERPL-MCNC: 1.91 MG/DL — HIGH (ref 0.5–1.3)
GLUCOSE BLDC GLUCOMTR-MCNC: 123 MG/DL — HIGH (ref 70–99)
GLUCOSE BLDC GLUCOMTR-MCNC: 128 MG/DL — HIGH (ref 70–99)
GLUCOSE BLDC GLUCOMTR-MCNC: 128 MG/DL — HIGH (ref 70–99)
GLUCOSE BLDC GLUCOMTR-MCNC: 135 MG/DL — HIGH (ref 70–99)
GLUCOSE SERPL-MCNC: 135 MG/DL — HIGH (ref 70–99)
HCO3 BLDA-SCNC: 37 MMOL/L — HIGH (ref 21–29)
HCT VFR BLD CALC: 34.1 % — LOW (ref 34.5–45)
HGB BLD-MCNC: 9.7 G/DL — LOW (ref 11.5–15.5)
HOROWITZ INDEX BLDA+IHG-RTO: 36 — SIGNIFICANT CHANGE UP
MCHC RBC-ENTMCNC: 28.4 GM/DL — LOW (ref 32–36)
MCHC RBC-ENTMCNC: 29.5 PG — SIGNIFICANT CHANGE UP (ref 27–34)
MCV RBC AUTO: 103.6 FL — HIGH (ref 80–100)
NRBC # BLD: 0 /100 WBCS — SIGNIFICANT CHANGE UP (ref 0–0)
PCO2 BLDA: 76 MMHG — CRITICAL HIGH (ref 32–46)
PH BLDA: 7.31 — LOW (ref 7.35–7.45)
PLATELET # BLD AUTO: 179 K/UL — SIGNIFICANT CHANGE UP (ref 150–400)
PO2 BLDA: 89 MMHG — SIGNIFICANT CHANGE UP (ref 74–108)
POTASSIUM SERPL-MCNC: 4.9 MMOL/L — SIGNIFICANT CHANGE UP (ref 3.5–5.3)
POTASSIUM SERPL-SCNC: 4.9 MMOL/L — SIGNIFICANT CHANGE UP (ref 3.5–5.3)
RBC # BLD: 3.29 M/UL — LOW (ref 3.8–5.2)
RBC # FLD: 17.5 % — HIGH (ref 10.3–14.5)
SAO2 % BLDA: 97 % — HIGH (ref 92–96)
SODIUM SERPL-SCNC: 140 MMOL/L — SIGNIFICANT CHANGE UP (ref 135–145)
WBC # BLD: 8.37 K/UL — SIGNIFICANT CHANGE UP (ref 3.8–10.5)
WBC # FLD AUTO: 8.37 K/UL — SIGNIFICANT CHANGE UP (ref 3.8–10.5)

## 2020-02-28 RX ADMIN — ATORVASTATIN CALCIUM 10 MILLIGRAM(S): 80 TABLET, FILM COATED ORAL at 21:34

## 2020-02-28 RX ADMIN — HEPARIN SODIUM 7500 UNIT(S): 5000 INJECTION INTRAVENOUS; SUBCUTANEOUS at 21:34

## 2020-02-28 RX ADMIN — Medication 81 MILLIGRAM(S): at 11:35

## 2020-02-28 RX ADMIN — Medication 50 MILLIGRAM(S): at 21:34

## 2020-02-28 RX ADMIN — Medication 20 MILLIGRAM(S): at 05:31

## 2020-02-28 RX ADMIN — Medication 300 MILLIGRAM(S): at 11:35

## 2020-02-28 RX ADMIN — Medication 4 MILLIGRAM(S): at 05:31

## 2020-02-28 RX ADMIN — BIMATOPROST 1 DROP(S): 0.3 SOLUTION/ DROPS OPHTHALMIC at 21:35

## 2020-02-28 RX ADMIN — Medication 25 MICROGRAM(S): at 05:31

## 2020-02-28 RX ADMIN — HEPARIN SODIUM 7500 UNIT(S): 5000 INJECTION INTRAVENOUS; SUBCUTANEOUS at 13:47

## 2020-02-28 RX ADMIN — NYSTATIN CREAM 1 APPLICATION(S): 100000 CREAM TOPICAL at 18:00

## 2020-02-28 RX ADMIN — NYSTATIN CREAM 1 APPLICATION(S): 100000 CREAM TOPICAL at 05:31

## 2020-02-28 RX ADMIN — Medication 0.2 MILLIGRAM(S): at 21:34

## 2020-02-28 RX ADMIN — HEPARIN SODIUM 7500 UNIT(S): 5000 INJECTION INTRAVENOUS; SUBCUTANEOUS at 05:31

## 2020-02-28 RX ADMIN — Medication 650 MILLIGRAM(S): at 00:32

## 2020-02-28 RX ADMIN — Medication 650 MILLIGRAM(S): at 00:02

## 2020-02-28 NOTE — CONSULT NOTE ADULT - ASSESSMENT
Morbid obesity with obesity hypoventialtion syndrome and chronic hypercapneic respiratory failure  ACute on chronic hypercapnea with resp acidosis: Portable CXR sugg of pulm edema vs. prior imaging  TTE: preserved LVF  ? Lyphedema  CKD with increase in creatinine to 1.9, though some daily fluctuation  Despite normal LVF, CXR's suggestive of pulm edema on admission    REC    Repeat ABG  Monitor resp and renal status over weekend: tentative DC to [facility] on MON per status  Would increase lasix to 40 mg qd  Continue patients CPAP - home machine

## 2020-02-28 NOTE — CONSULT NOTE ADULT - SUBJECTIVE AND OBJECTIVE BOX
Pt. Repots sudden onset this am of left neck swelling, shortness of breath and chest pain.  Recent lung surgery (3 weeks ago)   PULMONARY CONSULT  Bharat Castellon MD  [phone]    Initial HPI on admission:  HPI:  73 y/o female h/o tanner obesity hypoventilation syndrome, ckd3, dm2, LE lymphedema, on CPAP at night but presents with worsening SOB and LE edema. Pt became acutely SOB at rest today, progressively worsened.  Pt endorses lethargy.  Pt denies cough or recent URI sx, denies cp, f/c/n/v/d.  Pt found to be hypoxic in the 70s on RA when EMS arrived, and improved to 100% O2 w/ NRB. Pt reports that she has been noncompliant with her CPAP for the last several weeks as her  has been in the hospital for a hip replacement and she does not know how to put on her CPAP mask or work the machine.     Patient intubated briefly in MICU and extubated with stable respiratory status  TTE: limited, yet nl LVF and no overt valvular disease  Stopped smoking 5 years ago: on prn nebulizer and inhalers at home  Today alert with LE edema  Last ABG on 2/26: 7.3/73 PCO2    PAST MEDICAL & SURGICAL HISTORY:  Obesity hypoventilation syndrome  Obesity, morbid  TANNER on CPAP  Ventral hernia  HLD (hyperlipidemia)  Diabetes mellitus type 2, controlled  Hypertension  S/P hernia repair: ventral hernia repair Jan 2016  S/P hernia repair: First ventral hernia repair in 1996 at [facility], Second ventral hernia repair with mesh at [facility] in 2007  S/P hysterectomy    FAMILY HISTORY:  Family history of diabetes mellitus in father      SOCIAL HISTORY:    Allergies    codeine (Rash)    Intolerances    Review of Systems:  unable to provide ROS 2/2 lethargy    Medications:  MEDICATIONS  (STANDING):  allopurinol 300 milliGRAM(s) Oral daily  aspirin  chewable 81 milliGRAM(s) Oral daily  atorvastatin 10 milliGRAM(s) Oral at bedtime  bimatoprost 0.01% Ophthalmic Solution 1 Drop(s) Both EYES at bedtime  cloNIDine 0.2 milliGRAM(s) Oral daily  dextrose 5%. 1000 milliLiter(s) (50 mL/Hr) IV Continuous <Continuous>  dextrose 50% Injectable 12.5 Gram(s) IV Push once  doxazosin 4 milliGRAM(s) Oral daily  furosemide    Tablet 20 milliGRAM(s) Oral daily  heparin  Injectable 7500 Unit(s) SubCutaneous every 8 hours  insulin lispro (HumaLOG) corrective regimen sliding scale   SubCutaneous three times a day before meals  insulin lispro (HumaLOG) corrective regimen sliding scale   SubCutaneous at bedtime  levothyroxine 25 MICROGram(s) Oral daily  metoprolol succinate ER 50 milliGRAM(s) Oral daily  nystatin Powder 1 Application(s) Topical two times a day    MEDICATIONS  (PRN):  acetaminophen   Tablet .. 650 milliGRAM(s) Oral every 6 hours PRN Mild Pain (1 - 3), Moderate Pain (4 - 6)  dextrose 40% Gel 15 Gram(s) Oral once PRN Blood Glucose LESS THAN 70 milliGRAM(s)/deciLiter  glucagon  Injectable 1 milliGRAM(s) IntraMuscular once PRN Glucose <70 milliGRAM(s)/deciLiter    Vital Signs Last 24 Hrs  T(C): 36.9 (28 Feb 2020 11:00), Max: 36.9 (27 Feb 2020 16:17)  T(F): 98.4 (28 Feb 2020 11:00), Max: 98.5 (27 Feb 2020 16:17)  HR: 72 (28 Feb 2020 11:00) (51 - 82)  BP: 113/70 (28 Feb 2020 11:00) (100/55 - 145/73)  BP(mean): --  RR: 19 (28 Feb 2020 11:00) (18 - 19)  SpO2: 93% (28 Feb 2020 11:00) (88% - 97%)      02-27 @ 07:01  -  02-28 @ 07:00  --------------------------------------------------------  IN: 600 mL / OUT: 2100 mL / NET: -1500 mL      LABS:                        9.7    8.37  )-----------( 179      ( 28 Feb 2020 10:52 )             34.1     02-28    140  |  100  |  60<H>  ----------------------------<  135<H>  4.9   |  32<H>  |  1.91<H>    Ca    9.3      28 Feb 2020 10:52  Phos  3.3     02-27  Mg     2.2     02-27    TPro  6.7  /  Alb  3.3  /  TBili  0.8  /  DBili  x   /  AST  10  /  ALT  12  /  AlkPhos  72  02-27    Physical Examination:    General: Non toxic, No acute distress.      HEENT: Pupils equal, reactive to light.  Symmetric.    PULM: Distant without wheeze or rhonchi    CVS: Regular rate and rhythm, no murmurs, rubs, or gallops    ABD: Soft, nondistended, nontender, normoactive bowel sounds, no masses    EXT: No edema, nontender    SKIN: 4+ LE edema    NEURO: Alert, oriented, interactive, nonfocal    RADIOLOGY REVIEWED PERSONALLY  CXR:    PROCEDURE DATE:  02/23/2020        INTERPRETATION:  CLINICAL INFORMATION: Shortness of breath    EXAM: AP view of the chest     COMPARISON: Radiograph dated  4/3/2016    FINDINGS:      Pulmonary edema. Bilateral pleural effusions.  There is no pneumothorax.  The cardiac silhouette size cannot be accurately assessed on this radiograph.  There is no acute abnormality of the visualized osseous structures.    IMPRESSION:    Pulmonary edema.Bilateral pleural effusions.      CT chest:    TTE:    PROCEDURE: Transthoracic echocardiogram with 2-D, M-Mode  and complete spectral and color flow Doppler. Verbal  consent was obtained for injection of echo contrast  following a discussion of risks and benefits. Following  intravenous injection of contrast, harmonic imaging was  performed.  INDICATION: Dyspnea, unspecified (R06.00)  ------------------------------------------------------------------------  Dimensions:    Normal Values:  LA:     3.8    2.0 - 4.0 cm  Ao:     2.9    2.0 - 3.8 cm  SEPTUM: 0.8    0.6 - 1.2 cm  PWT:    1.0    0.6 - 1.1 cm  LVIDd:  4.9    3.0 - 5.6 cm  LVIDs:  3.1    1.8 - 4.0 cm  Derived variables:  LVMI: 65 g/m2  RWT: 0.40  Fractional short: 37 %  EF (Teicholtz): 66 %  ------------------------------------------------------------------------  Observations:  Mitral Valve: Mitral annular calcification and calcified  mitral leaflets with normal diastolic opening. Mild mitral  regurgitation.  Aortic Valve/Aorta: Normal trileaflet aortic valve. No  aortic valve regurgitation seen.  Normal aortic root (Ao: 2.9 cm at the sinuses of Valsalva).  Left Atrium: Normal left atrium.  LA volume index = 24  cc/m2.  Left Ventricle: Normal left ventricular systolic function.  No segmental wall motion abnormalities. Endocardial  visualization enhanced with intravenous injection of echo  contrast (Definity). Normal left ventricular internal  dimensions and wall thicknesses. Normal diastolic function  Right Heart: Normal right atrium. The right ventricle is  not well visualized. Tricuspid valve not well visualized.  Pulmonic valve not well visualized.  Pericardium/Pleura: Thickened pericardium with a small <1cm  anterior pericardial effusion.  Hemodynamic: Estimated right atrial pressure is 8 mm Hg.  ------------------------------------------------------------------------  Conclusions:  Technically difficult study.  1. Normal leftventricular internal dimensions and wall  thicknesses.  2. Normal left ventricular systolic function. No segmental  wall motion abnormalities. Endocardial visualization  enhanced with intravenous injection of echo contrast  (Definity).  3. Normal diastolic function  4. The right ventricle is not well visualized.  5. Thickened pericardium with a small <1cm anterior  pericardial effusion.

## 2020-02-28 NOTE — PROGRESS NOTE ADULT - ASSESSMENT
73 y/o female PMHx FREDI, obesity hypoventilation syndrome, DM2, HLD, LE lymphedema, on CPAP at night recently noncompliant, presents with worsening SOB and LE edema.    # acute hypercapnic and hypoxic resp failure   sp intubation now extubation  appreciate pulm care  serial ABGs, NIV as needed  outpt pulmonologist Dr. Porter  doppler LE no DVT  fu pulm cs    # LANE  last creat 1.2 in 12/2019  cont to trend- trending down  strict i os    # HTN   on clonidine and toprol XL  switched to oral lasix  TTE nml systolic fxn  CXR pulm edema    # hypothyroidism  takes synthroid 25mcg qd per [facility] EMR    # DM2   hold oral meds  SSI  a1c 6.7    DVt ppx  PT eval- dc planning- rehab

## 2020-02-28 NOTE — PROGRESS NOTE ADULT - SUBJECTIVE AND OBJECTIVE BOX
Patient is a 74y old  Female who presents with a chief complaint of acute hypercarbic respiratory failure (28 Feb 2020 12:15)      INTERVAL HPI/OVERNIGHT EVENTS: noted, feels well      Vital Signs Last 24 Hrs  T(C): 37.1 (28 Feb 2020 13:06), Max: 37.1 (28 Feb 2020 13:06)  T(F): 98.7 (28 Feb 2020 13:06), Max: 98.7 (28 Feb 2020 13:06)  HR: 88 (28 Feb 2020 16:43) (72 - 88)  BP: 132/65 (28 Feb 2020 13:06) (104/59 - 132/65)  BP(mean): --  RR: 18 (28 Feb 2020 13:06) (18 - 19)  SpO2: 94% (28 Feb 2020 16:43) (88% - 97%)    acetaminophen   Tablet .. 650 milliGRAM(s) Oral every 6 hours PRN  allopurinol 300 milliGRAM(s) Oral daily  aspirin  chewable 81 milliGRAM(s) Oral daily  atorvastatin 10 milliGRAM(s) Oral at bedtime  bimatoprost 0.01% Ophthalmic Solution 1 Drop(s) Both EYES at bedtime  cloNIDine 0.2 milliGRAM(s) Oral daily  dextrose 40% Gel 15 Gram(s) Oral once PRN  dextrose 5%. 1000 milliLiter(s) IV Continuous <Continuous>  dextrose 50% Injectable 12.5 Gram(s) IV Push once  doxazosin 4 milliGRAM(s) Oral daily  furosemide    Tablet 20 milliGRAM(s) Oral daily  glucagon  Injectable 1 milliGRAM(s) IntraMuscular once PRN  heparin  Injectable 7500 Unit(s) SubCutaneous every 8 hours  insulin lispro (HumaLOG) corrective regimen sliding scale   SubCutaneous three times a day before meals  insulin lispro (HumaLOG) corrective regimen sliding scale   SubCutaneous at bedtime  levothyroxine 25 MICROGram(s) Oral daily  metoprolol succinate ER 50 milliGRAM(s) Oral daily  nystatin Powder 1 Application(s) Topical two times a day      PHYSICAL EXAM:  GENERAL: NAD,   EYES: conjunctiva and sclera clear  ENMT: Moist mucous membranes  NECK: Supple, No JVD, Normal thyroid  NERVOUS SYSTEM:  Alert & Oriented X,   CHEST/LUNG: Clear to auscultation bilaterally; No rales, rhonchi, wheezing, or rubs  HEART: Regular rate and rhythm; No murmurs, rubs, or gallops  ABDOMEN: Soft, Nontender, Nondistended; Bowel sounds present  EXTREMITIES:  2+ Peripheral Pulses, No clubbing, cyanosis, or edema  LYMPH: No lymphadenopathy noted  SKIN: No rashes or lesions    Consultant(s) Notes Reviewed:  [x ] YES  [ ] NO  Care Discussed with Consultants/Other Providers [ x] YES  [ ] NO    LABS:                        9.7    8.37  )-----------( 179      ( 28 Feb 2020 10:52 )             34.1     02-28    140  |  100  |  60<H>  ----------------------------<  135<H>  4.9   |  32<H>  |  1.91<H>    Ca    9.3      28 Feb 2020 10:52  Phos  3.3     02-27  Mg     2.2     02-27    TPro  6.7  /  Alb  3.3  /  TBili  0.8  /  DBili  x   /  AST  10  /  ALT  12  /  AlkPhos  72  02-27        CAPILLARY BLOOD GLUCOSE      POCT Blood Glucose.: 128 mg/dL (28 Feb 2020 17:13)  POCT Blood Glucose.: 123 mg/dL (28 Feb 2020 12:47)  POCT Blood Glucose.: 128 mg/dL (28 Feb 2020 08:22)  POCT Blood Glucose.: 132 mg/dL (27 Feb 2020 21:39)      ABG - ( 28 Feb 2020 13:17 )  pH, Arterial: 7.31  pH, Blood: x     /  pCO2: 76    /  pO2: 89    / HCO3: 37    / Base Excess: 8.8   /  SaO2: 97                      RADIOLOGY & ADDITIONAL TESTS:    Imaging Personally Reviewed:  [x ] YES  [ ] NO

## 2020-02-29 LAB
ANION GAP SERPL CALC-SCNC: 12 MMOL/L — SIGNIFICANT CHANGE UP (ref 5–17)
BUN SERPL-MCNC: 64 MG/DL — HIGH (ref 7–23)
CALCIUM SERPL-MCNC: 9.2 MG/DL — SIGNIFICANT CHANGE UP (ref 8.4–10.5)
CHLORIDE SERPL-SCNC: 99 MMOL/L — SIGNIFICANT CHANGE UP (ref 96–108)
CO2 SERPL-SCNC: 31 MMOL/L — SIGNIFICANT CHANGE UP (ref 22–31)
CREAT SERPL-MCNC: 1.84 MG/DL — HIGH (ref 0.5–1.3)
GLUCOSE BLDC GLUCOMTR-MCNC: 117 MG/DL — HIGH (ref 70–99)
GLUCOSE BLDC GLUCOMTR-MCNC: 120 MG/DL — HIGH (ref 70–99)
GLUCOSE BLDC GLUCOMTR-MCNC: 128 MG/DL — HIGH (ref 70–99)
GLUCOSE BLDC GLUCOMTR-MCNC: 170 MG/DL — HIGH (ref 70–99)
GLUCOSE SERPL-MCNC: 115 MG/DL — HIGH (ref 70–99)
POTASSIUM SERPL-MCNC: 5.2 MMOL/L — SIGNIFICANT CHANGE UP (ref 3.5–5.3)
POTASSIUM SERPL-SCNC: 5.2 MMOL/L — SIGNIFICANT CHANGE UP (ref 3.5–5.3)
SODIUM SERPL-SCNC: 142 MMOL/L — SIGNIFICANT CHANGE UP (ref 135–145)

## 2020-02-29 RX ADMIN — Medication 50 MILLIGRAM(S): at 21:23

## 2020-02-29 RX ADMIN — Medication 0.2 MILLIGRAM(S): at 21:23

## 2020-02-29 RX ADMIN — Medication 81 MILLIGRAM(S): at 12:07

## 2020-02-29 RX ADMIN — NYSTATIN CREAM 1 APPLICATION(S): 100000 CREAM TOPICAL at 18:41

## 2020-02-29 RX ADMIN — Medication 4 MILLIGRAM(S): at 06:17

## 2020-02-29 RX ADMIN — Medication 300 MILLIGRAM(S): at 12:07

## 2020-02-29 RX ADMIN — HEPARIN SODIUM 7500 UNIT(S): 5000 INJECTION INTRAVENOUS; SUBCUTANEOUS at 14:34

## 2020-02-29 RX ADMIN — NYSTATIN CREAM 1 APPLICATION(S): 100000 CREAM TOPICAL at 06:17

## 2020-02-29 RX ADMIN — Medication 20 MILLIGRAM(S): at 06:17

## 2020-02-29 RX ADMIN — HEPARIN SODIUM 7500 UNIT(S): 5000 INJECTION INTRAVENOUS; SUBCUTANEOUS at 21:24

## 2020-02-29 RX ADMIN — Medication 25 MICROGRAM(S): at 06:17

## 2020-02-29 RX ADMIN — HEPARIN SODIUM 7500 UNIT(S): 5000 INJECTION INTRAVENOUS; SUBCUTANEOUS at 06:17

## 2020-02-29 RX ADMIN — ATORVASTATIN CALCIUM 10 MILLIGRAM(S): 80 TABLET, FILM COATED ORAL at 21:23

## 2020-02-29 RX ADMIN — BIMATOPROST 1 DROP(S): 0.3 SOLUTION/ DROPS OPHTHALMIC at 21:27

## 2020-02-29 RX ADMIN — Medication 1: at 12:09

## 2020-02-29 NOTE — PROGRESS NOTE ADULT - SUBJECTIVE AND OBJECTIVE BOX
Patient is a 74y old  Female who presents with a chief complaint of acute hypercarbic respiratory failure (28 Feb 2020 18:44)      INTERVAL HPI/OVERNIGHT EVENTS: noted, feels well      Vital Signs Last 24 Hrs  T(C): 36.8 (29 Feb 2020 20:24), Max: 36.9 (29 Feb 2020 16:20)  T(F): 98.3 (29 Feb 2020 20:24), Max: 98.4 (29 Feb 2020 16:20)  HR: 76 (29 Feb 2020 20:24) (69 - 77)  BP: 131/61 (29 Feb 2020 20:24) (112/61 - 137/57)  BP(mean): --  RR: 18 (29 Feb 2020 20:24) (18 - 19)  SpO2: 92% (29 Feb 2020 20:24) (92% - 98%)    acetaminophen   Tablet .. 650 milliGRAM(s) Oral every 6 hours PRN  allopurinol 300 milliGRAM(s) Oral daily  aspirin  chewable 81 milliGRAM(s) Oral daily  atorvastatin 10 milliGRAM(s) Oral at bedtime  bimatoprost 0.01% Ophthalmic Solution 1 Drop(s) Both EYES at bedtime  cloNIDine 0.2 milliGRAM(s) Oral daily  dextrose 40% Gel 15 Gram(s) Oral once PRN  dextrose 5%. 1000 milliLiter(s) IV Continuous <Continuous>  dextrose 50% Injectable 12.5 Gram(s) IV Push once  doxazosin 4 milliGRAM(s) Oral daily  furosemide    Tablet 20 milliGRAM(s) Oral daily  glucagon  Injectable 1 milliGRAM(s) IntraMuscular once PRN  heparin  Injectable 7500 Unit(s) SubCutaneous every 8 hours  insulin lispro (HumaLOG) corrective regimen sliding scale   SubCutaneous three times a day before meals  insulin lispro (HumaLOG) corrective regimen sliding scale   SubCutaneous at bedtime  levothyroxine 25 MICROGram(s) Oral daily  metoprolol succinate ER 50 milliGRAM(s) Oral daily  nystatin Powder 1 Application(s) Topical two times a day      PHYSICAL EXAM:  GENERAL: NAD,   EYES: conjunctiva and sclera clear  ENMT: Moist mucous membranes  NECK: Supple, No JVD, Normal thyroid  NERVOUS SYSTEM:  Alert & Oriented X,   CHEST/LUNG: Clear to auscultation bilaterally; No rales, rhonchi, wheezing, or rubs  HEART: Regular rate and rhythm; No murmurs, rubs, or gallops  ABDOMEN: Soft, Nontender, Nondistended; Bowel sounds present  EXTREMITIES:  2+ Peripheral Pulses, No clubbing, cyanosis, or edema  LYMPH: No lymphadenopathy noted  SKIN: No rashes or lesions    Consultant(s) Notes Reviewed:  [x ] YES  [ ] NO  Care Discussed with Consultants/Other Providers [ x] YES  [ ] NO    LABS:                        9.7    8.37  )-----------( 179      ( 28 Feb 2020 10:52 )             34.1     02-29    142  |  99  |  64<H>  ----------------------------<  115<H>  5.2   |  31  |  1.84<H>    Ca    9.2      29 Feb 2020 06:21          CAPILLARY BLOOD GLUCOSE      POCT Blood Glucose.: 120 mg/dL (29 Feb 2020 21:44)  POCT Blood Glucose.: 117 mg/dL (29 Feb 2020 16:52)  POCT Blood Glucose.: 170 mg/dL (29 Feb 2020 12:08)  POCT Blood Glucose.: 128 mg/dL (29 Feb 2020 08:46)      ABG - ( 28 Feb 2020 13:17 )  pH, Arterial: 7.31  pH, Blood: x     /  pCO2: 76    /  pO2: 89    / HCO3: 37    / Base Excess: 8.8   /  SaO2: 97                      RADIOLOGY & ADDITIONAL TESTS:    Imaging Personally Reviewed:  [x ] YES  [ ] NO

## 2020-02-29 NOTE — PROGRESS NOTE ADULT - ASSESSMENT
73 y/o female PMHx FREDI, obesity hypoventilation syndrome, DM2, HLD, LE lymphedema, on CPAP at night recently noncompliant, presents with worsening SOB and LE edema.    # acute hypercapnic and hypoxic resp failure   sp intubation now extubation  appreciate pulm care  serial ABGs, NIV as needed  outpt pulmonologist Dr. Porter  doppler LE no DVT  fu pulm cs    # LANE  last creat 1.2 in 12/2019  cont to trend  strict i os    # HTN   on clonidine and toprol XL  switched to oral lasix  TTE nml systolic fxn  CXR pulm edema    # hypothyroidism  takes synthroid 25mcg qd per [facility] EMR    # DM2   hold oral meds  SSI  a1c 6.7    DVt ppx  PT eval- dc planning- rehab

## 2020-03-01 LAB
ANION GAP SERPL CALC-SCNC: 13 MMOL/L — SIGNIFICANT CHANGE UP (ref 5–17)
BUN SERPL-MCNC: 64 MG/DL — HIGH (ref 7–23)
CALCIUM SERPL-MCNC: 9.6 MG/DL — SIGNIFICANT CHANGE UP (ref 8.4–10.5)
CHLORIDE SERPL-SCNC: 97 MMOL/L — SIGNIFICANT CHANGE UP (ref 96–108)
CO2 SERPL-SCNC: 32 MMOL/L — HIGH (ref 22–31)
CREAT SERPL-MCNC: 1.76 MG/DL — HIGH (ref 0.5–1.3)
GLUCOSE BLDC GLUCOMTR-MCNC: 106 MG/DL — HIGH (ref 70–99)
GLUCOSE BLDC GLUCOMTR-MCNC: 139 MG/DL — HIGH (ref 70–99)
GLUCOSE BLDC GLUCOMTR-MCNC: 140 MG/DL — HIGH (ref 70–99)
GLUCOSE BLDC GLUCOMTR-MCNC: 151 MG/DL — HIGH (ref 70–99)
GLUCOSE SERPL-MCNC: 117 MG/DL — HIGH (ref 70–99)
POTASSIUM SERPL-MCNC: 4.6 MMOL/L — SIGNIFICANT CHANGE UP (ref 3.5–5.3)
POTASSIUM SERPL-SCNC: 4.6 MMOL/L — SIGNIFICANT CHANGE UP (ref 3.5–5.3)
SODIUM SERPL-SCNC: 142 MMOL/L — SIGNIFICANT CHANGE UP (ref 135–145)

## 2020-03-01 RX ADMIN — Medication 300 MILLIGRAM(S): at 12:03

## 2020-03-01 RX ADMIN — NYSTATIN CREAM 1 APPLICATION(S): 100000 CREAM TOPICAL at 05:31

## 2020-03-01 RX ADMIN — Medication 0.2 MILLIGRAM(S): at 23:22

## 2020-03-01 RX ADMIN — Medication 25 MICROGRAM(S): at 05:31

## 2020-03-01 RX ADMIN — HEPARIN SODIUM 7500 UNIT(S): 5000 INJECTION INTRAVENOUS; SUBCUTANEOUS at 05:31

## 2020-03-01 RX ADMIN — Medication 81 MILLIGRAM(S): at 12:03

## 2020-03-01 RX ADMIN — Medication 1: at 08:52

## 2020-03-01 RX ADMIN — ATORVASTATIN CALCIUM 10 MILLIGRAM(S): 80 TABLET, FILM COATED ORAL at 23:22

## 2020-03-01 RX ADMIN — Medication 4 MILLIGRAM(S): at 05:31

## 2020-03-01 RX ADMIN — HEPARIN SODIUM 7500 UNIT(S): 5000 INJECTION INTRAVENOUS; SUBCUTANEOUS at 23:21

## 2020-03-01 RX ADMIN — BIMATOPROST 1 DROP(S): 0.3 SOLUTION/ DROPS OPHTHALMIC at 23:23

## 2020-03-01 RX ADMIN — Medication 20 MILLIGRAM(S): at 05:31

## 2020-03-01 RX ADMIN — HEPARIN SODIUM 7500 UNIT(S): 5000 INJECTION INTRAVENOUS; SUBCUTANEOUS at 13:05

## 2020-03-01 RX ADMIN — NYSTATIN CREAM 1 APPLICATION(S): 100000 CREAM TOPICAL at 17:19

## 2020-03-01 RX ADMIN — Medication 50 MILLIGRAM(S): at 23:22

## 2020-03-01 NOTE — PROGRESS NOTE ADULT - ASSESSMENT
75 y/o female PMHx FREDI, obesity hypoventilation syndrome, DM2, HLD, LE lymphedema, on CPAP at night recently noncompliant, presents with worsening SOB and LE edema.    # acute hypercapnic and hypoxic resp failure   sp intubation now extubation  appreciate pulm care  serial ABGs, NIV as needed  outpt pulmonologist Dr. Porter  doppler LE no DVT  fu pulm cs    # LANE  last creat 1.2 in 12/2019  cont to trend  strict i os    # HTN   on clonidine and toprol XL  switched to oral lasix  TTE nml systolic fxn  CXR pulm edema    # hypothyroidism  takes synthroid 25mcg qd per [facility] EMR    # DM2   hold oral meds  SSI  a1c 6.7    DVt ppx  PT eval- dc planning- rehab

## 2020-03-01 NOTE — PROGRESS NOTE ADULT - SUBJECTIVE AND OBJECTIVE BOX
Patient is a 74y old  Female who presents with a chief complaint of acute hypercarbic respiratory failure (29 Feb 2020 13:21)      INTERVAL HPI/OVERNIGHT EVENTS: noted  feels well      Vital Signs Last 24 Hrs  T(C): 36.1 (01 Mar 2020 11:50), Max: 36.8 (29 Feb 2020 20:24)  T(F): 97 (01 Mar 2020 11:50), Max: 98.3 (29 Feb 2020 20:24)  HR: 77 (01 Mar 2020 11:50) (76 - 81)  BP: 109/63 (01 Mar 2020 11:50) (109/63 - 131/61)  BP(mean): --  RR: 18 (01 Mar 2020 11:50) (18 - 19)  SpO2: 93% (01 Mar 2020 11:50) (92% - 96%)    acetaminophen   Tablet .. 650 milliGRAM(s) Oral every 6 hours PRN  allopurinol 300 milliGRAM(s) Oral daily  aspirin  chewable 81 milliGRAM(s) Oral daily  atorvastatin 10 milliGRAM(s) Oral at bedtime  bimatoprost 0.01% Ophthalmic Solution 1 Drop(s) Both EYES at bedtime  cloNIDine 0.2 milliGRAM(s) Oral daily  dextrose 40% Gel 15 Gram(s) Oral once PRN  dextrose 5%. 1000 milliLiter(s) IV Continuous <Continuous>  dextrose 50% Injectable 12.5 Gram(s) IV Push once  doxazosin 4 milliGRAM(s) Oral daily  furosemide    Tablet 20 milliGRAM(s) Oral daily  glucagon  Injectable 1 milliGRAM(s) IntraMuscular once PRN  heparin  Injectable 7500 Unit(s) SubCutaneous every 8 hours  insulin lispro (HumaLOG) corrective regimen sliding scale   SubCutaneous three times a day before meals  insulin lispro (HumaLOG) corrective regimen sliding scale   SubCutaneous at bedtime  levothyroxine 25 MICROGram(s) Oral daily  metoprolol succinate ER 50 milliGRAM(s) Oral daily  nystatin Powder 1 Application(s) Topical two times a day      PHYSICAL EXAM:  GENERAL: NAD,   EYES: conjunctiva and sclera clear  ENMT: Moist mucous membranes  NECK: Supple, No JVD, Normal thyroid  NERVOUS SYSTEM:  Alert & Oriented X,   CHEST/LUNG: Clear to auscultation bilaterally; No rales, rhonchi, wheezing, or rubs  HEART: Regular rate and rhythm; No murmurs, rubs, or gallops  ABDOMEN: Soft, Nontender, Nondistended; Bowel sounds present  EXTREMITIES:  2+ Peripheral Pulses, No clubbing, cyanosis, or edema  LYMPH: No lymphadenopathy noted  SKIN: No rashes or lesions    Consultant(s) Notes Reviewed:  [x ] YES  [ ] NO  Care Discussed with Consultants/Other Providers [ x] YES  [ ] NO    LABS:    03-01    142  |  97  |  64<H>  ----------------------------<  117<H>  4.6   |  32<H>  |  1.76<H>    Ca    9.6      01 Mar 2020 07:22          CAPILLARY BLOOD GLUCOSE      POCT Blood Glucose.: 106 mg/dL (01 Mar 2020 12:05)  POCT Blood Glucose.: 151 mg/dL (01 Mar 2020 08:43)  POCT Blood Glucose.: 120 mg/dL (29 Feb 2020 21:44)  POCT Blood Glucose.: 117 mg/dL (29 Feb 2020 16:52)              RADIOLOGY & ADDITIONAL TESTS:    Imaging Personally Reviewed:  [x ] YES  [ ] NO

## 2020-03-01 NOTE — PROGRESS NOTE ADULT - ATTENDING COMMENTS
[facility]  [phone]
[facility]  [phone]
[facility]  [phone]
[facility]  [phone]
1. Acute on chronic hypercapnic respiratory failure resolved after intubation. Pt woke up after about 40 min on mechanical ventilation. Pt has not been using home CPAP or taking Lasix for the last 5 days prior to admission. Pt weaned and extubated this am. Looks a lot more comfortable today. Awake an oriented x3. Continue on AVAPS nightly.  and monitor PCO2. Family to bring in home CPAP.
1. Acute on chronic hypercapnic respiratory failure resolved after intubation. Pt tolerating extubation. Now has home CPAP with nasal prongs to use at night. Continue lasix.  2. LANE from hypotension ATN.  follow uo .   3. HTN: Continue current  medical regimen.

## 2020-03-02 ENCOUNTER — TRANSCRIPTION ENCOUNTER (OUTPATIENT)
Age: 75
End: 2020-03-02

## 2020-03-02 VITALS
HEART RATE: 69 BPM | DIASTOLIC BLOOD PRESSURE: 74 MMHG | TEMPERATURE: 98 F | RESPIRATION RATE: 19 BRPM | SYSTOLIC BLOOD PRESSURE: 142 MMHG | OXYGEN SATURATION: 97 %

## 2020-03-02 LAB
ANION GAP SERPL CALC-SCNC: 11 MMOL/L — SIGNIFICANT CHANGE UP (ref 5–17)
BASE EXCESS BLDA CALC-SCNC: 8.3 MMOL/L — HIGH (ref -2–2)
BUN SERPL-MCNC: 62 MG/DL — HIGH (ref 7–23)
CALCIUM SERPL-MCNC: 9.4 MG/DL — SIGNIFICANT CHANGE UP (ref 8.4–10.5)
CHLORIDE SERPL-SCNC: 99 MMOL/L — SIGNIFICANT CHANGE UP (ref 96–108)
CO2 BLDA-SCNC: 40 MMOL/L — HIGH (ref 22–30)
CO2 SERPL-SCNC: 32 MMOL/L — HIGH (ref 22–31)
CREAT SERPL-MCNC: 1.61 MG/DL — HIGH (ref 0.5–1.3)
GAS PNL BLDA: SIGNIFICANT CHANGE UP
GLUCOSE BLDC GLUCOMTR-MCNC: 104 MG/DL — HIGH (ref 70–99)
GLUCOSE BLDC GLUCOMTR-MCNC: 129 MG/DL — HIGH (ref 70–99)
GLUCOSE SERPL-MCNC: 106 MG/DL — HIGH (ref 70–99)
HCO3 BLDA-SCNC: 37 MMOL/L — HIGH (ref 21–29)
PCO2 BLDA: 84 MMHG — CRITICAL HIGH (ref 32–46)
PH BLDA: 7.27 — LOW (ref 7.35–7.45)
PO2 BLDA: 79 MMHG — SIGNIFICANT CHANGE UP (ref 74–108)
POTASSIUM SERPL-MCNC: 4.7 MMOL/L — SIGNIFICANT CHANGE UP (ref 3.5–5.3)
POTASSIUM SERPL-SCNC: 4.7 MMOL/L — SIGNIFICANT CHANGE UP (ref 3.5–5.3)
SAO2 % BLDA: 95 % — SIGNIFICANT CHANGE UP (ref 92–96)
SODIUM SERPL-SCNC: 142 MMOL/L — SIGNIFICANT CHANGE UP (ref 135–145)

## 2020-03-02 PROCEDURE — 36600 WITHDRAWAL OF ARTERIAL BLOOD: CPT

## 2020-03-02 PROCEDURE — 97116 GAIT TRAINING THERAPY: CPT

## 2020-03-02 PROCEDURE — 85027 COMPLETE CBC AUTOMATED: CPT

## 2020-03-02 PROCEDURE — 97530 THERAPEUTIC ACTIVITIES: CPT

## 2020-03-02 PROCEDURE — 82435 ASSAY OF BLOOD CHLORIDE: CPT

## 2020-03-02 PROCEDURE — 93970 EXTREMITY STUDY: CPT

## 2020-03-02 PROCEDURE — 80048 BASIC METABOLIC PNL TOTAL CA: CPT

## 2020-03-02 PROCEDURE — 82803 BLOOD GASES ANY COMBINATION: CPT

## 2020-03-02 PROCEDURE — 97110 THERAPEUTIC EXERCISES: CPT

## 2020-03-02 PROCEDURE — 96376 TX/PRO/DX INJ SAME DRUG ADON: CPT | Mod: XU

## 2020-03-02 PROCEDURE — 82947 ASSAY GLUCOSE BLOOD QUANT: CPT

## 2020-03-02 PROCEDURE — 86803 HEPATITIS C AB TEST: CPT

## 2020-03-02 PROCEDURE — 84295 ASSAY OF SERUM SODIUM: CPT

## 2020-03-02 PROCEDURE — 85014 HEMATOCRIT: CPT

## 2020-03-02 PROCEDURE — 82962 GLUCOSE BLOOD TEST: CPT

## 2020-03-02 PROCEDURE — 71045 X-RAY EXAM CHEST 1 VIEW: CPT

## 2020-03-02 PROCEDURE — 82330 ASSAY OF CALCIUM: CPT

## 2020-03-02 PROCEDURE — 99238 HOSP IP/OBS DSCHRG MGMT 30/<: CPT

## 2020-03-02 PROCEDURE — 84100 ASSAY OF PHOSPHORUS: CPT

## 2020-03-02 PROCEDURE — 94660 CPAP INITIATION&MGMT: CPT

## 2020-03-02 PROCEDURE — 83735 ASSAY OF MAGNESIUM: CPT

## 2020-03-02 PROCEDURE — 99291 CRITICAL CARE FIRST HOUR: CPT | Mod: 25

## 2020-03-02 PROCEDURE — 96375 TX/PRO/DX INJ NEW DRUG ADDON: CPT | Mod: XU

## 2020-03-02 PROCEDURE — 96374 THER/PROPH/DIAG INJ IV PUSH: CPT | Mod: XU

## 2020-03-02 PROCEDURE — 97162 PT EVAL MOD COMPLEX 30 MIN: CPT

## 2020-03-02 PROCEDURE — 83036 HEMOGLOBIN GLYCOSYLATED A1C: CPT

## 2020-03-02 PROCEDURE — 83605 ASSAY OF LACTIC ACID: CPT

## 2020-03-02 PROCEDURE — 83880 ASSAY OF NATRIURETIC PEPTIDE: CPT

## 2020-03-02 PROCEDURE — 85730 THROMBOPLASTIN TIME PARTIAL: CPT

## 2020-03-02 PROCEDURE — 84132 ASSAY OF SERUM POTASSIUM: CPT

## 2020-03-02 PROCEDURE — C8929: CPT

## 2020-03-02 PROCEDURE — 84484 ASSAY OF TROPONIN QUANT: CPT

## 2020-03-02 PROCEDURE — 94002 VENT MGMT INPAT INIT DAY: CPT

## 2020-03-02 PROCEDURE — 85610 PROTHROMBIN TIME: CPT

## 2020-03-02 PROCEDURE — 80053 COMPREHEN METABOLIC PANEL: CPT

## 2020-03-02 PROCEDURE — 51702 INSERT TEMP BLADDER CATH: CPT

## 2020-03-02 PROCEDURE — 94640 AIRWAY INHALATION TREATMENT: CPT | Mod: XU

## 2020-03-02 RX ORDER — ACETAMINOPHEN 500 MG
2 TABLET ORAL
Qty: 0 | Refills: 0 | DISCHARGE
Start: 2020-03-02

## 2020-03-02 RX ORDER — NYSTATIN CREAM 100000 [USP'U]/G
1 CREAM TOPICAL
Qty: 0 | Refills: 0 | DISCHARGE
Start: 2020-03-02

## 2020-03-02 RX ADMIN — HEPARIN SODIUM 7500 UNIT(S): 5000 INJECTION INTRAVENOUS; SUBCUTANEOUS at 13:05

## 2020-03-02 RX ADMIN — HEPARIN SODIUM 7500 UNIT(S): 5000 INJECTION INTRAVENOUS; SUBCUTANEOUS at 05:18

## 2020-03-02 RX ADMIN — Medication 4 MILLIGRAM(S): at 05:17

## 2020-03-02 RX ADMIN — Medication 81 MILLIGRAM(S): at 12:37

## 2020-03-02 RX ADMIN — NYSTATIN CREAM 1 APPLICATION(S): 100000 CREAM TOPICAL at 05:18

## 2020-03-02 RX ADMIN — Medication 300 MILLIGRAM(S): at 12:37

## 2020-03-02 RX ADMIN — Medication 25 MICROGRAM(S): at 05:20

## 2020-03-02 RX ADMIN — Medication 20 MILLIGRAM(S): at 05:17

## 2020-03-02 NOTE — CHART NOTE - NSCHARTNOTEFT_GEN_A_CORE
Admit with hypoxic/ hypercapnic resp failure. Hx significant for FREDI on CPAP, and hypoventilation syndrome.    Patient for dc to [facility] today; followed by Pulmonary.    ABG results today as follow:    ABG - ( 02 Mar 2020 15:24 )  pH, Arterial: 7.27  pH, Blood: x     /  pCO2: 84    /  pO2: 79    / HCO3: 37    / Base Excess: 8.3   /  SaO2: 95        Results d/w Pulmonary, patient cleared for d/c as results are consistent with patient's baseline. Clinically she is asymptomatic at this time.    JIAN Tate  [phone]

## 2020-03-02 NOTE — DISCHARGE NOTE NURSING/CASE MANAGEMENT/SOCIAL WORK - NSDCPEEMAIL_GEN_ALL_CORE
[facility] for Tobacco Control email [email or web address]

## 2020-03-02 NOTE — DISCHARGE NOTE NURSING/CASE MANAGEMENT/SOCIAL WORK - PATIENT PORTAL LINK FT
You can access the FollowMyHealth Patient Portal offered by [facility] by registering at the following website: [email or web address]. By joining [email or web address]’s FollowMyHealth portal, you will also be able to view your health information using other applications (apps) compatible with our system.

## 2020-03-02 NOTE — PROGRESS NOTE ADULT - REASON FOR ADMISSION
acute hypercarbic respiratory failure

## 2020-03-02 NOTE — PROGRESS NOTE ADULT - SUBJECTIVE AND OBJECTIVE BOX
Follow-up Pulm Progress Note  Bharat Castellon MD  [phone]    Stqble cardio-respiratory status  OOB in chair: ambulated with PT  Afebrile  Creatinine 1.6    Medications:  Vital Signs Last 24 Hrs  T(C): 36.8 (02 Mar 2020 08:53), Max: 36.9 (02 Mar 2020 05:12)  T(F): 98.3 (02 Mar 2020 08:53), Max: 98.4 (02 Mar 2020 05:12)  HR: 82 (02 Mar 2020 08:53) (72 - 82)  BP: 139/72 (02 Mar 2020 08:53) (109/63 - 141/69)  BP(mean): --  RR: 18 (02 Mar 2020 08:53) (18 - 19)  SpO2: 97% (02 Mar 2020 08:53) (93% - 97%)      03-01 @ 07:01  -  03-02 @ 07:00  --------------------------------------------------------  IN: 1280 mL / OUT: 1900 mL / NET: -620 mL        LABS:    03-02    142  |  99  |  62<H>  ----------------------------<  106<H>  4.7   |  32<H>  |  1.61<H>    Ca    9.4      02 Mar 2020 06:45      Physical Examination:  PULM: No wheeze or rhonchi  CVS: Regular rate and rhythm, no murmurs, rubs, or gallops  ABD: Soft, non-tender  EXT:  No clubbing, cyanosis, or edema    RADIOLOGY REVIEWED  CXR:    CT chest:    TTE:

## 2020-03-02 NOTE — PROGRESS NOTE ADULT - ASSESSMENT
Acute on chronic hypercapneic repsiratory failure  Chronic lymphedema  CKD  Morbid obesity with obeisty hypovnetilation syndrome    REC    Clear for DC today to [facility]  Pre DC ABG ordered  Continue home CPAP

## 2020-03-02 NOTE — PROGRESS NOTE ADULT - PROVIDER SPECIALTY LIST ADULT
Internal Medicine
MICU
MICU
Pulmonology
Internal Medicine

## 2020-07-01 ENCOUNTER — INPATIENT (INPATIENT)
Facility: HOSPITAL | Age: 75
LOS: 12 days | Discharge: HOME | DRG: 291 | End: 2020-07-14
Attending: INTERNAL MEDICINE | Admitting: INTERNAL MEDICINE
Payer: MEDICARE

## 2020-07-01 VITALS
HEART RATE: 90 BPM | RESPIRATION RATE: 20 BRPM | OXYGEN SATURATION: 92 % | DIASTOLIC BLOOD PRESSURE: 90 MMHG | SYSTOLIC BLOOD PRESSURE: 165 MMHG | TEMPERATURE: 98 F | WEIGHT: 293 LBS | HEIGHT: 62 IN

## 2020-07-01 DIAGNOSIS — D64.9 ANEMIA, UNSPECIFIED: ICD-10-CM

## 2020-07-01 DIAGNOSIS — Z90.710 ACQUIRED ABSENCE OF BOTH CERVIX AND UTERUS: Chronic | ICD-10-CM

## 2020-07-01 DIAGNOSIS — Z98.89 OTHER SPECIFIED POSTPROCEDURAL STATES: Chronic | ICD-10-CM

## 2020-07-01 LAB
ALBUMIN SERPL ELPH-MCNC: 3.6 G/DL — SIGNIFICANT CHANGE UP (ref 3.3–5)
ALP SERPL-CCNC: 78 U/L — SIGNIFICANT CHANGE UP (ref 40–120)
ALT FLD-CCNC: 12 U/L — SIGNIFICANT CHANGE UP (ref 10–45)
ANION GAP SERPL CALC-SCNC: 10 MMOL/L — SIGNIFICANT CHANGE UP (ref 5–17)
ANISOCYTOSIS BLD QL: SIGNIFICANT CHANGE UP
APTT BLD: 32.5 SEC — SIGNIFICANT CHANGE UP (ref 27.5–35.5)
AST SERPL-CCNC: 9 U/L — LOW (ref 10–40)
BASE EXCESS BLDV CALC-SCNC: 5 MMOL/L — HIGH (ref -2–2)
BASO STIPL BLD QL SMEAR: PRESENT — SIGNIFICANT CHANGE UP
BASOPHILS # BLD AUTO: 0.08 K/UL — SIGNIFICANT CHANGE UP (ref 0–0.2)
BASOPHILS NFR BLD AUTO: 0.9 % — SIGNIFICANT CHANGE UP (ref 0–2)
BILIRUB SERPL-MCNC: 0.5 MG/DL — SIGNIFICANT CHANGE UP (ref 0.2–1.2)
BLD GP AB SCN SERPL QL: NEGATIVE — SIGNIFICANT CHANGE UP
BUN SERPL-MCNC: 40 MG/DL — HIGH (ref 7–23)
CA-I SERPL-SCNC: 1.34 MMOL/L — HIGH (ref 1.12–1.3)
CALCIUM SERPL-MCNC: 9.7 MG/DL — SIGNIFICANT CHANGE UP (ref 8.4–10.5)
CHLORIDE BLDV-SCNC: 109 MMOL/L — HIGH (ref 96–108)
CHLORIDE SERPL-SCNC: 107 MMOL/L — SIGNIFICANT CHANGE UP (ref 96–108)
CO2 BLDV-SCNC: 36 MMOL/L — HIGH (ref 22–30)
CO2 SERPL-SCNC: 29 MMOL/L — SIGNIFICANT CHANGE UP (ref 22–31)
CREAT SERPL-MCNC: 1.26 MG/DL — SIGNIFICANT CHANGE UP (ref 0.5–1.3)
DACRYOCYTES BLD QL SMEAR: SLIGHT — SIGNIFICANT CHANGE UP
EOSINOPHIL # BLD AUTO: 1.14 K/UL — HIGH (ref 0–0.5)
EOSINOPHIL NFR BLD AUTO: 13 % — HIGH (ref 0–6)
GAS PNL BLDV: 147 MMOL/L — HIGH (ref 135–145)
GAS PNL BLDV: SIGNIFICANT CHANGE UP
GLUCOSE BLDV-MCNC: 137 MG/DL — HIGH (ref 70–99)
GLUCOSE SERPL-MCNC: 139 MG/DL — HIGH (ref 70–99)
HCO3 BLDV-SCNC: 34 MMOL/L — HIGH (ref 21–29)
HCT VFR BLD CALC: 26.1 % — LOW (ref 34.5–45)
HCT VFR BLDA CALC: 26 % — LOW (ref 39–50)
HGB BLD CALC-MCNC: 8.5 G/DL — LOW (ref 11.5–15.5)
HGB BLD-MCNC: 7.4 G/DL — LOW (ref 11.5–15.5)
HYPOCHROMIA BLD QL: SLIGHT — SIGNIFICANT CHANGE UP
INR BLD: 1.39 RATIO — HIGH (ref 0.88–1.16)
LACTATE BLDV-MCNC: 1.3 MMOL/L — SIGNIFICANT CHANGE UP (ref 0.7–2)
LYMPHOCYTES # BLD AUTO: 0.23 K/UL — LOW (ref 1–3.3)
LYMPHOCYTES # BLD AUTO: 2.6 % — LOW (ref 13–44)
MACROCYTES BLD QL: SIGNIFICANT CHANGE UP
MANUAL SMEAR VERIFICATION: SIGNIFICANT CHANGE UP
MCHC RBC-ENTMCNC: 28.4 GM/DL — LOW (ref 32–36)
MCHC RBC-ENTMCNC: 31.2 PG — SIGNIFICANT CHANGE UP (ref 27–34)
MCV RBC AUTO: 110.1 FL — HIGH (ref 80–100)
MICROCYTES BLD QL: SLIGHT — SIGNIFICANT CHANGE UP
MONOCYTES # BLD AUTO: 0.53 K/UL — SIGNIFICANT CHANGE UP (ref 0–0.9)
MONOCYTES NFR BLD AUTO: 6.1 % — SIGNIFICANT CHANGE UP (ref 2–14)
NEUTROPHILS # BLD AUTO: 6.69 K/UL — SIGNIFICANT CHANGE UP (ref 1.8–7.4)
NEUTROPHILS NFR BLD AUTO: 74.8 % — SIGNIFICANT CHANGE UP (ref 43–77)
NEUTS BAND # BLD: 1.7 % — SIGNIFICANT CHANGE UP (ref 0–8)
OB PNL STL: NEGATIVE — SIGNIFICANT CHANGE UP
OVALOCYTES BLD QL SMEAR: SLIGHT — SIGNIFICANT CHANGE UP
PCO2 BLDV: 85 MMHG — HIGH (ref 35–50)
PH BLDV: 7.22 — LOW (ref 7.35–7.45)
PLAT MORPH BLD: NORMAL — SIGNIFICANT CHANGE UP
PLATELET # BLD AUTO: 180 K/UL — SIGNIFICANT CHANGE UP (ref 150–400)
PO2 BLDV: 28 MMHG — SIGNIFICANT CHANGE UP (ref 25–45)
POIKILOCYTOSIS BLD QL AUTO: SLIGHT — SIGNIFICANT CHANGE UP
POLYCHROMASIA BLD QL SMEAR: SLIGHT — SIGNIFICANT CHANGE UP
POTASSIUM BLDV-SCNC: 4.2 MMOL/L — SIGNIFICANT CHANGE UP (ref 3.5–5.3)
POTASSIUM SERPL-MCNC: 4.3 MMOL/L — SIGNIFICANT CHANGE UP (ref 3.5–5.3)
POTASSIUM SERPL-SCNC: 4.3 MMOL/L — SIGNIFICANT CHANGE UP (ref 3.5–5.3)
PROT SERPL-MCNC: 7.2 G/DL — SIGNIFICANT CHANGE UP (ref 6–8.3)
PROTHROM AB SERPL-ACNC: 15.6 SEC — HIGH (ref 10.6–13.6)
RBC # BLD: 2.37 M/UL — LOW (ref 3.8–5.2)
RBC # FLD: 16.6 % — HIGH (ref 10.3–14.5)
RBC BLD AUTO: ABNORMAL
RH IG SCN BLD-IMP: POSITIVE — SIGNIFICANT CHANGE UP
SAO2 % BLDV: 42 % — LOW (ref 67–88)
SARS-COV-2 RNA SPEC QL NAA+PROBE: SIGNIFICANT CHANGE UP
SODIUM SERPL-SCNC: 146 MMOL/L — HIGH (ref 135–145)
VARIANT LYMPHS # BLD: 0.9 % — SIGNIFICANT CHANGE UP (ref 0–6)
WBC # BLD: 8.75 K/UL — SIGNIFICANT CHANGE UP (ref 3.8–10.5)
WBC # FLD AUTO: 8.75 K/UL — SIGNIFICANT CHANGE UP (ref 3.8–10.5)

## 2020-07-01 PROCEDURE — 93010 ELECTROCARDIOGRAM REPORT: CPT | Mod: GC

## 2020-07-01 PROCEDURE — 99291 CRITICAL CARE FIRST HOUR: CPT | Mod: CS,GC

## 2020-07-01 PROCEDURE — 71045 X-RAY EXAM CHEST 1 VIEW: CPT | Mod: 26

## 2020-07-01 RX ORDER — FUROSEMIDE 40 MG
40 TABLET ORAL ONCE
Refills: 0 | Status: COMPLETED | OUTPATIENT
Start: 2020-07-01 | End: 2020-07-01

## 2020-07-01 RX ADMIN — Medication 40 MILLIGRAM(S): at 21:42

## 2020-07-01 NOTE — ED PROVIDER NOTE - ATTENDING CONTRIBUTION TO CARE
Pt with one month of worsening dyspnea, worse with any effort, associated with increased edema of LE.  Noncomplaint with lasix at times.  Had outpatient blood work 3wks ago showing low Hgb and sent to hospital today for further evaluation.  Denies black stool.  On iron pills twice a day.  Exam: morbidly obese, anxious, lymphedema LE, coarse bs at base, tachycardic with flow murmur.

## 2020-07-01 NOTE — ED PROVIDER NOTE - PHYSICAL EXAMINATION
Morbidly obese patient with chronic lymphedema in no respiratory distress. Patient has bilateral lower extremity pitting edema with no signs of infection. Patient does exhibit mild anxiety about being in the hospital due to COVID.

## 2020-07-01 NOTE — ED ADULT NURSE NOTE - OBJECTIVE STATEMENT
Pt is a 73 y/o female BIBA pmhx of HTN, Obesity, HLD, T2DM & respiratory failure presents ot the ED complaining of low H & H. Pt had labs drawn and her doctor sent her here for low hemoglobin. Pt is on 3L nasal cannula at home, was intubated in February due to respiratory failure. She is alert & oriented x 4, able to follow commands but is anxious. She is currently tachypneic to the 30's, sats in the 70's on room air but now 94% on 6L nasal cannula. On cardiac monitor, pt tachy to the 110's, S1S2 heard. Abdomen soft & non distended. Pt uses a walker to ambulate at home. B/l lower +4 pitting edema/lymphedema. Unable to assess peripheral pulses due to edema. Skin color consistent with race. Skin clean, warm, dry & intact. She is a former smoker, quit 5 years ago. On cardiac monitor. Denies chest pain, n/v/d, fever, cough, dysuria. Pt is a 75 y/o female BIBA pmhx of HTN, Obesity, HLD, T2DM & respiratory failure presents ot the ED complaining of low H & H. Pt had labs drawn and her doctor sent her here for low hemoglobin. Pt is on 3L nasal cannula at home, was intubated in February due to respiratory failure. She is alert & oriented x 4, able to follow commands but is anxious. She is currently tachypneic to the 30's, sats in the 70's on room air but now 94% on 6L nasal cannula. On cardiac monitor, pt tachy to the 110's, S1S2 heard. Abdomen soft & non distended. Pt uses a walker to ambulate at home. B/l lower +4 pitting edema/lymphedema. Unable to assess peripheral pulses due to edema. Skin color consistent with race. Skin clean, warm, dry & intact. She is a former smoker, quit 5 years ago. On cardiac monitor. Denies chest pain, n/v/d, fever, cough, blood in stools, dysuria.

## 2020-07-01 NOTE — ED CLERICAL - NS ED CLERK NOTE PRE-ARRIVAL INFORMATION; ADDITIONAL PRE-ARRIVAL INFORMATION
CC/Reason For referral: Anemia  Preferred Consultant(if applicable): N/A  Who admits for you (if needed): [facility]  Do you have documents you would like to fax over? No  Would you still like to speak to an ED attending? No

## 2020-07-01 NOTE — ED PROVIDER NOTE - OBJECTIVE STATEMENT
74 year old female w/ pmh of diabetes, hypertension, lymphedema and iron deficiency anemia told to come to ED by PCP for low hemoglobin (7.6). Pt reports chronic shortness of breath and uses 3L O2 at home. SOB is worse with exertion and better with rest. Denies any fever, headache, dizziness, nausea, nbnb emesis, chest pain, abdominal pain, changes in BM or urinary discomfort. Denies any contact with COVID+ patients and has never been tested.

## 2020-07-01 NOTE — ED PROVIDER NOTE - CLINICAL SUMMARY MEDICAL DECISION MAKING FREE TEXT BOX
74 year old female w/ pmh of diabetes, hypertension, lymphedema and iron deficiency anemia coming in for SOB on exertion concerning for worsening anemia vs fluid overload vs infection. Will order CBC, BMP, BNP, chest xray and will place the patient and will treat her SOB with 5L NC.

## 2020-07-01 NOTE — ED ADULT NURSE NOTE - NS ED NURSE REPORT GIVEN TO FT
Pt received bed assignment. Pt aware. Report given to Miya MICHELLE. SUSSYS. Pt stable for transport. Chart given to charge desk. Will cont to monitor.

## 2020-07-01 NOTE — ED ADULT NURSE NOTE - NSIMPLEMENTINTERV_GEN_ALL_ED
Implemented All Universal Safety Interventions:  [place] to call system. Call bell, personal items and telephone within reach. Instruct patient to call for assistance. Room bathroom lighting operational. Non-slip footwear when patient is off stretcher. Physically safe environment: no spills, clutter or unnecessary equipment. Stretcher in lowest position, wheels locked, appropriate side rails in place.

## 2020-07-01 NOTE — ED ADULT NURSE REASSESSMENT NOTE - NS ED NURSE REASSESS COMMENT FT1
Patient given extra blankets for comfort. Pt placed in position of comfort. Pt educated on call bell system and provided call bell. Bed in lowest position, wheels locked, appropriate side rails raised. Pt denies needs at this time.

## 2020-07-01 NOTE — ED PROVIDER NOTE - PROGRESS NOTE DETAILS
Patient on 5L NC with improved SOB. Dr. Alves Note: reviewed labs and cxr, spoke to patient and  for about 30 minutes.  Concern is patient with fluid overload pulmonary edema with chronic lymphedema with worsening anemia, blood transfusion may worsen this condition.  Will transfuse and give lasix and will require close monitoring, repeat echo, and further inpatient management.  Pt and  agreeable to plan.  Pt is full code (rescinded DNI).  Stable for admission.

## 2020-07-01 NOTE — ED PROVIDER NOTE - CRITICAL CARE PROVIDED
interpretation of diagnostic studies/consult w/ pt's family directly relating to pts condition/additional history taking/direct patient care (not related to procedure)/documentation

## 2020-07-01 NOTE — ED ADULT NURSE REASSESSMENT NOTE - NS ED NURSE REASSESS COMMENT FT1
PRBC's given. Consent in chart. Risks and benefits explained to patient. Patient verbalized understanding of risks and benefits. Patient aware of possible side effects. Vital signs stable. Second RN at bedside for confirmation.

## 2020-07-02 DIAGNOSIS — D64.9 ANEMIA, UNSPECIFIED: ICD-10-CM

## 2020-07-02 DIAGNOSIS — E66.2 MORBID (SEVERE) OBESITY WITH ALVEOLAR HYPOVENTILATION: ICD-10-CM

## 2020-07-02 DIAGNOSIS — E11.49 TYPE 2 DIABETES MELLITUS WITH OTHER DIABETIC NEUROLOGICAL COMPLICATION: ICD-10-CM

## 2020-07-02 DIAGNOSIS — I15.9 SECONDARY HYPERTENSION, UNSPECIFIED: ICD-10-CM

## 2020-07-02 DIAGNOSIS — J81.0 ACUTE PULMONARY EDEMA: ICD-10-CM

## 2020-07-02 DIAGNOSIS — G47.33 OBSTRUCTIVE SLEEP APNEA (ADULT) (PEDIATRIC): ICD-10-CM

## 2020-07-02 DIAGNOSIS — E78.5 HYPERLIPIDEMIA, UNSPECIFIED: ICD-10-CM

## 2020-07-02 LAB
ALBUMIN SERPL ELPH-MCNC: 3.2 G/DL — LOW (ref 3.3–5)
ALP SERPL-CCNC: 68 U/L — SIGNIFICANT CHANGE UP (ref 40–120)
ALT FLD-CCNC: 11 U/L — SIGNIFICANT CHANGE UP (ref 10–45)
ANION GAP SERPL CALC-SCNC: 9 MMOL/L — SIGNIFICANT CHANGE UP (ref 5–17)
AST SERPL-CCNC: 13 U/L — SIGNIFICANT CHANGE UP (ref 10–40)
BASOPHILS # BLD AUTO: 0.03 K/UL — SIGNIFICANT CHANGE UP (ref 0–0.2)
BASOPHILS NFR BLD AUTO: 0.4 % — SIGNIFICANT CHANGE UP (ref 0–2)
BILIRUB SERPL-MCNC: 0.5 MG/DL — SIGNIFICANT CHANGE UP (ref 0.2–1.2)
BUN SERPL-MCNC: 36 MG/DL — HIGH (ref 7–23)
CALCIUM SERPL-MCNC: 9.4 MG/DL — SIGNIFICANT CHANGE UP (ref 8.4–10.5)
CHLORIDE SERPL-SCNC: 108 MMOL/L — SIGNIFICANT CHANGE UP (ref 96–108)
CO2 SERPL-SCNC: 31 MMOL/L — SIGNIFICANT CHANGE UP (ref 22–31)
CREAT SERPL-MCNC: 1.23 MG/DL — SIGNIFICANT CHANGE UP (ref 0.5–1.3)
EOSINOPHIL # BLD AUTO: 0.75 K/UL — HIGH (ref 0–0.5)
EOSINOPHIL NFR BLD AUTO: 8.9 % — HIGH (ref 0–6)
GLUCOSE BLDC GLUCOMTR-MCNC: 128 MG/DL — HIGH (ref 70–99)
GLUCOSE BLDC GLUCOMTR-MCNC: 128 MG/DL — HIGH (ref 70–99)
GLUCOSE BLDC GLUCOMTR-MCNC: 129 MG/DL — HIGH (ref 70–99)
GLUCOSE BLDC GLUCOMTR-MCNC: 160 MG/DL — HIGH (ref 70–99)
GLUCOSE SERPL-MCNC: 126 MG/DL — HIGH (ref 70–99)
HCT VFR BLD CALC: 25.9 % — LOW (ref 34.5–45)
HCT VFR BLD CALC: 26 % — LOW (ref 34.5–45)
HCT VFR BLD CALC: 27.8 % — LOW (ref 34.5–45)
HGB BLD-MCNC: 7.5 G/DL — LOW (ref 11.5–15.5)
HGB BLD-MCNC: 7.6 G/DL — LOW (ref 11.5–15.5)
HGB BLD-MCNC: 8.1 G/DL — LOW (ref 11.5–15.5)
IMM GRANULOCYTES NFR BLD AUTO: 0.7 % — SIGNIFICANT CHANGE UP (ref 0–1.5)
LYMPHOCYTES # BLD AUTO: 0.59 K/UL — LOW (ref 1–3.3)
LYMPHOCYTES # BLD AUTO: 7 % — LOW (ref 13–44)
MCHC RBC-ENTMCNC: 28.8 GM/DL — LOW (ref 32–36)
MCHC RBC-ENTMCNC: 29.1 GM/DL — LOW (ref 32–36)
MCHC RBC-ENTMCNC: 29.3 GM/DL — LOW (ref 32–36)
MCHC RBC-ENTMCNC: 31.4 PG — SIGNIFICANT CHANGE UP (ref 27–34)
MCHC RBC-ENTMCNC: 31.5 PG — SIGNIFICANT CHANGE UP (ref 27–34)
MCHC RBC-ENTMCNC: 31.6 PG — SIGNIFICANT CHANGE UP (ref 27–34)
MCV RBC AUTO: 107.5 FL — HIGH (ref 80–100)
MCV RBC AUTO: 108.6 FL — HIGH (ref 80–100)
MCV RBC AUTO: 108.8 FL — HIGH (ref 80–100)
MONOCYTES # BLD AUTO: 0.59 K/UL — SIGNIFICANT CHANGE UP (ref 0–0.9)
MONOCYTES NFR BLD AUTO: 7 % — SIGNIFICANT CHANGE UP (ref 2–14)
NEUTROPHILS # BLD AUTO: 6.44 K/UL — SIGNIFICANT CHANGE UP (ref 1.8–7.4)
NEUTROPHILS NFR BLD AUTO: 76 % — SIGNIFICANT CHANGE UP (ref 43–77)
NRBC # BLD: 0 /100 WBCS — SIGNIFICANT CHANGE UP (ref 0–0)
PLATELET # BLD AUTO: 170 K/UL — SIGNIFICANT CHANGE UP (ref 150–400)
PLATELET # BLD AUTO: 174 K/UL — SIGNIFICANT CHANGE UP (ref 150–400)
PLATELET # BLD AUTO: 185 K/UL — SIGNIFICANT CHANGE UP (ref 150–400)
POTASSIUM SERPL-MCNC: 4.7 MMOL/L — SIGNIFICANT CHANGE UP (ref 3.5–5.3)
POTASSIUM SERPL-SCNC: 4.7 MMOL/L — SIGNIFICANT CHANGE UP (ref 3.5–5.3)
PROT SERPL-MCNC: 6.5 G/DL — SIGNIFICANT CHANGE UP (ref 6–8.3)
RBC # BLD: 2.39 M/UL — LOW (ref 3.8–5.2)
RBC # BLD: 2.41 M/UL — LOW (ref 3.8–5.2)
RBC # BLD: 2.56 M/UL — LOW (ref 3.8–5.2)
RBC # FLD: 17.7 % — HIGH (ref 10.3–14.5)
RBC # FLD: 17.9 % — HIGH (ref 10.3–14.5)
RBC # FLD: 18.1 % — HIGH (ref 10.3–14.5)
SARS-COV-2 IGG SERPL QL IA: NEGATIVE — SIGNIFICANT CHANGE UP
SARS-COV-2 IGM SERPL IA-ACNC: <3.8 AU/ML — SIGNIFICANT CHANGE UP
SODIUM SERPL-SCNC: 148 MMOL/L — HIGH (ref 135–145)
WBC # BLD: 8.46 K/UL — SIGNIFICANT CHANGE UP (ref 3.8–10.5)
WBC # BLD: 8.51 K/UL — SIGNIFICANT CHANGE UP (ref 3.8–10.5)
WBC # BLD: 8.97 K/UL — SIGNIFICANT CHANGE UP (ref 3.8–10.5)
WBC # FLD AUTO: 8.46 K/UL — SIGNIFICANT CHANGE UP (ref 3.8–10.5)
WBC # FLD AUTO: 8.51 K/UL — SIGNIFICANT CHANGE UP (ref 3.8–10.5)
WBC # FLD AUTO: 8.97 K/UL — SIGNIFICANT CHANGE UP (ref 3.8–10.5)

## 2020-07-02 RX ORDER — LEVOTHYROXINE SODIUM 125 MCG
25 TABLET ORAL DAILY
Refills: 0 | Status: DISCONTINUED | OUTPATIENT
Start: 2020-07-02 | End: 2020-07-14

## 2020-07-02 RX ORDER — DEXTROSE 50 % IN WATER 50 %
12.5 SYRINGE (ML) INTRAVENOUS ONCE
Refills: 0 | Status: DISCONTINUED | OUTPATIENT
Start: 2020-07-02 | End: 2020-07-14

## 2020-07-02 RX ORDER — LATANOPROST 0.05 MG/ML
1 SOLUTION/ DROPS OPHTHALMIC; TOPICAL AT BEDTIME
Refills: 0 | Status: DISCONTINUED | OUTPATIENT
Start: 2020-07-02 | End: 2020-07-04

## 2020-07-02 RX ORDER — INSULIN LISPRO 100/ML
VIAL (ML) SUBCUTANEOUS
Refills: 0 | Status: DISCONTINUED | OUTPATIENT
Start: 2020-07-02 | End: 2020-07-14

## 2020-07-02 RX ORDER — DEXTROSE 50 % IN WATER 50 %
25 SYRINGE (ML) INTRAVENOUS ONCE
Refills: 0 | Status: DISCONTINUED | OUTPATIENT
Start: 2020-07-02 | End: 2020-07-14

## 2020-07-02 RX ORDER — CHLORHEXIDINE GLUCONATE 213 G/1000ML
1 SOLUTION TOPICAL DAILY
Refills: 0 | Status: DISCONTINUED | OUTPATIENT
Start: 2020-07-02 | End: 2020-07-11

## 2020-07-02 RX ORDER — ACETAMINOPHEN 500 MG
650 TABLET ORAL EVERY 6 HOURS
Refills: 0 | Status: DISCONTINUED | OUTPATIENT
Start: 2020-07-02 | End: 2020-07-14

## 2020-07-02 RX ORDER — ALLOPURINOL 300 MG
300 TABLET ORAL DAILY
Refills: 0 | Status: DISCONTINUED | OUTPATIENT
Start: 2020-07-02 | End: 2020-07-14

## 2020-07-02 RX ORDER — ENOXAPARIN SODIUM 100 MG/ML
40 INJECTION SUBCUTANEOUS DAILY
Refills: 0 | Status: DISCONTINUED | OUTPATIENT
Start: 2020-07-02 | End: 2020-07-07

## 2020-07-02 RX ORDER — METFORMIN HYDROCHLORIDE 850 MG/1
500 TABLET ORAL
Refills: 0 | Status: DISCONTINUED | OUTPATIENT
Start: 2020-07-02 | End: 2020-07-02

## 2020-07-02 RX ORDER — SODIUM CHLORIDE 9 MG/ML
1000 INJECTION, SOLUTION INTRAVENOUS
Refills: 0 | Status: DISCONTINUED | OUTPATIENT
Start: 2020-07-02 | End: 2020-07-14

## 2020-07-02 RX ORDER — ATORVASTATIN CALCIUM 80 MG/1
10 TABLET, FILM COATED ORAL AT BEDTIME
Refills: 0 | Status: DISCONTINUED | OUTPATIENT
Start: 2020-07-02 | End: 2020-07-14

## 2020-07-02 RX ORDER — GLUCAGON INJECTION, SOLUTION 0.5 MG/.1ML
1 INJECTION, SOLUTION SUBCUTANEOUS ONCE
Refills: 0 | Status: DISCONTINUED | OUTPATIENT
Start: 2020-07-02 | End: 2020-07-14

## 2020-07-02 RX ORDER — DOXAZOSIN MESYLATE 4 MG
4 TABLET ORAL AT BEDTIME
Refills: 0 | Status: DISCONTINUED | OUTPATIENT
Start: 2020-07-02 | End: 2020-07-14

## 2020-07-02 RX ORDER — ASPIRIN/CALCIUM CARB/MAGNESIUM 324 MG
81 TABLET ORAL DAILY
Refills: 0 | Status: DISCONTINUED | OUTPATIENT
Start: 2020-07-02 | End: 2020-07-14

## 2020-07-02 RX ORDER — DEXTROSE 50 % IN WATER 50 %
15 SYRINGE (ML) INTRAVENOUS ONCE
Refills: 0 | Status: DISCONTINUED | OUTPATIENT
Start: 2020-07-02 | End: 2020-07-14

## 2020-07-02 RX ORDER — INSULIN LISPRO 100/ML
VIAL (ML) SUBCUTANEOUS AT BEDTIME
Refills: 0 | Status: DISCONTINUED | OUTPATIENT
Start: 2020-07-02 | End: 2020-07-14

## 2020-07-02 RX ORDER — METOPROLOL TARTRATE 50 MG
50 TABLET ORAL DAILY
Refills: 0 | Status: DISCONTINUED | OUTPATIENT
Start: 2020-07-02 | End: 2020-07-14

## 2020-07-02 RX ORDER — IPRATROPIUM/ALBUTEROL SULFATE 18-103MCG
3 AEROSOL WITH ADAPTER (GRAM) INHALATION ONCE
Refills: 0 | Status: COMPLETED | OUTPATIENT
Start: 2020-07-02 | End: 2020-07-02

## 2020-07-02 RX ADMIN — Medication 0.2 MILLIGRAM(S): at 03:14

## 2020-07-02 RX ADMIN — Medication 4 MILLIGRAM(S): at 22:15

## 2020-07-02 RX ADMIN — ATORVASTATIN CALCIUM 10 MILLIGRAM(S): 80 TABLET, FILM COATED ORAL at 22:16

## 2020-07-02 RX ADMIN — Medication 50 MILLIGRAM(S): at 06:01

## 2020-07-02 RX ADMIN — Medication 3 MILLILITER(S): at 03:14

## 2020-07-02 RX ADMIN — Medication 0.2 MILLIGRAM(S): at 22:15

## 2020-07-02 RX ADMIN — Medication 300 MILLIGRAM(S): at 13:50

## 2020-07-02 RX ADMIN — ENOXAPARIN SODIUM 40 MILLIGRAM(S): 100 INJECTION SUBCUTANEOUS at 22:16

## 2020-07-02 RX ADMIN — Medication 81 MILLIGRAM(S): at 13:50

## 2020-07-02 RX ADMIN — Medication 25 MICROGRAM(S): at 06:01

## 2020-07-02 RX ADMIN — CHLORHEXIDINE GLUCONATE 1 APPLICATION(S): 213 SOLUTION TOPICAL at 13:52

## 2020-07-02 RX ADMIN — LATANOPROST 1 DROP(S): 0.05 SOLUTION/ DROPS OPHTHALMIC; TOPICAL at 22:18

## 2020-07-02 NOTE — H&P ADULT - HISTORY OF PRESENT ILLNESS
74yr old f with a pmhx of FREDI on CPAP, Obesity hypoventilation syndrome, Chronic use of 3L of home continuous home oxygen, diabetes, hypertension, chronic lymphedema and iron deficiency anemia told to come to ED by PCP for low hemoglobin (7.6). Patient reports how in february of this year she was hospitalized and intubated for respiratory failure and discharged to rehab. She has gradually been tapered down to 3L of continuous oxygen at home now. She reports that is chronically short of breath but feels it is more related to her morbid obesity and deconditioned overall state. She is now dependant on a walker and cane to mobilize around the home and is unable to leave the home due to stairs. Patient notes she increased shortness of breath is notable with exertion and she fears the need for returning to the hospital. She spoke to her PCP and asked for blood work to be done approximately 3 weeks ago. At that time her hemoglobin reportedly dropped to the mid 8 and so she independently began to double up on her ferrous sulfate supplements. Recently she had the PCP follow up on the blood work in hopes of improvement only to find the hemoglobin had worsened to 7.6 and she was sent to the Emergency room for further workup. The ambulance came and brought her into the hospital however per the patient she denies any fever, headache, dizziness, nausea, nbnb emesis, chest pain, abdominal pain, changes in BM or urinary discomfort.

## 2020-07-02 NOTE — H&P ADULT - NSICDXPASTSURGICALHX_GEN_ALL_CORE_FT
PAST SURGICAL HISTORY:  S/P hernia repair First ventral hernia repair in 1996 at [facility], Second ventral hernia repair with mesh at [facility] in 2007    S/P hernia repair ventral hernia repair Jan 2016    S/P hysterectomy

## 2020-07-02 NOTE — H&P ADULT - ATTENDING COMMENTS
74yr old f with a pmhx of FREDI on CPAP, Obesity hypoventilation syndrome, Chronic use of 3L of home continuous home oxygen, diabetes, hypertension, chronic lymphedema and iron deficiency anemia told to come to ED by PCP for low hemoglobin (7.6).  acute on chronic hypoxic respi failure    #symptomatic anemia: no s/s gi bleeding or gross bleeding  pt hx of iron deffi anemia on iron pills  r/o anemia of chronic dz  sp 1u prbc, monitor h/h closely  check rpt cbc now, if low h/h will call GI    #respi failure  improved, cont o2 nasal canula  cpap prn  pulm cs  cards cs    #dvt ppx-start lovenox if hb stable

## 2020-07-02 NOTE — H&P ADULT - PROBLEM SELECTOR PLAN 1
increased supportive oxygen needs. patient responded well to lasix 40 mg iv push. may need to improve compliance with cpap at home. pulm consult if worsens. continue to monitor oxygen closely.

## 2020-07-02 NOTE — H&P ADULT - ASSESSMENT
74 yr old f with recent hx of acute respiratory failure presents to the emergency room with acute hypoxic respiratory failure secondary to volume overload and acute anemia.

## 2020-07-02 NOTE — CHART NOTE - NSCHARTNOTEFT_GEN_A_CORE
Started Lovenox for DVT prophylaxis as per Dr Rocha  Hb 8.1 after 1 prbc.  Maribel Charlton NP  [phone]

## 2020-07-02 NOTE — H&P ADULT - NSHPLABSRESULTS_GEN_ALL_CORE
LABS:                        7.4    8.75  )-----------( 180      ( 01 Jul 2020 19:47 )             26.1     07-01    146<H>  |  107  |  40<H>  ----------------------------<  139<H>  4.3   |  29  |  1.26    Ca    9.7      01 Jul 2020 19:47    TPro  7.2  /  Alb  3.6  /  TBili  0.5  /  DBili  x   /  AST  9<L>  /  ALT  12  /  AlkPhos  78  07-01    PT/INR - ( 01 Jul 2020 19:47 )   PT: 15.6 sec;   INR: 1.39 ratio         PTT - ( 01 Jul 2020 19:47 )  PTT:32.5 sec  CAPILLARY BLOOD GLUCOSE                RADIOLOGY & ADDITIONAL TESTS:    Imaging Personally Reviewed:  [x] YES  [ ] NO    Consultant(s) Notes Reviewed:  [x] YES  [ ] NO    Care Discussed with Consultants/Other Providers [x] YES  [ ] NO

## 2020-07-02 NOTE — H&P ADULT - NSICDXPASTMEDICALHX_GEN_ALL_CORE_FT
100.3
PAST MEDICAL HISTORY:  Diabetes mellitus type 2, controlled     HLD (hyperlipidemia)     Hypertension     Obesity hypoventilation syndrome     Obesity, morbid     FREDI on CPAP     Ventral hernia

## 2020-07-02 NOTE — H&P ADULT - NSHPPHYSICALEXAM_GEN_ALL_CORE
GENERAL: NAD, AAOx3, morbidly obese  HEAD:  Atraumatic, Normocephalic  EYES: EOMI, conjunctiva and sclera clear  NECK: Supple, No LAD  CHEST/LUNG: Clear but diminished bilaterally , No wheeze  HEART: Regular rate and rhythm; No murmurs, rubs, or gallops  ABDOMEN: Soft, Nontender, Nondistended; Hernia +, Bowel sounds +  EXTREMITIES: + Pulses, +bilateral chronic lymphedema   SKIN: No rashes or lesions

## 2020-07-02 NOTE — H&P ADULT - NSHPREVIEWOFSYSTEMS_GEN_ALL_CORE
General:+ general  weakness, no fever/chills, no weight loss/gain  Skin/Breast: no rash, no jaundice  Ophthalmologic: no vision changes, no dry eyes   Respiratory and Thorax: no cough, no wheezing, no hemoptysis, + dyspnea on exertion   Cardiovascular: no chest pain, + shortness of breath on exertion , no orthopnea  Gastrointestinal: no n/v/d, no abdominal pain, no dysphagia   Genitourinary: no dysuria, no frequency, no nocturia, no hematuria  Musculoskeletal: no trauma, no sprain/strain, no myalgias, no arthralgias, no fracture  Neurological: no HA, no dizziness, no weakness, no numbness  Psychiatric: no depression, no SI/HI  Hematology/Lymphatics: no easy bruising  Endocrine: no heat or cold intolerance. no weight gain or loss  Allergic/Immunologic: no allergy or recent reaction

## 2020-07-03 ENCOUNTER — TRANSCRIPTION ENCOUNTER (OUTPATIENT)
Age: 75
End: 2020-07-03

## 2020-07-03 DIAGNOSIS — N17.9 ACUTE KIDNEY FAILURE, UNSPECIFIED: ICD-10-CM

## 2020-07-03 LAB
A1C WITH ESTIMATED AVERAGE GLUCOSE RESULT: 5.9 % — HIGH (ref 4–5.6)
ALBUMIN SERPL ELPH-MCNC: 3.4 G/DL — SIGNIFICANT CHANGE UP (ref 3.3–5)
ALP SERPL-CCNC: 72 U/L — SIGNIFICANT CHANGE UP (ref 40–120)
ALT FLD-CCNC: 12 U/L — SIGNIFICANT CHANGE UP (ref 10–45)
ANION GAP SERPL CALC-SCNC: 7 MMOL/L — SIGNIFICANT CHANGE UP (ref 5–17)
AST SERPL-CCNC: 11 U/L — SIGNIFICANT CHANGE UP (ref 10–40)
BASE EXCESS BLDA CALC-SCNC: 8.9 MMOL/L — HIGH (ref -2–2)
BILIRUB SERPL-MCNC: 0.5 MG/DL — SIGNIFICANT CHANGE UP (ref 0.2–1.2)
BUN SERPL-MCNC: 36 MG/DL — HIGH (ref 7–23)
CALCIUM SERPL-MCNC: 9.4 MG/DL — SIGNIFICANT CHANGE UP (ref 8.4–10.5)
CHLORIDE SERPL-SCNC: 106 MMOL/L — SIGNIFICANT CHANGE UP (ref 96–108)
CO2 BLDA-SCNC: 39 MMOL/L — HIGH (ref 22–30)
CO2 SERPL-SCNC: 34 MMOL/L — HIGH (ref 22–31)
CREAT SERPL-MCNC: 1.47 MG/DL — HIGH (ref 0.5–1.3)
ESTIMATED AVERAGE GLUCOSE: 123 MG/DL — HIGH (ref 68–114)
GLUCOSE BLDC GLUCOMTR-MCNC: 122 MG/DL — HIGH (ref 70–99)
GLUCOSE BLDC GLUCOMTR-MCNC: 139 MG/DL — HIGH (ref 70–99)
GLUCOSE BLDC GLUCOMTR-MCNC: 156 MG/DL — HIGH (ref 70–99)
GLUCOSE BLDC GLUCOMTR-MCNC: 158 MG/DL — HIGH (ref 70–99)
GLUCOSE SERPL-MCNC: 119 MG/DL — HIGH (ref 70–99)
HCO3 BLDA-SCNC: 37 MMOL/L — HIGH (ref 21–29)
HCT VFR BLD CALC: 28.7 % — LOW (ref 34.5–45)
HGB BLD-MCNC: 8.3 G/DL — LOW (ref 11.5–15.5)
HOROWITZ INDEX BLDA+IHG-RTO: 44 — SIGNIFICANT CHANGE UP
MCHC RBC-ENTMCNC: 28.9 GM/DL — LOW (ref 32–36)
MCHC RBC-ENTMCNC: 32 PG — SIGNIFICANT CHANGE UP (ref 27–34)
MCV RBC AUTO: 110.8 FL — HIGH (ref 80–100)
NRBC # BLD: 0 /100 WBCS — SIGNIFICANT CHANGE UP (ref 0–0)
PCO2 BLDA: 80 MMHG — CRITICAL HIGH (ref 32–46)
PH BLDA: 7.28 — LOW (ref 7.35–7.45)
PLATELET # BLD AUTO: 166 K/UL — SIGNIFICANT CHANGE UP (ref 150–400)
PO2 BLDA: 79 MMHG — SIGNIFICANT CHANGE UP (ref 74–108)
POTASSIUM SERPL-MCNC: 4.8 MMOL/L — SIGNIFICANT CHANGE UP (ref 3.5–5.3)
POTASSIUM SERPL-SCNC: 4.8 MMOL/L — SIGNIFICANT CHANGE UP (ref 3.5–5.3)
PROT SERPL-MCNC: 6.8 G/DL — SIGNIFICANT CHANGE UP (ref 6–8.3)
RBC # BLD: 2.59 M/UL — LOW (ref 3.8–5.2)
RBC # FLD: 17.4 % — HIGH (ref 10.3–14.5)
SAO2 % BLDA: 95 % — SIGNIFICANT CHANGE UP (ref 92–96)
SODIUM SERPL-SCNC: 147 MMOL/L — HIGH (ref 135–145)
WBC # BLD: 7.54 K/UL — SIGNIFICANT CHANGE UP (ref 3.8–10.5)
WBC # FLD AUTO: 7.54 K/UL — SIGNIFICANT CHANGE UP (ref 3.8–10.5)

## 2020-07-03 RX ADMIN — LATANOPROST 1 DROP(S): 0.05 SOLUTION/ DROPS OPHTHALMIC; TOPICAL at 21:08

## 2020-07-03 RX ADMIN — ENOXAPARIN SODIUM 40 MILLIGRAM(S): 100 INJECTION SUBCUTANEOUS at 11:24

## 2020-07-03 RX ADMIN — Medication 50 MILLIGRAM(S): at 05:22

## 2020-07-03 RX ADMIN — Medication 25 MICROGRAM(S): at 05:22

## 2020-07-03 RX ADMIN — Medication 300 MILLIGRAM(S): at 11:24

## 2020-07-03 RX ADMIN — Medication 1: at 17:19

## 2020-07-03 RX ADMIN — Medication 4 MILLIGRAM(S): at 21:09

## 2020-07-03 RX ADMIN — Medication 0.2 MILLIGRAM(S): at 21:09

## 2020-07-03 RX ADMIN — ATORVASTATIN CALCIUM 10 MILLIGRAM(S): 80 TABLET, FILM COATED ORAL at 21:09

## 2020-07-03 RX ADMIN — CHLORHEXIDINE GLUCONATE 1 APPLICATION(S): 213 SOLUTION TOPICAL at 11:24

## 2020-07-03 RX ADMIN — Medication 81 MILLIGRAM(S): at 11:24

## 2020-07-03 NOTE — CHART NOTE - NSCHARTNOTEFT_GEN_A_CORE
Patient with h/o COPD, admitted with SOB, Anemia and pulm edema.  Seen by pulm.  Ordered cont bipap. Ordered ABG -shows Resp acidosis  Patient refused Bipap, despite explanation and encouragement.  DW Dr Rocha.  Maribel Charlton NP  [phone]

## 2020-07-03 NOTE — CONSULT NOTE ADULT - ASSESSMENT
74yr old f with a pmhx of FREDI on CPAP, Obesity hypoventilation syndrome, Chronic use of 3L of home continuous home oxygen, diabetes, hypertension, chronic lymphedema and iron deficiency anemia told to come to ED by PCP for low hemoglobin (7.6).     TTE 2/2020  grossly normal lvef  trace pericardial effusion

## 2020-07-03 NOTE — DISCHARGE NOTE PROVIDER - NSDCCPCAREPLAN_GEN_ALL_CORE_FT
PRINCIPAL DISCHARGE DIAGNOSIS  Diagnosis: Symptomatic anemia  Assessment and Plan of Treatment: ANemia-sp transfusion, hemoglobin improved.  Stool for occult blood negative.  likely anemia of chronic disease  Follow up with your PMD        SECONDARY DISCHARGE DIAGNOSES  Diagnosis: FREDI on CPAP  Assessment and Plan of Treatment: Continue Cpap    Diagnosis: LANE (acute kidney injury)  Assessment and Plan of Treatment:   Avoid taking (NSAIDs) - (ex: Ibuprofen, Advil, Celebrex, Naprosyn)  Avoid taking any nephrotoxic agents (can harm kidneys) - Intravenous contrast for diagnostic testing, combination cold medications.  Have all medications adjusted for your renal function by your Health Care Provider.  Blood pressure control is important.  Take all medication as prescribed.      Diagnosis: Acute pulmonary edema  Assessment and Plan of Treatment: Fluid overload state, chest X ray with pulmonary edema  Now improved  Lasix held due to LANE PRINCIPAL DISCHARGE DIAGNOSIS  Diagnosis: Symptomatic anemia  Assessment and Plan of Treatment: Anemia-status post transfusion, hemoglobin improved.  Stool for occult blood negative.  You will need to follow up with your Hematologist, Dr. Gutierrez, within one week of discharge - please call to make an appointment.  At this time, you will discuss setting up a bone marrow biopsy.  You will also need a CBC drawn at this time to monitor your hemoglobin and hematocrit.        SECONDARY DISCHARGE DIAGNOSES  Diagnosis: FREDI on CPAP  Assessment and Plan of Treatment: Continue Cpap    Diagnosis: LANE (acute kidney injury)  Assessment and Plan of Treatment:   Avoid taking (NSAIDs) - (ex: Ibuprofen, Advil, Celebrex, Naprosyn)  Avoid taking any nephrotoxic agents (can harm kidneys) - Intravenous contrast for diagnostic testing, combination cold medications.  Have all medications adjusted for your renal function by your Health Care Provider.  Blood pressure control is important.  Take all medication as prescribed.      Diagnosis: Acute pulmonary edema  Assessment and Plan of Treatment: Fluid overload state, chest X ray with pulmonary edema  Now improved  Lasix held due to LANE PRINCIPAL DISCHARGE DIAGNOSIS  Diagnosis: Symptomatic anemia  Assessment and Plan of Treatment: Anemia-status post transfusion, hemoglobin improved.  Stool for occult blood negative.  You will need to follow up with your Hematologist, Dr. Gutierrez, within one week of discharge - please call to make an appointment.  At this time, you will discuss setting up a bone marrow biopsy.  You will also need a CBC drawn at this time to monitor your hemoglobin and hematocrit.        SECONDARY DISCHARGE DIAGNOSES  Diagnosis: FREDI on CPAP  Assessment and Plan of Treatment: Continue Bipap    Diagnosis: LANE (acute kidney injury)  Assessment and Plan of Treatment:   Avoid taking (NSAIDs) - (ex: Ibuprofen, Advil, Celebrex, Naprosyn)  Avoid taking any nephrotoxic agents (can harm kidneys) - Intravenous contrast for diagnostic testing, combination cold medications.  Have all medications adjusted for your renal function by your Health Care Provider.  Blood pressure control is important.  Take all medication as prescribed.      Diagnosis: Secondary hypertension  Assessment and Plan of Treatment: You must follow up with your cardiologist upon discharge - please call to make an appointment.  Low salt diet  Activity as tolerated.  Take all medication as prescribed.  Follow up with your medical doctor for routine blood pressure monitoring at your next visit.  Notify your doctor if you have any of the following symptoms:   Dizziness, Lightheadedness, Blurry vision, Headache, Chest pain, Shortness of breath      Diagnosis: Acute pulmonary edema  Assessment and Plan of Treatment: Fluid overload state, chest X ray with pulmonary edema  Now improved  You will now take your lasix every other day, due to LANE  You must follow up with your pulmonologist upon discharge - please call to make an appointment.      Diagnosis: Controlled type 2 diabetes mellitus with other neurologic complication  Assessment and Plan of Treatment: Make sure you get your HgA1c checked every three months.  If you take oral diabetes medications, check your blood glucose two times a day.  If you take insulin, check your blood glucose before meals and at bedtime.  It's important not to skip any meals.  Keep a log of your blood glucose results and always take it with you to your doctor appointments.  Keep a list of your current medications including injectables and over the counter medications and bring this medication list with you to all your doctor appointments.  If you have not seen your ophthalmologist this year call for appointment.  Check your feet daily for redness, sores, or openings. Do not self treat. If no improvement in two days call your primary care physician for an appointment.  Low blood sugar (hypoglycemia) is a blood sugar below 70mg/dl. Check your blood sugar if you feel signs/symptoms of hypoglycemia. If your blood sugar is below 70 take 15 grams of carbohydrates (ex 4 oz of apple juice, 3-4 glucose tablets, or 4-6 oz of regular soda) wait 15 minutes and repeat blood sugar to make sure it comes up above 70.  If your blood sugar is above 70 and you are due for a meal, have a meal.  If you are not due for a meal have a snack.  This snack helps keeps your blood sugar at a safe range.

## 2020-07-03 NOTE — PROGRESS NOTE ADULT - ASSESSMENT
75 y/o female PMHx FREDI, obesity hypoventilation syndrome, DM2, HLD, LE lymphedema, on CPAP at night recently noncompliant, presents w symptomatic anemia    #ANemia-sp transfusion-improvement  occult neg  likely anemia of chr dz    #Fluid overload state-improved  sp lasix x1 in ED  cards cs appreciated fu recs  start lasix in am pending improved renal function  .  # acute hypercapnic and hypoxic resp failure   appreciate pulm cs  ABGs, NIV as needed      # LANE  last creat 1.2 in 12/2019  Cr stable at 1.4, trend    # HTN   on clonidine and toprol XL  switched to oral lasix  TTE nml systolic fxn  CXR pulm edema    # hypothyroidism  takes synthroid 25mcg qd per [facility] EMR    # DM2   hold oral meds  SSI  a1c 6.7    DVt ppx  PT eval- dc planning-

## 2020-07-03 NOTE — CONSULT NOTE ADULT - ASSESSMENT
Morbid obesity with sleep apnea and/or OVS  CHronic hypercapneic resp failure - possibly acute on chronic, though no ABG done  Likely udnerlying COR pulmonale, acute on chronic  Anemia, uncertain etiology  No clinical suspicion of pulmonary infection    REC    Check ABG  Continue home CPAP  COntinue aggressive diuresis as tolerated: monitor for contraction alkalsosis  Nasal O2 day titrated to sat >/= 89%  LE dopplers Morbid obesity with sleep apnea and/or OVS  CHronic hypercapneic resp failure - possibly acute on chronic, though no ABG done  Likely udnerlying COR pulmonale, acute on chronic  Anemia, uncertain etiology  No clinical suspicion of pulmonary infection  CXR of limitetd utility in diagnosing pulm edema:     REC    Check ABG  Continue home CPAP  COntinue aggressive diuresis as tolerated: monitor for contraction alkalsosis  Nasal O2 day titrated to sat >/= 89%  LE dopplers  Check PBNP

## 2020-07-03 NOTE — CONSULT NOTE ADULT - ASSESSMENT
75 yo F w/ symptomatic anemia  Macrocytosis, FOBT neg   No evidence of GI etiology for anemia  Patient is up to date w colonoscopy and refuses an upper endoscopy. Requesting to be discharged home when optimized but currently being diuresed  Recommend Iron studies / vb12 / folate w AM labs

## 2020-07-03 NOTE — DISCHARGE NOTE PROVIDER - HOSPITAL COURSE
73 y/o female PMHx FREDI, obesity hypoventilation syndrome, DM2, HLD, LE lymphedema, on CPAP at night recently noncompliant, presents w symptomatic anemia    S/P 1 prbc, now hemoglobin stable. Likely anemia of chronic disease. Occult blood negative. Chest X ray with fluid overload. Received Lasix x 1 in ED    then held due to LANE. seen by cardiology, to resume Lasix when Cr normal. Seen by pulmonary , ABG done shows  respiratory acidosis. patient refused to use C pap 73 y/o female PMHx FREDI, obesity hypoventilation syndrome, DM2, HLD, LE lymphedema, on CPAP at night recently noncompliant, presents w symptomatic anemia    S/P 1 prbc, now hemoglobin stable. Likely anemia of chronic disease. Occult blood negative. Chest X ray with fluid overload. Received Lasix x 1 in ED    then held due to LANE. seen by cardiology, to resume Lasix when Cr normal. Seen by pulmonary , ABG done shows  respiratory acidosis. patient refused to use C pap            patient seen and examined. agree with the above.     discussed with patient     medication reconciliation reviewed    30 minutes was spent coordinating care on day of discharge    please refer to progress note on day of DC 73 y/o female PMHx FREDI, obesity hypoventilation syndrome, DM2, HLD, LE lymphedema, on CPAP at night recently noncompliant, presents w symptomatic anemia    S/P 1 prbc, now hemoglobin stable.  Occult blood negative.  Chest X ray with fluid overload.  Received Lasix x 1 in ED, then held due to LANE.  Seen by cardiology and pulmonary.  ABG done - shows respiratory acidosis. 75 y/o female PMHx FREDI, obesity hypoventilation syndrome, DM2, HLD, LE lymphedema, on CPAP at night recently noncompliant, presents w symptomatic anemia    S/P 1 prbc, now hemoglobin stable.  Occult blood negative.  Chest X ray with fluid overload.  Received Lasix x 1 in ED, then held due to LANE.  Seen by cardiology and pulmonary.  ABG done - shows respiratory acidosis.      Bipap used as needed with improvement noted.  Patient refusing bone marrow biopsy.  CBC monitored.    CT chest shows bilateral pleural effusions and lower lobe passive atelectasis, and bilateral groundglass opacities.    Transthoracic echocardiogram:    1.Mitral annular calcification, otherwise normal mitral    valve. Minimal mitral regurgitation.    2. Aortic valve not well visualized; appears calcified.    Peak transaortic valve gradient equals 25 mm Hg, mean    transaortic valve gradient equals 15 mm Hg, estimated    aortic valve area equals 1.3 sqcm (by continuity equation),    aortic valve velocity time integral equals 50 cm,    consistent with moderate aortic stenosis. No aortic valve    regurgitation seen.    3. Endocardial visualization enhanced with intravenous    injection of Ultrasonic Enhancing Agent (Definity).    Hyperdynamic left ventricular systolic function. No left    ventricular thrombus.    4. Right ventricular enlargement with normal right    ventricular systolic function.    Respiratory status improving.    Patient stable for discharge with PMD, Cardiology, Pulmonology, and Hematology follow up. 75 y/o female PMHx FREDI, obesity hypoventilation syndrome, DM2, HLD, LE lymphedema, on CPAP at night recently noncompliant, presents w symptomatic anemia    S/P 1 prbc, now hemoglobin stable.  Occult blood negative.  Chest X ray with fluid overload.  Received Lasix x 1 in ED, then held due to LANE.  Seen by cardiology and pulmonary.  ABG done - shows respiratory acidosis.      Bipap used as needed with improvement noted.  Patient refusing bone marrow biopsy.  CBC monitored.    CT chest shows bilateral pleural effusions and lower lobe passive atelectasis, and bilateral groundglass opacities.    Patient received intermittant doses of Lasix, when her renal function would allow.    Transthoracic echocardiogram:    1.Mitral annular calcification, otherwise normal mitral    valve. Minimal mitral regurgitation.    2. Aortic valve not well visualized; appears calcified.    Peak transaortic valve gradient equals 25 mm Hg, mean    transaortic valve gradient equals 15 mm Hg, estimated    aortic valve area equals 1.3 sqcm (by continuity equation),    aortic valve velocity time integral equals 50 cm,    consistent with moderate aortic stenosis. No aortic valve    regurgitation seen.    3. Endocardial visualization enhanced with intravenous    injection of Ultrasonic Enhancing Agent (Definity).    Hyperdynamic left ventricular systolic function. No left    ventricular thrombus.    4. Right ventricular enlargement with normal right    ventricular systolic function.    Respiratory status improving.    Patient stable for discharge with PMD, Cardiology, Pulmonology, and Hematology follow up.

## 2020-07-03 NOTE — DISCHARGE NOTE PROVIDER - NSDCMRMEDTOKEN_GEN_ALL_CORE_FT
acetaminophen 325 mg oral tablet: 2 tab(s) orally every 6 hours, As needed, Mild and Moderate Pain (1 - 6)  allopurinol 300 mg oral tablet: 1 tab(s) orally once a day  aspirin 81 mg oral tablet, chewable: 1 tab(s) orally once a day  atorvastatin 10 mg oral tablet: 1 tab(s) orally once a day (at bedtime)  Calcium 600+D 600 mg-200 intl units oral tablet: 1 tab(s) orally once a day  cloNIDine 0.2 mg oral tablet: 1 tab(s) orally once a day  docusate sodium 100 mg oral capsule: 1 cap(s) orally 2 times a day, As Needed  doxazosin 4 mg oral tablet: 1 tab(s) orally once a day  ferrous sulfate 325 mg (65 mg elemental iron) oral tablet: 1 tab(s) orally once a day  Fish Oil oral capsule: 1 cap(s) orally once a day  furosemide 20 mg oral tablet: 1 tab(s) orally once a day  levothyroxine 25 mcg (0.025 mg) oral tablet: 1 tab(s) orally once a day  Lumigan 0.01% ophthalmic solution: 1 drop(s) to each affected eye once a day (in the evening)  metFORMIN 500 mg oral tablet, extended release: 1 tab(s) orally 3 times a day  Metoprolol Succinate ER 50 mg oral tablet, extended release: 1 tab(s) orally once a day (at bedtime)  multivitamin: 1 tab(s) orally once a day  nystatin 100,000 units/g topical powder: 1 application topically 2 times a day  Vitamin B12 1000 mcg oral tablet: 1 tab(s) orally once a day  Vitamin C 500 mg oral tablet: 1 tab(s) orally once a day acetaminophen 325 mg oral tablet: 2 tab(s) orally every 6 hours, As needed, Mild and Moderate Pain (1 - 6)  allopurinol 300 mg oral tablet: 1 tab(s) orally once a day  aspirin 81 mg oral tablet, chewable: 1 tab(s) orally once a day  atorvastatin 10 mg oral tablet: 1 tab(s) orally once a day (at bedtime)  Calcium 600+D 600 mg-200 intl units oral tablet: 1 tab(s) orally once a day  cloNIDine 0.2 mg oral tablet: 1 tab(s) orally once a day  docusate sodium 100 mg oral capsule: 1 cap(s) orally 2 times a day, As Needed  doxazosin 4 mg oral tablet: 1 tab(s) orally once a day  ferrous sulfate 325 mg (65 mg elemental iron) oral tablet: 1 tab(s) orally once a day  Fish Oil oral capsule: 1 cap(s) orally once a day  furosemide 20 mg oral tablet: 1 tab(s) orally once a day  levothyroxine 25 mcg (0.025 mg) oral tablet: 1 tab(s) orally once a day  Lumigan 0.01% ophthalmic solution: 1 drop(s) to each affected eye once a day (in the evening)  metFORMIN 500 mg oral tablet, extended release: 1 tab(s) orally 3 times a day  Metoprolol Succinate ER 50 mg oral tablet, extended release: 1 tab(s) orally once a day (at bedtime)  multivitamin: 1 tab(s) orally once a day  nystatin 100,000 units/g topical powder: 1 application topically 2 times a day  [facility]    E66.01  Vitamin B12 1000 mcg oral tablet: 1 tab(s) orally once a day  Vitamin C 500 mg oral tablet: 1 tab(s) orally once a day acetaminophen 325 mg oral tablet: 2 tab(s) orally every 6 hours, As needed, Mild and Moderate Pain (1 - 6)  allopurinol 300 mg oral tablet: 1 tab(s) orally once a day  aspirin 81 mg oral tablet, chewable: 1 tab(s) orally once a day  atorvastatin 10 mg oral tablet: 1 tab(s) orally once a day (at bedtime)  Calcium 600+D 600 mg-200 intl units oral tablet: 1 tab(s) orally once a day  cloNIDine 0.2 mg oral tablet: 1 tab(s) orally once a day (at bedtime)  docusate sodium 100 mg oral capsule: 1 cap(s) orally 2 times a day, As Needed for constipation  doxazosin 4 mg oral tablet: 1 tab(s) orally once a day  ferrous sulfate 325 mg (65 mg elemental iron) oral tablet: 1 tab(s) orally once a day  Fish Oil oral capsule: 1 cap(s) orally once a day  furosemide 20 mg oral tablet: 1 tab(s) orally every other day  levothyroxine 25 mcg (0.025 mg) oral tablet: 1 tab(s) orally once a day  Lumigan 0.01% ophthalmic solution: 1 drop(s) to each affected eye once a day (in the evening)  metFORMIN 500 mg oral tablet, extended release: 1 tab(s) orally 3 times a day  Metoprolol Succinate ER 50 mg oral tablet, extended release: 1 tab(s) orally once a day (at bedtime)  multivitamin: 1 tab(s) orally once a day  [facility]    E66.01  Vitamin B12 1000 mcg oral tablet: 1 tab(s) orally once a day  Vitamin C 500 mg oral tablet: 1 tab(s) orally once a day

## 2020-07-03 NOTE — DISCHARGE NOTE PROVIDER - CARE PROVIDERS DIRECT ADDRESSES
[email or web address]

## 2020-07-03 NOTE — CONSULT NOTE ADULT - SUBJECTIVE AND OBJECTIVE BOX
74yr old f with a pmhx of FREDI on CPAP, Obesity hypoventilation syndrome, Chronic use of 3L of home continuous home oxygen, diabetes, hypertension, chronic lymphedema and iron deficiency anemia told to come to ED by PCP for low hemoglobin (7.6). Patient reports how in february of this year she was hospitalized and intubated for respiratory failure and discharged to rehab. She has gradually been tapered down to 3L of continuous oxygen at home now. She reports that is chronically short of breath but feels it is more related to her morbid obesity and deconditioned overall state. She is now dependant on a walker and cane to mobilize around the home and is unable to leave the home due to stairs. Patient notes she increased shortness of breath is notable with exertion and she fears the need for returning to the hospital. She spoke to her PCP and asked for blood work to be done approximately 3 weeks ago. At that time her hemoglobin reportedly dropped to the mid 8 and so she independently began to double up on her ferrous sulfate supplements. Recently she had the PCP follow up on the blood work in hopes of improvement only to find the hemoglobin had worsened to 7.6 and she was sent to the Emergency room for further workup. The ambulance came and brought her into the hospital however per the patient she denies any fever, headache, dizziness, nausea, nbnb emesis, chest pain, abdominal pain, changes in BM or urinary discomfort. (02 Jul 2020 02:15)  -----------------  Pt seen and examined  Reports chronic anemia. No overt bleeding  No digestive symptoms  Last colonoscopy 2-3 years ago w Dr Shant Romero  States she does not want an EGD    PAST MEDICAL & SURGICAL HISTORY:  Obesity hypoventilation syndrome  Obesity, morbid  FREDI on CPAP  Ventral hernia  HLD (hyperlipidemia)  Diabetes mellitus type 2, controlled  Hypertension  S/P hernia repair: ventral hernia repair Jan 2016  S/P hernia repair: First ventral hernia repair in 1996 at [facility], Second ventral hernia repair with mesh at [facility], [place], operated by [facility] in 2007  S/P hysterectomy      MEDICATIONS  (STANDING):  allopurinol 300 milliGRAM(s) Oral daily  aspirin  chewable 81 milliGRAM(s) Oral daily  atorvastatin 10 milliGRAM(s) Oral at bedtime  chlorhexidine 2% Cloths 1 Application(s) Topical daily  cloNIDine 0.2 milliGRAM(s) Oral at bedtime  dextrose 5%. 1000 milliLiter(s) (50 mL/Hr) IV Continuous <Continuous>  dextrose 50% Injectable 12.5 Gram(s) IV Push once  dextrose 50% Injectable 25 Gram(s) IV Push once  dextrose 50% Injectable 25 Gram(s) IV Push once  doxazosin 4 milliGRAM(s) Oral at bedtime  enoxaparin Injectable 40 milliGRAM(s) SubCutaneous daily  insulin lispro (HumaLOG) corrective regimen sliding scale   SubCutaneous three times a day before meals  insulin lispro (HumaLOG) corrective regimen sliding scale   SubCutaneous at bedtime  latanoprost 0.005% Ophthalmic Solution 1 Drop(s) Both EYES at bedtime  levothyroxine 25 MICROGram(s) Oral daily  metoprolol succinate ER 50 milliGRAM(s) Oral daily    MEDICATIONS  (PRN):  acetaminophen   Tablet .. 650 milliGRAM(s) Oral every 6 hours PRN Temp greater or equal to 38C (100.4F), Moderate Pain (4 - 6)  dextrose 40% Gel 15 Gram(s) Oral once PRN Blood Glucose LESS THAN 70 milliGRAM(s)/deciliter  glucagon  Injectable 1 milliGRAM(s) IntraMuscular once PRN Glucose LESS THAN 70 milligrams/deciliter      Allergies    codeine (Rash)    Intolerances        Review of Systems:    General:  No wt loss, fevers, chills, night sweats,fatigue,   CV:  No pain, palpitatioins, hypo/hypertension  Resp:  +dyspnea, cough, tachypnea, wheezing  GI:  see hpi  :  No pain, bleeding, incontinence, nocturia  Muscle:  No pain, weakness  Neuro:  + generalized weakness, tingling, memory problems  Psych:  No fatigue, insomnia, mood problems, depression  Endocrine:  No polyuria, polydypsia, cold/heat intolerance  Heme:  No petechiae, ecchymosis, easy bruisability  Skin:  No rash, tattoos, scars, edema      Vital Signs Last 24 Hrs  T(C): 36.4 (03 Jul 2020 08:24), Max: 36.7 (03 Jul 2020 00:00)  T(F): 97.6 (03 Jul 2020 08:24), Max: 98 (03 Jul 2020 00:00)  HR: 79 (03 Jul 2020 08:24) (76 - 80)  BP: 105/63 (03 Jul 2020 08:24) (105/63 - 125/70)  BP(mean): --  RR: 20 (03 Jul 2020 08:24) (20 - 21)  SpO2: 93% (03 Jul 2020 08:24) (93% - 97%)    PHYSICAL EXAM:    Constitutional: NAD, obese  Neck: No LAD, supple  Respiratory: +BS  Cardiovascular: S1 and S2  Gastrointestinal: BS+, soft, NT/ND, neg HSM,  Extremities: + severe lower extremity lymphedema, neg clubing, cyanosis  Vascular: 2+ peripheral pulses  Neurological: A/O x 3, no focal deficits  Psychiatric: Normal mood, normal affect  Skin: No rashes      LABS:                        8.3    7.54  )-----------( 166      ( 03 Jul 2020 06:11 )             28.7                         8.1    8.97  )-----------( 185      ( 02 Jul 2020 17:57 )             27.8                         7.6    8.46  )-----------( 170      ( 02 Jul 2020 06:51 )             25.9     07-03    147<H>  |  106  |  36<H>  ----------------------------<  119<H>  4.8   |  34<H>  |  1.47<H>    Ca    9.4      03 Jul 2020 06:11    TPro  6.8  /  Alb  3.4  /  TBili  0.5  /  DBili  x   /  AST  11  /  ALT  12  /  AlkPhos  72  07-03    PT/INR - ( 01 Jul 2020 19:47 )   PT: 15.6 sec;   INR: 1.39 ratio         PTT - ( 01 Jul 2020 19:47 )  PTT:32.5 sec    LIVER FUNCTIONS - ( 03 Jul 2020 06:11 )  Alb: 3.4 g/dL / Pro: 6.8 g/dL / ALK PHOS: 72 U/L / ALT: 12 U/L / AST: 11 U/L / GGT: x         LIVER FUNCTIONS - ( 02 Jul 2020 06:51 )  Alb: 3.2 g/dL / Pro: 6.5 g/dL / ALK PHOS: 68 U/L / ALT: 11 U/L / AST: 13 U/L / GGT: x         LIVER FUNCTIONS - ( 01 Jul 2020 19:47 )  Alb: 3.6 g/dL / Pro: 7.2 g/dL / ALK PHOS: 78 U/L / ALT: 12 U/L / AST: 9 U/L / GGT: x                 RADIOLOGY & ADDITIONAL TESTS:

## 2020-07-03 NOTE — PROGRESS NOTE ADULT - SUBJECTIVE AND OBJECTIVE BOX
Patient is a 74y old  Female who presents with a chief complaint of low blood count (03 Jul 2020 15:40)      INTERVAL HPI/OVERNIGHT EVENTS: noted  pt seen and examined      Vital Signs Last 24 Hrs  T(C): 36.9 (03 Jul 2020 16:16), Max: 36.9 (03 Jul 2020 16:16)  T(F): 98.4 (03 Jul 2020 16:16), Max: 98.4 (03 Jul 2020 16:16)  HR: 80 (03 Jul 2020 18:07) (76 - 84)  BP: 136/63 (03 Jul 2020 16:16) (105/63 - 136/63)  BP(mean): --  RR: 20 (03 Jul 2020 16:16) (20 - 21)  SpO2: 92% (03 Jul 2020 18:07) (92% - 97%)    acetaminophen   Tablet .. 650 milliGRAM(s) Oral every 6 hours PRN  allopurinol 300 milliGRAM(s) Oral daily  aspirin  chewable 81 milliGRAM(s) Oral daily  atorvastatin 10 milliGRAM(s) Oral at bedtime  chlorhexidine 2% Cloths 1 Application(s) Topical daily  cloNIDine 0.2 milliGRAM(s) Oral at bedtime  dextrose 40% Gel 15 Gram(s) Oral once PRN  dextrose 5%. 1000 milliLiter(s) IV Continuous <Continuous>  dextrose 50% Injectable 12.5 Gram(s) IV Push once  dextrose 50% Injectable 25 Gram(s) IV Push once  dextrose 50% Injectable 25 Gram(s) IV Push once  doxazosin 4 milliGRAM(s) Oral at bedtime  enoxaparin Injectable 40 milliGRAM(s) SubCutaneous daily  glucagon  Injectable 1 milliGRAM(s) IntraMuscular once PRN  insulin lispro (HumaLOG) corrective regimen sliding scale   SubCutaneous three times a day before meals  insulin lispro (HumaLOG) corrective regimen sliding scale   SubCutaneous at bedtime  latanoprost 0.005% Ophthalmic Solution 1 Drop(s) Both EYES at bedtime  levothyroxine 25 MICROGram(s) Oral daily  metoprolol succinate ER 50 milliGRAM(s) Oral daily      PHYSICAL EXAM:  GENERAL: NAD,   EYES: conjunctiva and sclera clear  ENMT: Moist mucous membranes  NECK: Supple, No JVD, Normal thyroid  CHEST/LUNG: non labored, cta b/l  HEART: Regular rate and rhythm; No murmurs, rubs, or gallops  ABDOMEN: Soft, Nontender, Nondistended; Bowel sounds present  EXTREMITIES:  2+ Peripheral Pulses, No clubbing, cyanosis, or edema  LYMPH: No lymphadenopathy noted  SKIN: No rashes or lesions    Consultant(s) Notes Reviewed:  [x ] YES  [ ] NO  Care Discussed with Consultants/Other Providers [ x] YES  [ ] NO    LABS:                        8.3    7.54  )-----------( 166      ( 03 Jul 2020 06:11 )             28.7     07-03    147<H>  |  106  |  36<H>  ----------------------------<  119<H>  4.8   |  34<H>  |  1.47<H>    Ca    9.4      03 Jul 2020 06:11    TPro  6.8  /  Alb  3.4  /  TBili  0.5  /  DBili  x   /  AST  11  /  ALT  12  /  AlkPhos  72  07-03    PT/INR - ( 01 Jul 2020 19:47 )   PT: 15.6 sec;   INR: 1.39 ratio         PTT - ( 01 Jul 2020 19:47 )  PTT:32.5 sec    CAPILLARY BLOOD GLUCOSE      POCT Blood Glucose.: 156 mg/dL (03 Jul 2020 17:09)  POCT Blood Glucose.: 139 mg/dL (03 Jul 2020 12:20)  POCT Blood Glucose.: 122 mg/dL (03 Jul 2020 08:16)  POCT Blood Glucose.: 160 mg/dL (02 Jul 2020 21:34)      ABG - ( 03 Jul 2020 17:46 )  pH, Arterial: 7.28  pH, Blood: x     /  pCO2: 80    /  pO2: 79    / HCO3: 37    / Base Excess: 8.9   /  SaO2: 95                      RADIOLOGY & ADDITIONAL TESTS:    Imaging Personally Reviewed:  [x ] YES  [ ] NO

## 2020-07-03 NOTE — CONSULT NOTE ADULT - PROBLEM SELECTOR RECOMMENDATION 3
-  -s/p 1u pRBC.   -treatment as per primary team.    patient follows with dr leblanc ([facility] cardiology).    Abhilash Spann D.O.  [phone]

## 2020-07-03 NOTE — DISCHARGE NOTE PROVIDER - NSDCFUADDAPPT_GEN_ALL_CORE_FT
You will need to follow up with your hematologist, Dr. Gutierrez, within one week of discharge- please call to make an appointment.  At this appointment, you will need a CBC drawn to monitor your hemoglobin/hematocrit and discuss plans for a bone marrow biopsy.    You will need to follow up with your pulmonologist, Dr. Sullivan, within one week of discharge - please call to make an appointment.    You will need to follow up with your cardiologist, Dr. Spann, within one week of discharge - please call to make an appointment.

## 2020-07-03 NOTE — DISCHARGE NOTE PROVIDER - CARE PROVIDER_API CALL
Fuad Gutierrez  [facility]  [address] [place] [address]  Phone: [phone]  Fax: [phone]  Follow Up Time:     Abhilash Spann (DO)  Internal Medicine  [address] [place] [address], [facility] [address]  [place] [address]  Phone: [phone]  Fax: [phone]  Follow Up Time: Fuad Gutierrez  [facility]  [address] [place] [address]  Phone: [phone]  Fax: [phone]  Follow Up Time:     Abhilash Spann (DO)  Internal Medicine  [address] [place] [address], [facility] [address]  [place] [address]  Phone: [phone]  Fax: [phone]  Follow Up Time:

## 2020-07-03 NOTE — CONSULT NOTE ADULT - SUBJECTIVE AND OBJECTIVE BOX
[facility] Cardiology Consult  _________________________    Patient is a 74y old  Female who presents with a chief complaint of low blood count (02 Jul 2020 02:15)      HPI:  74yr old f with a pmhx of FREDI on CPAP, Obesity hypoventilation syndrome, Chronic use of 3L of home continuous home oxygen, diabetes, hypertension, chronic lymphedema and iron deficiency anemia told to come to ED by PCP for low hemoglobin (7.6). Patient reports how in february of this year she was hospitalized and intubated for respiratory failure and discharged to rehab. She has gradually been tapered down to 3L of continuous oxygen at home now. She reports that is chronically short of breath but feels it is more related to her morbid obesity and deconditioned overall state. She is now dependant on a walker and cane to mobilize around the home and is unable to leave the home due to stairs. Patient notes she increased shortness of breath is notable with exertion and she fears the need for returning to the hospital. She spoke to her PCP and asked for blood work to be done approximately 3 weeks ago. At that time her hemoglobin reportedly dropped to the mid 8 and so she independently began to double up on her ferrous sulfate supplements. Recently she had the PCP follow up on the blood work in hopes of improvement only to find the hemoglobin had worsened to 7.6 and she was sent to the Emergency room for further workup. The ambulance came and brought her into the hospital however per the patient she denies any fever, headache, dizziness, nausea, nbnb emesis, chest pain, abdominal pain, changes in BM or urinary discomfort.     admitted for anemia.  found to have pleural effusions on imaging.  given lasix 40 mg iv.      PAST MEDICAL & SURGICAL HISTORY:  Obesity hypoventilation syndrome  Obesity, morbid  FREDI on CPAP  Ventral hernia  HLD (hyperlipidemia)  Diabetes mellitus type 2, controlled  Hypertension  S/P hernia repair: ventral hernia repair Jan 2016  S/P hernia repair: First ventral hernia repair in 1996 at [facility], Second ventral hernia repair with mesh at [facility] in 2007  S/P hysterectomy      MEDICATIONS  (STANDING):  allopurinol 300 milliGRAM(s) Oral daily  aspirin  chewable 81 milliGRAM(s) Oral daily  atorvastatin 10 milliGRAM(s) Oral at bedtime  chlorhexidine 2% Cloths 1 Application(s) Topical daily  cloNIDine 0.2 milliGRAM(s) Oral at bedtime  dextrose 5%. 1000 milliLiter(s) (50 mL/Hr) IV Continuous <Continuous>  dextrose 50% Injectable 12.5 Gram(s) IV Push once  dextrose 50% Injectable 25 Gram(s) IV Push once  dextrose 50% Injectable 25 Gram(s) IV Push once  doxazosin 4 milliGRAM(s) Oral at bedtime  enoxaparin Injectable 40 milliGRAM(s) SubCutaneous daily  insulin lispro (HumaLOG) corrective regimen sliding scale   SubCutaneous three times a day before meals  insulin lispro (HumaLOG) corrective regimen sliding scale   SubCutaneous at bedtime  latanoprost 0.005% Ophthalmic Solution 1 Drop(s) Both EYES at bedtime  levothyroxine 25 MICROGram(s) Oral daily  metoprolol succinate ER 50 milliGRAM(s) Oral daily    MEDICATIONS  (PRN):  acetaminophen   Tablet .. 650 milliGRAM(s) Oral every 6 hours PRN Temp greater or equal to 38C (100.4F), Moderate Pain (4 - 6)  dextrose 40% Gel 15 Gram(s) Oral once PRN Blood Glucose LESS THAN 70 milliGRAM(s)/deciliter  glucagon  Injectable 1 milliGRAM(s) IntraMuscular once PRN Glucose LESS THAN 70 milligrams/deciliter      Allergies    codeine (Rash)    Intolerances        Social Histroy: Tobacco- , ETOH-, Illicit Drugs-    T(C): 36.4 (07-03-20 @ 08:24), Max: 37.1 (07-02-20 @ 15:39)  HR: 79 (07-03-20 @ 08:24) (76 - 93)  BP: 105/63 (07-03-20 @ 08:24) (105/63 - 143/73)  RR: 20 (07-03-20 @ 08:24) (20 - 21)  SpO2: 93% (07-03-20 @ 08:24) (93% - 97%)  I&O's Summary    02 Jul 2020 07:01  -  03 Jul 2020 07:00  --------------------------------------------------------  IN: 240 mL / OUT: 1550 mL / NET: -1310 mL        Review of Systems:  Constitutional: [ ] Fever [ ] Chills [ ] Fatigue [ ] Weight change   HEENT: [ ] Blurred vision [ ] Eye Pain [ ] Headache [ ] Runny nose [ ] Sore Throat   Respiratory: [ ] Cough [ ] Wheezing [x ] Shortness of breath  Cardiovascular: [ ] Chest Pain [ ] Palpitations [ ] CALI [ ] PND [ ] Orthopnea  Gastrointestinal: [ ] Abdominal Pain [ ] Diarrhea [ ] Constipation [ ] Hemorrhoids [ ] Nausea [ ] Vomiting  Genitourinary: [ ] Nocturia [ ] Dysuria [ ] Incontinence  Extremities: [ x] Swelling [ ] Joint Pain  Neurologic: [ ] Focal deficit [ ] Paresthesias [ ] Syncope  Lymphatic: [ ] Swelling [ ] Lymphadenopathy   Skin: [ ] Rash [ ] Ecchymoses [ ] Wounds [ ] Lesions  Psychiatry: [ ] Depression [ ] Suicidal/Homicidal Ideation [ ] Anxiety [ ] Sleep Disturbances  [x ] 10 point review of systems is otherwise negative except as mentioned above            [ ]Unable to obtain    PHYSICAL EXAM:  GENERAL: Alert, NAD  NECK: Supple  CHEST/LUNG: Clear to anterior auscultation bilaterally; No significant wheezes, rales, or rhonchi  HEART: S1 S2 normal, RRR,  No murmurs, rubs, or gallops  ABDOMEN: Soft, Nondistended  EXTREMITIES:  +2 LE edema.      LABS:                        8.3    7.54  )-----------( 166      ( 03 Jul 2020 06:11 )             28.7     07-03    147<H>  |  106  |  36<H>  ----------------------------<  119<H>  4.8   |  34<H>  |  1.47<H>    Ca    9.4      03 Jul 2020 06:11    TPro  6.8  /  Alb  3.4  /  TBili  0.5  /  DBili  x   /  AST  11  /  ALT  12  /  AlkPhos  72  07-03    PT/INR - ( 01 Jul 2020 19:47 )   PT: 15.6 sec;   INR: 1.39 ratio         PTT - ( 01 Jul 2020 19:47 )  PTT:32.5 sec              MEDICATIONS  (STANDING):  allopurinol 300 milliGRAM(s) Oral daily  aspirin  chewable 81 milliGRAM(s) Oral daily  atorvastatin 10 milliGRAM(s) Oral at bedtime  chlorhexidine 2% Cloths 1 Application(s) Topical daily  cloNIDine 0.2 milliGRAM(s) Oral at bedtime  dextrose 5%. 1000 milliLiter(s) (50 mL/Hr) IV Continuous <Continuous>  dextrose 50% Injectable 12.5 Gram(s) IV Push once  dextrose 50% Injectable 25 Gram(s) IV Push once  dextrose 50% Injectable 25 Gram(s) IV Push once  doxazosin 4 milliGRAM(s) Oral at bedtime  enoxaparin Injectable 40 milliGRAM(s) SubCutaneous daily  insulin lispro (HumaLOG) corrective regimen sliding scale   SubCutaneous three times a day before meals  insulin lispro (HumaLOG) corrective regimen sliding scale   SubCutaneous at bedtime  latanoprost 0.005% Ophthalmic Solution 1 Drop(s) Both EYES at bedtime  levothyroxine 25 MICROGram(s) Oral daily  metoprolol succinate ER 50 milliGRAM(s) Oral daily    MEDICATIONS  (PRN):  acetaminophen   Tablet .. 650 milliGRAM(s) Oral every 6 hours PRN Temp greater or equal to 38C (100.4F), Moderate Pain (4 - 6)  dextrose 40% Gel 15 Gram(s) Oral once PRN Blood Glucose LESS THAN 70 milliGRAM(s)/deciliter  glucagon  Injectable 1 milliGRAM(s) IntraMuscular once PRN Glucose LESS THAN 70 milligrams/deciliter          RADIOLOGY & ADDITIONAL TESTS:    Cardiology testing:  EKG: sinus tach 109 bpm

## 2020-07-03 NOTE — CONSULT NOTE ADULT - PROBLEM SELECTOR RECOMMENDATION 9
-fluid overloaded by exam.  -likely heart failure with preserved ejection fraction  -s/p lasix 40 mg iv in ed. creatinine increased.  -hold lasix for today. resume 40 mg iv tomorrow given elevated creatinine  -daily weights  -i/os.  -wean o2  -tte as above

## 2020-07-04 LAB
ALBUMIN SERPL ELPH-MCNC: 3.2 G/DL — LOW (ref 3.3–5)
ALP SERPL-CCNC: 68 U/L — SIGNIFICANT CHANGE UP (ref 40–120)
ALT FLD-CCNC: 9 U/L — LOW (ref 10–45)
ANION GAP SERPL CALC-SCNC: 7 MMOL/L — SIGNIFICANT CHANGE UP (ref 5–17)
AST SERPL-CCNC: 10 U/L — SIGNIFICANT CHANGE UP (ref 10–40)
BASE EXCESS BLDA CALC-SCNC: 10.4 MMOL/L — HIGH (ref -2–2)
BILIRUB SERPL-MCNC: 0.5 MG/DL — SIGNIFICANT CHANGE UP (ref 0.2–1.2)
BUN SERPL-MCNC: 40 MG/DL — HIGH (ref 7–23)
CALCIUM SERPL-MCNC: 9.3 MG/DL — SIGNIFICANT CHANGE UP (ref 8.4–10.5)
CHLORIDE SERPL-SCNC: 106 MMOL/L — SIGNIFICANT CHANGE UP (ref 96–108)
CO2 BLDA-SCNC: 39 MMOL/L — HIGH (ref 22–30)
CO2 SERPL-SCNC: 34 MMOL/L — HIGH (ref 22–31)
CREAT SERPL-MCNC: 1.45 MG/DL — HIGH (ref 0.5–1.3)
FERRITIN SERPL-MCNC: 131 NG/ML — SIGNIFICANT CHANGE UP (ref 15–150)
FOLATE SERPL-MCNC: >20 NG/ML — SIGNIFICANT CHANGE UP
GLUCOSE BLDC GLUCOMTR-MCNC: 133 MG/DL — HIGH (ref 70–99)
GLUCOSE BLDC GLUCOMTR-MCNC: 152 MG/DL — HIGH (ref 70–99)
GLUCOSE BLDC GLUCOMTR-MCNC: 157 MG/DL — HIGH (ref 70–99)
GLUCOSE BLDC GLUCOMTR-MCNC: 160 MG/DL — HIGH (ref 70–99)
GLUCOSE BLDC GLUCOMTR-MCNC: 167 MG/DL — HIGH (ref 70–99)
GLUCOSE SERPL-MCNC: 118 MG/DL — HIGH (ref 70–99)
HCO3 BLDA-SCNC: 37 MMOL/L — HIGH (ref 21–29)
HCT VFR BLD CALC: 26.2 % — LOW (ref 34.5–45)
HGB BLD-MCNC: 7.4 G/DL — LOW (ref 11.5–15.5)
IRON SATN MFR SERPL: 14 % — SIGNIFICANT CHANGE UP (ref 14–50)
IRON SATN MFR SERPL: 29 UG/DL — LOW (ref 30–160)
MCHC RBC-ENTMCNC: 28.2 GM/DL — LOW (ref 32–36)
MCHC RBC-ENTMCNC: 31.5 PG — SIGNIFICANT CHANGE UP (ref 27–34)
MCV RBC AUTO: 111.5 FL — HIGH (ref 80–100)
NRBC # BLD: 0 /100 WBCS — SIGNIFICANT CHANGE UP (ref 0–0)
PCO2 BLDA: 68 MMHG — HIGH (ref 32–46)
PH BLDA: 7.36 — SIGNIFICANT CHANGE UP (ref 7.35–7.45)
PLATELET # BLD AUTO: 152 K/UL — SIGNIFICANT CHANGE UP (ref 150–400)
PO2 BLDA: 50 MMHG — CRITICAL LOW (ref 74–108)
POTASSIUM SERPL-MCNC: 4.8 MMOL/L — SIGNIFICANT CHANGE UP (ref 3.5–5.3)
POTASSIUM SERPL-SCNC: 4.8 MMOL/L — SIGNIFICANT CHANGE UP (ref 3.5–5.3)
PROT SERPL-MCNC: 6.3 G/DL — SIGNIFICANT CHANGE UP (ref 6–8.3)
RBC # BLD: 2.35 M/UL — LOW (ref 3.8–5.2)
RBC # FLD: 16.7 % — HIGH (ref 10.3–14.5)
SAO2 % BLDA: 85 % — LOW (ref 92–96)
SODIUM SERPL-SCNC: 147 MMOL/L — HIGH (ref 135–145)
TIBC SERPL-MCNC: 211 UG/DL — LOW (ref 220–430)
UIBC SERPL-MCNC: 182 UG/DL — SIGNIFICANT CHANGE UP (ref 110–370)
VIT B12 SERPL-MCNC: 1371 PG/ML — HIGH (ref 232–1245)
WBC # BLD: 8.59 K/UL — SIGNIFICANT CHANGE UP (ref 3.8–10.5)
WBC # FLD AUTO: 8.59 K/UL — SIGNIFICANT CHANGE UP (ref 3.8–10.5)

## 2020-07-04 RX ORDER — SENNA PLUS 8.6 MG/1
2 TABLET ORAL AT BEDTIME
Refills: 0 | Status: DISCONTINUED | OUTPATIENT
Start: 2020-07-04 | End: 2020-07-14

## 2020-07-04 RX ORDER — BIMATOPROST 0.3 MG/ML
1 SOLUTION/ DROPS OPHTHALMIC AT BEDTIME
Refills: 0 | Status: DISCONTINUED | OUTPATIENT
Start: 2020-07-04 | End: 2020-07-14

## 2020-07-04 RX ADMIN — Medication 0.2 MILLIGRAM(S): at 21:23

## 2020-07-04 RX ADMIN — Medication 50 MILLIGRAM(S): at 06:32

## 2020-07-04 RX ADMIN — SENNA PLUS 2 TABLET(S): 8.6 TABLET ORAL at 21:23

## 2020-07-04 RX ADMIN — BIMATOPROST 1 DROP(S): 0.3 SOLUTION/ DROPS OPHTHALMIC at 21:14

## 2020-07-04 RX ADMIN — ATORVASTATIN CALCIUM 10 MILLIGRAM(S): 80 TABLET, FILM COATED ORAL at 21:23

## 2020-07-04 RX ADMIN — Medication 4 MILLIGRAM(S): at 21:23

## 2020-07-04 RX ADMIN — CHLORHEXIDINE GLUCONATE 1 APPLICATION(S): 213 SOLUTION TOPICAL at 13:58

## 2020-07-04 RX ADMIN — ENOXAPARIN SODIUM 40 MILLIGRAM(S): 100 INJECTION SUBCUTANEOUS at 13:56

## 2020-07-04 RX ADMIN — Medication 81 MILLIGRAM(S): at 13:56

## 2020-07-04 RX ADMIN — Medication 1: at 09:58

## 2020-07-04 RX ADMIN — Medication 1: at 13:56

## 2020-07-04 RX ADMIN — Medication 300 MILLIGRAM(S): at 13:56

## 2020-07-04 RX ADMIN — Medication 25 MICROGRAM(S): at 06:32

## 2020-07-04 NOTE — PHYSICAL THERAPY INITIAL EVALUATION ADULT - CRITERIA FOR SKILLED THERAPEUTIC INTERVENTIONS
predicted duration of therapy intervention/impairments found/therapy frequency/rehab potential/anticipated discharge recommendation/anticipated equipment needs at discharge/risk reduction/prevention/functional limitations in following categories

## 2020-07-04 NOTE — PROGRESS NOTE ADULT - SUBJECTIVE AND OBJECTIVE BOX
Cardiology Dr. Rodrigez [phone]    SUBJECTIVE / OVERNIGHT EVENTS:    Patient seen and examined, no events overnight    T(C): 37.3 (07-04-20 @ 01:45), Max: 37.3 (07-04-20 @ 01:45)  HR: 77 (07-04-20 @ 06:21) (76 - 84)  BP: 137/74 (07-04-20 @ 01:45) (105/63 - 137/74)  RR: 19 (07-04-20 @ 06:21) (19 - 20)  SpO2: 94% (07-04-20 @ 06:21) (92% - 99%)  Wt(kg): --  Daily     Daily   I&O's Summary    03 Jul 2020 07:01  -  04 Jul 2020 07:00  --------------------------------------------------------  IN: 0 mL / OUT: 1850 mL / NET: -1850 mL        Telemetry:    PHYSICAL EXAM:  GENERAL: A+Ox3, dyspneic with talking  HEAD:  Atraumatic, Normocephalic  NECK: Supple, No JVD  CHEST/LUNG: Clear to auscultation anteriorly  HEART: S1S2, RR, No murmurs, rubs, or gallops  ABDOMEN: Soft, Nontender, Nondistended; Bowel sounds present  EXTREMITIES: lymphedema  NEUROLOGY: non-focal      LABS:                        7.4    8.59  )-----------( 152      ( 04 Jul 2020 07:04 )             26.2     07-04    147<H>  |  106  |  40<H>  ----------------------------<  118<H>  4.8   |  34<H>  |  1.45<H>    Ca    9.3      04 Jul 2020 07:04    TPro  6.3  /  Alb  3.2<L>  /  TBili  0.5  /  DBili  x   /  AST  10  /  ALT  9<L>  /  AlkPhos  68  07-04              MEDICATIONS  (STANDING):  allopurinol 300 milliGRAM(s) Oral daily  aspirin  chewable 81 milliGRAM(s) Oral daily  atorvastatin 10 milliGRAM(s) Oral at bedtime  chlorhexidine 2% Cloths 1 Application(s) Topical daily  cloNIDine 0.2 milliGRAM(s) Oral at bedtime  dextrose 5%. 1000 milliLiter(s) (50 mL/Hr) IV Continuous <Continuous>  dextrose 50% Injectable 12.5 Gram(s) IV Push once  dextrose 50% Injectable 25 Gram(s) IV Push once  dextrose 50% Injectable 25 Gram(s) IV Push once  doxazosin 4 milliGRAM(s) Oral at bedtime  enoxaparin Injectable 40 milliGRAM(s) SubCutaneous daily  insulin lispro (HumaLOG) corrective regimen sliding scale   SubCutaneous three times a day before meals  insulin lispro (HumaLOG) corrective regimen sliding scale   SubCutaneous at bedtime  latanoprost 0.005% Ophthalmic Solution 1 Drop(s) Both EYES at bedtime  levothyroxine 25 MICROGram(s) Oral daily  metoprolol succinate ER 50 milliGRAM(s) Oral daily    MEDICATIONS  (PRN):  acetaminophen   Tablet .. 650 milliGRAM(s) Oral every 6 hours PRN Temp greater or equal to 38C (100.4F), Moderate Pain (4 - 6)  dextrose 40% Gel 15 Gram(s) Oral once PRN Blood Glucose LESS THAN 70 milliGRAM(s)/deciliter  glucagon  Injectable 1 milliGRAM(s) IntraMuscular once PRN Glucose LESS THAN 70 milligrams/deciliter      RADIOLOGY & ADDITIONAL TESTS:

## 2020-07-04 NOTE — PHYSICAL THERAPY INITIAL EVALUATION ADULT - PERTINENT HX OF CURRENT PROBLEM, REHAB EVAL
74yr old f with a pmhx of FREDI on CPAP, Obesity hypoventilation syndrome, Chronic use of 3L of home continuous home oxygen, diabetes, hypertension, chronic lymphedema and iron deficiency anemia told to come to ED by PCP for low hemoglobin (7.6).

## 2020-07-04 NOTE — PHYSICAL THERAPY INITIAL EVALUATION ADULT - ADDITIONAL COMMENTS
Pt lives with her  in an apt w/ 7 steps to neg. Pt amb w/ RW and SC PTA.  and HHA ([id number]) assisted w/ ADL's.

## 2020-07-04 NOTE — PROGRESS NOTE ADULT - SUBJECTIVE AND OBJECTIVE BOX
Patient is a 74y old  Female who presents with a chief complaint of low blood count (04 Jul 2020 08:10)      INTERVAL HPI/OVERNIGHT EVENTS: noted  pt seen and examined  sp cpap at night, on high flow this am      Vital Signs Last 24 Hrs  T(C): 37.1 (04 Jul 2020 17:49), Max: 37.3 (04 Jul 2020 01:45)  T(F): 98.8 (04 Jul 2020 17:49), Max: 99.2 (04 Jul 2020 01:45)  HR: 86 (04 Jul 2020 17:49) (77 - 87)  BP: 113/57 (04 Jul 2020 17:49) (113/57 - 141/62)  BP(mean): --  RR: 20 (04 Jul 2020 17:49) (19 - 20)  SpO2: 91% (04 Jul 2020 17:49) (82% - 99%)    acetaminophen   Tablet .. 650 milliGRAM(s) Oral every 6 hours PRN  allopurinol 300 milliGRAM(s) Oral daily  aspirin  chewable 81 milliGRAM(s) Oral daily  atorvastatin 10 milliGRAM(s) Oral at bedtime  bimatoprost 0.01% Ophthalmic Solution 1 Drop(s) Both EYES at bedtime  chlorhexidine 2% Cloths 1 Application(s) Topical daily  cloNIDine 0.2 milliGRAM(s) Oral at bedtime  dextrose 40% Gel 15 Gram(s) Oral once PRN  dextrose 5%. 1000 milliLiter(s) IV Continuous <Continuous>  dextrose 50% Injectable 12.5 Gram(s) IV Push once  dextrose 50% Injectable 25 Gram(s) IV Push once  dextrose 50% Injectable 25 Gram(s) IV Push once  doxazosin 4 milliGRAM(s) Oral at bedtime  enoxaparin Injectable 40 milliGRAM(s) SubCutaneous daily  glucagon  Injectable 1 milliGRAM(s) IntraMuscular once PRN  insulin lispro (HumaLOG) corrective regimen sliding scale   SubCutaneous three times a day before meals  insulin lispro (HumaLOG) corrective regimen sliding scale   SubCutaneous at bedtime  levothyroxine 25 MICROGram(s) Oral daily  metoprolol succinate ER 50 milliGRAM(s) Oral daily  senna 2 Tablet(s) Oral at bedtime      PHYSICAL EXAM:  GENERAL: NAD, obesity  EYES: conjunctiva and sclera clear  ENMT: Moist mucous membranes  NECK: Supple, No JVD, Normal thyroid  CHEST/LUNG: non labored, cta b/l  HEART: Regular rate and rhythm; No murmurs, rubs, or gallops  ABDOMEN: Soft, Nontender, Nondistended; Bowel sounds present  EXTREMITIES:  lymphedema  LYMPH: No lymphadenopathy noted  SKIN: No rashes or lesions    Consultant(s) Notes Reviewed:  [x ] YES  [ ] NO  Care Discussed with Consultants/Other Providers [ x] YES  [ ] NO    LABS:                        7.4    8.59  )-----------( 152      ( 04 Jul 2020 07:04 )             26.2     07-04    147<H>  |  106  |  40<H>  ----------------------------<  118<H>  4.8   |  34<H>  |  1.45<H>    Ca    9.3      04 Jul 2020 07:04    TPro  6.3  /  Alb  3.2<L>  /  TBili  0.5  /  DBili  x   /  AST  10  /  ALT  9<L>  /  AlkPhos  68  07-04        CAPILLARY BLOOD GLUCOSE      POCT Blood Glucose.: 133 mg/dL (04 Jul 2020 18:55)  POCT Blood Glucose.: 152 mg/dL (04 Jul 2020 13:51)  POCT Blood Glucose.: 157 mg/dL (04 Jul 2020 12:20)  POCT Blood Glucose.: 160 mg/dL (04 Jul 2020 09:54)  POCT Blood Glucose.: 158 mg/dL (03 Jul 2020 21:44)      ABG - ( 04 Jul 2020 12:53 )  pH, Arterial: 7.36  pH, Blood: x     /  pCO2: 68    /  pO2: 50    / HCO3: 37    / Base Excess: 10.4  /  SaO2: 85                      RADIOLOGY & ADDITIONAL TESTS:    Imaging Personally Reviewed:  [x ] YES  [ ] NO

## 2020-07-05 LAB
ANION GAP SERPL CALC-SCNC: 6 MMOL/L — SIGNIFICANT CHANGE UP (ref 5–17)
BASE EXCESS BLDA CALC-SCNC: 8.8 MMOL/L — HIGH (ref -2–2)
BASE EXCESS BLDA CALC-SCNC: 9 MMOL/L — HIGH (ref -2–2)
BUN SERPL-MCNC: 42 MG/DL — HIGH (ref 7–23)
CALCIUM SERPL-MCNC: 9.5 MG/DL — SIGNIFICANT CHANGE UP (ref 8.4–10.5)
CHLORIDE SERPL-SCNC: 107 MMOL/L — SIGNIFICANT CHANGE UP (ref 96–108)
CO2 BLDA-SCNC: 40 MMOL/L — HIGH (ref 22–30)
CO2 BLDA-SCNC: 40 MMOL/L — HIGH (ref 22–30)
CO2 SERPL-SCNC: 34 MMOL/L — HIGH (ref 22–31)
CREAT SERPL-MCNC: 1.6 MG/DL — HIGH (ref 0.5–1.3)
GAS PNL BLDA: SIGNIFICANT CHANGE UP
GAS PNL BLDA: SIGNIFICANT CHANGE UP
GLUCOSE BLDC GLUCOMTR-MCNC: 136 MG/DL — HIGH (ref 70–99)
GLUCOSE BLDC GLUCOMTR-MCNC: 142 MG/DL — HIGH (ref 70–99)
GLUCOSE BLDC GLUCOMTR-MCNC: 166 MG/DL — HIGH (ref 70–99)
GLUCOSE BLDC GLUCOMTR-MCNC: 172 MG/DL — HIGH (ref 70–99)
GLUCOSE SERPL-MCNC: 119 MG/DL — HIGH (ref 70–99)
HCO3 BLDA-SCNC: 37 MMOL/L — HIGH (ref 21–29)
HCO3 BLDA-SCNC: 37 MMOL/L — HIGH (ref 21–29)
HCT VFR BLD CALC: 27.7 % — LOW (ref 34.5–45)
HGB BLD-MCNC: 7.6 G/DL — LOW (ref 11.5–15.5)
MAGNESIUM SERPL-MCNC: 2.2 MG/DL — SIGNIFICANT CHANGE UP (ref 1.6–2.6)
MCHC RBC-ENTMCNC: 27.4 GM/DL — LOW (ref 32–36)
MCHC RBC-ENTMCNC: 31.4 PG — SIGNIFICANT CHANGE UP (ref 27–34)
MCV RBC AUTO: 114.5 FL — HIGH (ref 80–100)
NRBC # BLD: 0 /100 WBCS — SIGNIFICANT CHANGE UP (ref 0–0)
PCO2 BLDA: 85 MMHG — CRITICAL HIGH (ref 32–46)
PCO2 BLDA: 89 MMHG — CRITICAL HIGH (ref 32–46)
PH BLDA: 7.24 — LOW (ref 7.35–7.45)
PH BLDA: 7.26 — LOW (ref 7.35–7.45)
PLATELET # BLD AUTO: 159 K/UL — SIGNIFICANT CHANGE UP (ref 150–400)
PO2 BLDA: 71 MMHG — LOW (ref 74–108)
PO2 BLDA: 73 MMHG — LOW (ref 74–108)
POTASSIUM SERPL-MCNC: 5.3 MMOL/L — SIGNIFICANT CHANGE UP (ref 3.5–5.3)
POTASSIUM SERPL-SCNC: 5.3 MMOL/L — SIGNIFICANT CHANGE UP (ref 3.5–5.3)
RBC # BLD: 2.42 M/UL — LOW (ref 3.8–5.2)
RBC # FLD: 16.4 % — HIGH (ref 10.3–14.5)
SAO2 % BLDA: 94 % — SIGNIFICANT CHANGE UP (ref 92–96)
SAO2 % BLDA: 96 % — SIGNIFICANT CHANGE UP (ref 92–96)
SODIUM SERPL-SCNC: 147 MMOL/L — HIGH (ref 135–145)
WBC # BLD: 8.01 K/UL — SIGNIFICANT CHANGE UP (ref 3.8–10.5)
WBC # FLD AUTO: 8.01 K/UL — SIGNIFICANT CHANGE UP (ref 3.8–10.5)

## 2020-07-05 PROCEDURE — 71045 X-RAY EXAM CHEST 1 VIEW: CPT | Mod: 26

## 2020-07-05 PROCEDURE — 93970 EXTREMITY STUDY: CPT | Mod: 26

## 2020-07-05 RX ADMIN — Medication 81 MILLIGRAM(S): at 13:34

## 2020-07-05 RX ADMIN — CHLORHEXIDINE GLUCONATE 1 APPLICATION(S): 213 SOLUTION TOPICAL at 13:21

## 2020-07-05 RX ADMIN — Medication 1: at 12:27

## 2020-07-05 RX ADMIN — BIMATOPROST 1 DROP(S): 0.3 SOLUTION/ DROPS OPHTHALMIC at 23:21

## 2020-07-05 RX ADMIN — Medication 50 MILLIGRAM(S): at 05:24

## 2020-07-05 RX ADMIN — ENOXAPARIN SODIUM 40 MILLIGRAM(S): 100 INJECTION SUBCUTANEOUS at 13:34

## 2020-07-05 RX ADMIN — Medication 25 MICROGRAM(S): at 05:24

## 2020-07-05 RX ADMIN — Medication 300 MILLIGRAM(S): at 13:34

## 2020-07-05 NOTE — PROGRESS NOTE ADULT - SUBJECTIVE AND OBJECTIVE BOX
Patient is a 74y old  Female who presents with a chief complaint of low blood count (05 Jul 2020 08:49)      INTERVAL HPI/OVERNIGHT EVENTS: noted  pt seen and examined  was lethargic this am,  ms  improved sp bipap      Vital Signs Last 24 Hrs  T(C): 36.9 (05 Jul 2020 15:32), Max: 36.9 (05 Jul 2020 08:12)  T(F): 98.5 (05 Jul 2020 15:32), Max: 98.5 (05 Jul 2020 15:32)  HR: 80 (05 Jul 2020 22:36) (77 - 86)  BP: 122/69 (05 Jul 2020 22:36) (98/63 - 122/69)  BP(mean): --  RR: 18 (05 Jul 2020 15:32) (18 - 18)  SpO2: 95% (05 Jul 2020 21:17) (92% - 98%)    acetaminophen   Tablet .. 650 milliGRAM(s) Oral every 6 hours PRN  allopurinol 300 milliGRAM(s) Oral daily  aspirin  chewable 81 milliGRAM(s) Oral daily  atorvastatin 10 milliGRAM(s) Oral at bedtime  bimatoprost 0.01% Ophthalmic Solution 1 Drop(s) Both EYES at bedtime  chlorhexidine 2% Cloths 1 Application(s) Topical daily  cloNIDine 0.2 milliGRAM(s) Oral at bedtime  dextrose 40% Gel 15 Gram(s) Oral once PRN  dextrose 5%. 1000 milliLiter(s) IV Continuous <Continuous>  dextrose 50% Injectable 12.5 Gram(s) IV Push once  dextrose 50% Injectable 25 Gram(s) IV Push once  dextrose 50% Injectable 25 Gram(s) IV Push once  doxazosin 4 milliGRAM(s) Oral at bedtime  enoxaparin Injectable 40 milliGRAM(s) SubCutaneous daily  glucagon  Injectable 1 milliGRAM(s) IntraMuscular once PRN  insulin lispro (HumaLOG) corrective regimen sliding scale   SubCutaneous three times a day before meals  insulin lispro (HumaLOG) corrective regimen sliding scale   SubCutaneous at bedtime  levothyroxine 25 MICROGram(s) Oral daily  metoprolol succinate ER 50 milliGRAM(s) Oral daily  senna 2 Tablet(s) Oral at bedtime      PHYSICAL EXAM:  GENERAL: mild respi distress, obese  EYES: conjunctiva and sclera clear  ENMT: Moist mucous membranes  NECK: Supple, No JVD, Normal thyroid  CHEST/LUNG: non labored, decreased BS at bases  HEART: Regular rate and rhythm; No murmurs, rubs, or gallops  ABDOMEN: Soft, Nontender, Nondistended; Bowel sounds present  EXTREMITIES:  lymph edema  LYMPH: No lymphadenopathy noted  SKIN: No rashes or lesions    Consultant(s) Notes Reviewed:  [x ] YES  [ ] NO  Care Discussed with Consultants/Other Providers [ x] YES  [ ] NO    LABS:                        7.6    8.01  )-----------( 159      ( 05 Jul 2020 06:55 )             27.7     07-05    147<H>  |  107  |  42<H>  ----------------------------<  119<H>  5.3   |  34<H>  |  1.60<H>    Ca    9.5      05 Jul 2020 06:55  Mg     2.2     07-05    TPro  6.3  /  Alb  3.2<L>  /  TBili  0.5  /  DBili  x   /  AST  10  /  ALT  9<L>  /  AlkPhos  68  07-04        CAPILLARY BLOOD GLUCOSE      POCT Blood Glucose.: 172 mg/dL (05 Jul 2020 21:19)  POCT Blood Glucose.: 136 mg/dL (05 Jul 2020 17:48)  POCT Blood Glucose.: 166 mg/dL (05 Jul 2020 11:55)  POCT Blood Glucose.: 142 mg/dL (05 Jul 2020 07:51)      ABG - ( 05 Jul 2020 18:56 )  pH, Arterial: 7.24  pH, Blood: x     /  pCO2: 89    /  pO2: 73    / HCO3: 37    / Base Excess: 8.8   /  SaO2: 94                      RADIOLOGY & ADDITIONAL TESTS:    Imaging Personally Reviewed:  [x ] YES  [ ] NO

## 2020-07-05 NOTE — PROGRESS NOTE ADULT - SUBJECTIVE AND OBJECTIVE BOX
Cardiology Dr. Rodrigez [phone]    SUBJECTIVE / OVERNIGHT EVENTS:    Patient seen and examined, pt more confused this AM    T(C): 36.9 (20 @ 08:12), Max: 37.1 (20 @ 10:52)  HR: 80 (20 @ 08:12) (79 - 89)  BP: 98/63 (20 @ 08:12) (98/63 - 141/62)  RR: 18 (20 @ 08:12) (18 - 20)  SpO2: 98% (20 @ 08:12) (82% - 98%)  Wt(kg): --  Daily     Daily Weight in k.9 (2020 17:43)  I&O's Summary    2020 07:01  -  2020 07:00  --------------------------------------------------------  IN: 480 mL / OUT: 950 mL / NET: -470 mL        Telemetry:    PHYSICAL EXAM:  GENERAL: Alert and awake mildly disorientated  HEAD:  Atraumatic, Normocephalic  NECK: Supple, No JVD  CHEST/LUNG: Clear to auscultation bilaterally; No wheeze, rhonchi or rales  HEART: S1S2, RR, 3/6 holosystolic  ABDOMEN: obese  EXTREMITIES: chronic lymphedema  NEUROLOGY: non-focal      LABS:                        7.6    8.01  )-----------( 159      ( 2020 06:55 )             27.7     07-05    147<H>  |  107  |  42<H>  ----------------------------<  119<H>  5.3   |  34<H>  |  1.60<H>    Ca    9.5      2020 06:55  Mg     2.2     07-05    TPro  6.3  /  Alb  3.2<L>  /  TBili  0.5  /  DBili  x   /  AST  10  /  ALT  9<L>  /  AlkPhos  68  07-04              MEDICATIONS  (STANDING):  allopurinol 300 milliGRAM(s) Oral daily  aspirin  chewable 81 milliGRAM(s) Oral daily  atorvastatin 10 milliGRAM(s) Oral at bedtime  bimatoprost 0.01% Ophthalmic Solution 1 Drop(s) Both EYES at bedtime  chlorhexidine 2% Cloths 1 Application(s) Topical daily  cloNIDine 0.2 milliGRAM(s) Oral at bedtime  dextrose 5%. 1000 milliLiter(s) (50 mL/Hr) IV Continuous <Continuous>  dextrose 50% Injectable 12.5 Gram(s) IV Push once  dextrose 50% Injectable 25 Gram(s) IV Push once  dextrose 50% Injectable 25 Gram(s) IV Push once  doxazosin 4 milliGRAM(s) Oral at bedtime  enoxaparin Injectable 40 milliGRAM(s) SubCutaneous daily  insulin lispro (HumaLOG) corrective regimen sliding scale   SubCutaneous three times a day before meals  insulin lispro (HumaLOG) corrective regimen sliding scale   SubCutaneous at bedtime  levothyroxine 25 MICROGram(s) Oral daily  metoprolol succinate ER 50 milliGRAM(s) Oral daily  senna 2 Tablet(s) Oral at bedtime    MEDICATIONS  (PRN):  acetaminophen   Tablet .. 650 milliGRAM(s) Oral every 6 hours PRN Temp greater or equal to 38C (100.4F), Moderate Pain (4 - 6)  dextrose 40% Gel 15 Gram(s) Oral once PRN Blood Glucose LESS THAN 70 milliGRAM(s)/deciliter  glucagon  Injectable 1 milliGRAM(s) IntraMuscular once PRN Glucose LESS THAN 70 milligrams/deciliter      RADIOLOGY & ADDITIONAL TESTS:

## 2020-07-05 NOTE — CHART NOTE - NSCHARTNOTEFT_GEN_A_CORE
74yr old f with a pmhx of FREDI on CPAP, Obesity hypoventilation syndrome, Chronic use of 3L of home continuous home oxygen, diabetes, hypertension, chronic lymphedema and iron deficiency anemia who presented with symptomatic anemia s/p prbc now with decline mental status sleepy, response appropriately to verbal and tactile stimuli. Lethargy is likely due hypercarbic ( ABG - ( 05 Jul 2020 09:38 - 7.26/[id number]/hco3 37) Per primary RR patient refused her CPAP machine overnight. Bipap ordered, will continue to monitor.

## 2020-07-05 NOTE — PROGRESS NOTE ADULT - ASSESSMENT
75 y/o female PMHx FREDI, obesity hypoventilation syndrome, DM2, HLD, LE lymphedema, on CPAP at night recently noncompliant, presents w symptomatic anemia    #ANemia-sp transfusion-improvement  occult neg  likely anemia of chr dz  hb down 7.4  Gi cs appreciated-pt refused egd  monitor hb closely, will transfuse if Hb ~7or <7    #Fluid overload state-improved  sp lasix x1 in ED  cards cs appreciated fu recs    .  # acute hypercapnic and hypoxic resp failure   appreciate pulm cs  ABG reveiwed, NIV as needed      # LANE  last creat 1.2 in 12/2019  Cr stable at 1.4, trend    # HTN   on clonidine and toprol XL  switched to oral lasix  TTE nml systolic fxn  CXR pulm edema    # hypothyroidism  takes synthroid 25mcg qd per [facility] EMR    # DM2   hold oral meds  SSI  a1c 6.7    DVt ppx  PT eval- dc planning-

## 2020-07-06 LAB
ANION GAP SERPL CALC-SCNC: 6 MMOL/L — SIGNIFICANT CHANGE UP (ref 5–17)
BASE EXCESS BLDA CALC-SCNC: 10.3 MMOL/L — HIGH (ref -2–2)
BASE EXCESS BLDA CALC-SCNC: 9.7 MMOL/L — HIGH (ref -2–2)
BUN SERPL-MCNC: 46 MG/DL — HIGH (ref 7–23)
CALCIUM SERPL-MCNC: 9.7 MG/DL — SIGNIFICANT CHANGE UP (ref 8.4–10.5)
CHLORIDE SERPL-SCNC: 106 MMOL/L — SIGNIFICANT CHANGE UP (ref 96–108)
CO2 BLDA-SCNC: 39 MMOL/L — HIGH (ref 22–30)
CO2 BLDA-SCNC: 41 MMOL/L — HIGH (ref 22–30)
CO2 SERPL-SCNC: 35 MMOL/L — HIGH (ref 22–31)
CREAT SERPL-MCNC: 1.45 MG/DL — HIGH (ref 0.5–1.3)
GAS PNL BLDA: SIGNIFICANT CHANGE UP
GAS PNL BLDA: SIGNIFICANT CHANGE UP
GLUCOSE BLDC GLUCOMTR-MCNC: 118 MG/DL — HIGH (ref 70–99)
GLUCOSE BLDC GLUCOMTR-MCNC: 119 MG/DL — HIGH (ref 70–99)
GLUCOSE BLDC GLUCOMTR-MCNC: 128 MG/DL — HIGH (ref 70–99)
GLUCOSE BLDC GLUCOMTR-MCNC: 128 MG/DL — HIGH (ref 70–99)
GLUCOSE SERPL-MCNC: 154 MG/DL — HIGH (ref 70–99)
HCO3 BLDA-SCNC: 37 MMOL/L — HIGH (ref 21–29)
HCO3 BLDA-SCNC: 38 MMOL/L — HIGH (ref 21–29)
HCT VFR BLD CALC: 26.7 % — LOW (ref 34.5–45)
HGB BLD-MCNC: 7.4 G/DL — LOW (ref 11.5–15.5)
HOROWITZ INDEX BLDA+IHG-RTO: 55 — SIGNIFICANT CHANGE UP
MCHC RBC-ENTMCNC: 27.7 GM/DL — LOW (ref 32–36)
MCHC RBC-ENTMCNC: 31.2 PG — SIGNIFICANT CHANGE UP (ref 27–34)
MCV RBC AUTO: 112.7 FL — HIGH (ref 80–100)
NRBC # BLD: 0 /100 WBCS — SIGNIFICANT CHANGE UP (ref 0–0)
PCO2 BLDA: 71 MMHG — CRITICAL HIGH (ref 32–46)
PCO2 BLDA: 89 MMHG — CRITICAL HIGH (ref 32–46)
PH BLDA: 7.25 — LOW (ref 7.35–7.45)
PH BLDA: 7.34 — LOW (ref 7.35–7.45)
PLATELET # BLD AUTO: 153 K/UL — SIGNIFICANT CHANGE UP (ref 150–400)
PO2 BLDA: 117 MMHG — HIGH (ref 74–108)
PO2 BLDA: 93 MMHG — SIGNIFICANT CHANGE UP (ref 74–108)
POTASSIUM SERPL-MCNC: 5.3 MMOL/L — SIGNIFICANT CHANGE UP (ref 3.5–5.3)
POTASSIUM SERPL-SCNC: 5.3 MMOL/L — SIGNIFICANT CHANGE UP (ref 3.5–5.3)
RBC # BLD: 2.37 M/UL — LOW (ref 3.8–5.2)
RBC # FLD: 16.2 % — HIGH (ref 10.3–14.5)
SAO2 % BLDA: 97 % — HIGH (ref 92–96)
SAO2 % BLDA: 99 % — HIGH (ref 92–96)
SODIUM SERPL-SCNC: 147 MMOL/L — HIGH (ref 135–145)
WBC # BLD: 8.99 K/UL — SIGNIFICANT CHANGE UP (ref 3.8–10.5)
WBC # FLD AUTO: 8.99 K/UL — SIGNIFICANT CHANGE UP (ref 3.8–10.5)

## 2020-07-06 PROCEDURE — 99233 SBSQ HOSP IP/OBS HIGH 50: CPT | Mod: GC

## 2020-07-06 RX ADMIN — CHLORHEXIDINE GLUCONATE 1 APPLICATION(S): 213 SOLUTION TOPICAL at 11:32

## 2020-07-06 RX ADMIN — ENOXAPARIN SODIUM 40 MILLIGRAM(S): 100 INJECTION SUBCUTANEOUS at 11:32

## 2020-07-06 RX ADMIN — BIMATOPROST 1 DROP(S): 0.3 SOLUTION/ DROPS OPHTHALMIC at 22:14

## 2020-07-06 RX ADMIN — Medication 300 MILLIGRAM(S): at 11:32

## 2020-07-06 RX ADMIN — SENNA PLUS 2 TABLET(S): 8.6 TABLET ORAL at 21:00

## 2020-07-06 RX ADMIN — Medication 81 MILLIGRAM(S): at 11:32

## 2020-07-06 RX ADMIN — Medication 10 MILLIGRAM(S): at 04:26

## 2020-07-06 RX ADMIN — ATORVASTATIN CALCIUM 10 MILLIGRAM(S): 80 TABLET, FILM COATED ORAL at 22:14

## 2020-07-06 RX ADMIN — Medication 0.2 MILLIGRAM(S): at 22:14

## 2020-07-06 RX ADMIN — Medication 4 MILLIGRAM(S): at 22:14

## 2020-07-06 RX ADMIN — Medication 25 MICROGRAM(S): at 08:22

## 2020-07-06 NOTE — PROGRESS NOTE ADULT - ASSESSMENT
Obesity hypoventialtion symdrome: today at baseline PCO2 in 70's, compensated  She had been using husbands BIPAP at home and is due for new titration  WIll plan to use BIPAP 5/12 nasal mask overnight and prn day for now  Heme f/u re: anemia

## 2020-07-06 NOTE — PROGRESS NOTE ADULT - SUBJECTIVE AND OBJECTIVE BOX
[facility] Cardiology Progress Note  _______________________________    Pt. seen and examined. No new cardiac-related complaints.    T(C): 37.1 (07-05-20 @ 23:16), Max: 37.1 (07-05-20 @ 23:16)  HR: 80 (07-06-20 @ 08:30) (77 - 86)  BP: 119/59 (07-05-20 @ 23:16) (113/65 - 122/69)  RR: 19 (07-05-20 @ 23:16) (18 - 19)  SpO2: 94% (07-06-20 @ 08:30) (91% - 97%)  I&O's Summary    05 Jul 2020 07:01  -  06 Jul 2020 07:00  --------------------------------------------------------  IN: 0 mL / OUT: 750 mL / NET: -750 mL        PHYSICAL EXAM:  GENERAL: Alert, NAD.  NECK: Supple  CHEST/LUNG: Clear to anterior auscultation bilaterally; No significant wheezes, rales, or rhonchi.  HEART: S1 S2 normal, RRR; No murmurs, rubs, or gallops  ABDOMEN: Soft, Nondistended  EXTREMITIES:  +2 LE edema.      LABS:                        7.6    8.01  )-----------( 159      ( 05 Jul 2020 06:55 )             27.7     07-05    147<H>  |  107  |  42<H>  ----------------------------<  119<H>  5.3   |  34<H>  |  1.60<H>    Ca    9.5      05 Jul 2020 06:55  Mg     2.2     07-05                    MEDICATIONS  (STANDING):  allopurinol 300 milliGRAM(s) Oral daily  aspirin  chewable 81 milliGRAM(s) Oral daily  atorvastatin 10 milliGRAM(s) Oral at bedtime  bimatoprost 0.01% Ophthalmic Solution 1 Drop(s) Both EYES at bedtime  chlorhexidine 2% Cloths 1 Application(s) Topical daily  cloNIDine 0.2 milliGRAM(s) Oral at bedtime  dextrose 5%. 1000 milliLiter(s) (50 mL/Hr) IV Continuous <Continuous>  dextrose 50% Injectable 12.5 Gram(s) IV Push once  dextrose 50% Injectable 25 Gram(s) IV Push once  dextrose 50% Injectable 25 Gram(s) IV Push once  doxazosin 4 milliGRAM(s) Oral at bedtime  enoxaparin Injectable 40 milliGRAM(s) SubCutaneous daily  insulin lispro (HumaLOG) corrective regimen sliding scale   SubCutaneous three times a day before meals  insulin lispro (HumaLOG) corrective regimen sliding scale   SubCutaneous at bedtime  levothyroxine 25 MICROGram(s) Oral daily  metoprolol succinate ER 50 milliGRAM(s) Oral daily  senna 2 Tablet(s) Oral at bedtime    MEDICATIONS  (PRN):  acetaminophen   Tablet .. 650 milliGRAM(s) Oral every 6 hours PRN Temp greater or equal to 38C (100.4F), Moderate Pain (4 - 6)  dextrose 40% Gel 15 Gram(s) Oral once PRN Blood Glucose LESS THAN 70 milliGRAM(s)/deciliter  glucagon  Injectable 1 milliGRAM(s) IntraMuscular once PRN Glucose LESS THAN 70 milligrams/deciliter        RADIOLOGY & ADDITIONAL TESTS:

## 2020-07-06 NOTE — PROGRESS NOTE ADULT - ASSESSMENT
74yr old f with a pmhx of FRDEI on CPAP, Obesity hypoventilation syndrome, Chronic use of 3L of home continuous home oxygen, diabetes, hypertension, chronic lymphedema and iron deficiency anemia told to come to ED by PCP for low hemoglobin (7.6).     TTE 2/2020  grossly normal lvef  trace pericardial effusion

## 2020-07-06 NOTE — PROVIDER CONTACT NOTE (CRITICAL VALUE NOTIFICATION) - SITUATION
ABG PH 7.28, PCO2 80
PCO2 85
ABG resulted w/CO2 of 89. Pt currently on bipap for previous ABG CO2 of 85. No s/s of sob, hypoxia, pt alert and arouses easily.
Pco2 89.

## 2020-07-06 NOTE — PROGRESS NOTE ADULT - ASSESSMENT
73 y/o female PMHx FREDI, obesity hypoventilation syndrome, DM2, HLD, LE lymphedema, on CPAP at night recently noncompliant, presents w symptomatic anemia    #ANemia-sp transfusion-improvement  occult neg  likely anemia of chr dz  hb down 7.4  Gi cs appreciated-pt refused egd  monitor hb closely, will transfuse if Hb ~7or <7    #Fluid overload state-improved  sp lasix x1 in ED  cards cs appreciated fu recs    .  # acute hypercapnic and hypoxic resp failure   appreciate pulm cs  ABG reveiwed, NIV as needed      # LANE  last creat 1.2 in 12/2019  Cr stable at 1.4, trend    # HTN   on clonidine and toprol XL  switched to oral lasix  TTE nml systolic fxn  CXR pulm edema    # hypothyroidism  takes synthroid 25mcg qd per [facility] EMR    # DM2   hold oral meds  SSI  a1c 6.7    DVt ppx  PT eval- dc planning-

## 2020-07-06 NOTE — CONSULT NOTE ADULT - ATTENDING COMMENTS
74 year old female with chronic medical problems including obesity and hypothyroidism and diabetes. She has presented with a macrocytic anemia and we will request assessment of thyroid status. Patient may have risk for MDS on basis of her age. Her serum vitamin B 12 level is normal .

## 2020-07-06 NOTE — PROGRESS NOTE ADULT - SUBJECTIVE AND OBJECTIVE BOX
Patient is a 74y old  Female who presents with a chief complaint of low blood count (06 Jul 2020 13:42)      INTERVAL HPI/OVERNIGHT EVENTS: noted  pt seen and examined  pt wearing bipap      Vital Signs Last 24 Hrs  T(C): 36.8 (06 Jul 2020 16:19), Max: 37.1 (05 Jul 2020 23:16)  T(F): 98.2 (06 Jul 2020 16:19), Max: 98.8 (05 Jul 2020 23:16)  HR: 89 (06 Jul 2020 16:19) (80 - 89)  BP: 136/77 (06 Jul 2020 16:19) (119/59 - 136/77)  BP(mean): --  RR: 20 (06 Jul 2020 16:19) (19 - 20)  SpO2: 93% (06 Jul 2020 16:19) (91% - 97%)    acetaminophen   Tablet .. 650 milliGRAM(s) Oral every 6 hours PRN  allopurinol 300 milliGRAM(s) Oral daily  aspirin  chewable 81 milliGRAM(s) Oral daily  atorvastatin 10 milliGRAM(s) Oral at bedtime  bimatoprost 0.01% Ophthalmic Solution 1 Drop(s) Both EYES at bedtime  chlorhexidine 2% Cloths 1 Application(s) Topical daily  cloNIDine 0.2 milliGRAM(s) Oral at bedtime  dextrose 40% Gel 15 Gram(s) Oral once PRN  dextrose 5%. 1000 milliLiter(s) IV Continuous <Continuous>  dextrose 50% Injectable 12.5 Gram(s) IV Push once  dextrose 50% Injectable 25 Gram(s) IV Push once  dextrose 50% Injectable 25 Gram(s) IV Push once  doxazosin 4 milliGRAM(s) Oral at bedtime  enoxaparin Injectable 40 milliGRAM(s) SubCutaneous daily  glucagon  Injectable 1 milliGRAM(s) IntraMuscular once PRN  insulin lispro (HumaLOG) corrective regimen sliding scale   SubCutaneous three times a day before meals  insulin lispro (HumaLOG) corrective regimen sliding scale   SubCutaneous at bedtime  levothyroxine 25 MICROGram(s) Oral daily  metoprolol succinate ER 50 milliGRAM(s) Oral daily  senna 2 Tablet(s) Oral at bedtime      PHYSICAL EXAM:  GENERAL: NAD, respi distress  EYES: conjunctiva and sclera clear  ENMT: Moist mucous membranes  NECK: Supple, No JVD, Normal thyroid  CHEST/LUNG:  labored, decreased bs b/l  HEART: Regular rate and rhythm; No murmurs, rubs, or gallops  ABDOMEN: Soft, Nontender, Nondistended; Bowel sounds present  EXTREMITIES:  2+ Peripheral Pulses, No clubbing, cyanosis, or edema  LYMPH: No lymphadenopathy noted  SKIN: No rashes or lesions    Consultant(s) Notes Reviewed:  [x ] YES  [ ] NO  Care Discussed with Consultants/Other Providers [ x] YES  [ ] NO    LABS:                        7.4    8.99  )-----------( 153      ( 06 Jul 2020 10:53 )             26.7     07-06    147<H>  |  106  |  46<H>  ----------------------------<  154<H>  5.3   |  35<H>  |  1.45<H>    Ca    9.7      06 Jul 2020 10:53  Mg     2.2     07-05          CAPILLARY BLOOD GLUCOSE      POCT Blood Glucose.: 119 mg/dL (06 Jul 2020 17:40)  POCT Blood Glucose.: 128 mg/dL (06 Jul 2020 13:14)  POCT Blood Glucose.: 118 mg/dL (06 Jul 2020 09:11)  POCT Blood Glucose.: 172 mg/dL (05 Jul 2020 21:19)      ABG - ( 06 Jul 2020 08:31 )  pH, Arterial: 7.34  pH, Blood: x     /  pCO2: 71    /  pO2: 117   / HCO3: 37    / Base Excess: 10.3  /  SaO2: 99                      RADIOLOGY & ADDITIONAL TESTS:    Imaging Personally Reviewed:  [x ] YES  [ ] NO

## 2020-07-06 NOTE — PROGRESS NOTE ADULT - SUBJECTIVE AND OBJECTIVE BOX
Follow-up Pulm Progress Note  Bharat Castellon MD  [phone]    Alert  ABG today at baseline: 7.34/71/117 with prior fluctaution to 7.25/80's  Outpatient records: patient with obesity hypoventialtion symdrome: indetermante or erroneous recent home titration; she has been using husbands BIPAp unit. Had been on 4/12 EPAP/IPAP    Medications:  Vital Signs Last 24 Hrs  T(C): 36.9 (06 Jul 2020 09:31), Max: 37.1 (05 Jul 2020 23:16)  T(F): 98.4 (06 Jul 2020 09:31), Max: 98.8 (05 Jul 2020 23:16)  HR: 82 (06 Jul 2020 09:31) (77 - 86)  BP: 120/60 (06 Jul 2020 09:31) (113/65 - 122/69)  BP(mean): --  RR: 20 (06 Jul 2020 09:31) (18 - 20)  SpO2: 94% (06 Jul 2020 09:31) (91% - 97%)  ABG - ( 06 Jul 2020 08:31 )  pH, Arterial: 7.34  pH, Blood: x     /  pCO2: 71    /  pO2: 117   / HCO3: 37    / Base Excess: 10.3  /  SaO2: 99          07-05 @ 07:01  -  07-06 @ 07:00  --------------------------------------------------------  IN: 0 mL / OUT: 750 mL / NET: -750 mL        LABS:                        7.4    8.99  )-----------( 153      ( 06 Jul 2020 10:53 )             26.7     07-06    147<H>  |  106  |  46<H>  ----------------------------<  154<H>  5.3   |  35<H>  |  1.45<H>    Ca    9.7      06 Jul 2020 10:53  Mg     2.2     07-05        Physical Examination:  PULM: No wheeze or rhonchi; distant  CVS: Regular rate and rhythm, no murmurs, rubs, or gallops  ABD: Soft, non-tender  EXT:  No clubbing, cyanosis, or edema    RADIOLOGY REVIEWED  CXR:    CT chest:    TTE:

## 2020-07-06 NOTE — CONSULT NOTE ADULT - ASSESSMENT
74F w/ FREDI on CPAP, obesity hypoventilation syndrome, chronic use of 3L of home continuous home oxygen, T2DM, HTN, chronic lymphedema and iron deficiency anemia, admitted for worsening anemia.    # Anemia, macrocytic:    - Iron studies (7/4): serum iron 29, iron saturation 14%, ferritin 131  - Folate >20, cobalamin 1371 74F w/ FREDI on CPAP, obesity hypoventilation syndrome, chronic use of 3L of home continuous home oxygen, T2DM, HTN, chronic lymphedema and iron deficiency anemia, admitted for worsening anemia.    # Anemia, macrocytic:  - DDx: medications vs. myelodysplasia   - macrocytosis present since early 2020, worsening over past few months with worsening anemia  - Iron studies (7/4): serum iron 29, iron saturation 14%, ferritin 131  - Folate >20, cobalamin 1371  - peripheral smear:     - recommend checking TSH, CBC with diff, reticulocyte percentage with next set of labs  - patient reports that levothyroxine was started a few months ago. Levothyroxine and/or uncontrolled hypothyroidism can cause anemia.    - if patient above labs are unremarkable, likely will need bone marrow biopsy to assess for myelodysplasia.  Given, body habitus/BMI, bone marrow biopsy will need to be done by IR.    Will follow-up above labs and determine next steps.     Above discussed with patient and  at bedside.     Maria T Kay MD  Hematology-Oncology Fellow, PGY-6  pager: [phone]  After 5pm or on weekends, please page the on-call fellow.

## 2020-07-06 NOTE — PROGRESS NOTE ADULT - ASSESSMENT
Morbid obesity with sleep apnea and/or OHS  Patients' outpatient settings for Bilevel: 4 CPAP/12IPAP  Patient appears at baseline with some fluctuating hypercapnea  Mentl status unrelated to PCO2    REC    Heme f/u for anemia perprimary team  While in hospitl: BIPAP at 4/12 as tolerated  May need outpatinet machine rechecked by pvt. vendor

## 2020-07-06 NOTE — PROVIDER CONTACT NOTE (CRITICAL VALUE NOTIFICATION) - ACTION/TREATMENT ORDERED:
Will continue to monitor
Continue to monitor. Keep Pt on bipap.
NP in to see pt, increase nasal cannula to 5LNC, will continue to monitor
Put patient back on bipap
no new orders
As per provider continue w/bipap as ordered. Will continue to monitor pt.

## 2020-07-06 NOTE — CONSULT NOTE ADULT - SUBJECTIVE AND OBJECTIVE BOX
HPI:  74F w/ FREDI on CPAP, Obesity hypoventilation syndrome, Chronic use of 3L of home continuous home oxygen, diabetes, hypertension, chronic lymphedema and iron deficiency anemia told to come to ED by PCP for low hemoglobin (7.6). Patient reports how in february of this year she was hospitalized and intubated for respiratory failure and discharged to rehab. She has gradually been tapered down to 3L of continuous oxygen at home now. She reports that is chronically short of breath but feels it is more related to her morbid obesity and deconditioned overall state. She is now dependant on a walker and cane to mobilize around the home and is unable to leave the home due to stairs. Patient notes she increased shortness of breath is notable with exertion and she fears the need for returning to the hospital. She spoke to her PCP and asked for blood work to be done approximately 3 weeks ago. At that time her hemoglobin reportedly dropped to the mid 8 and so she independently began to double up on her ferrous sulfate supplements. Recently she had the PCP follow up on the blood work in hopes of improvement only to find the hemoglobin had worsened to 7.6 and she was sent to the Emergency room for further workup. The ambulance came and brought her into the hospital however per the patient she denies any fever, headache, dizziness, nausea, nbnb emesis, chest pain, abdominal pain, changes in BM or urinary discomfort. (02 Jul 2020 02:15)    HEMATOLOGY HISTORY:  Per chart review, patient's hemoglobin was previously around 8-9 ([id number]), normocytic with normal WBC and platelets.   As of February 2020, hemoglobin also around 9 (now macrocytic to 103), also with normal WBC and platelets.   Has been taking iron supplements       PAST MEDICAL & SURGICAL HISTORY:  Obesity hypoventilation syndrome  Obesity, morbid  FREDI on CPAP  Ventral hernia  HLD (hyperlipidemia)  Diabetes mellitus type 2, controlled  Hypertension  S/P hernia repair: ventral hernia repair Jan 2016  S/P hernia repair: First ventral hernia repair in 1996 at [facility], Second ventral hernia repair with mesh at [facility] in 2007  S/P hysterectomy      Allergies  codeine (Rash)        MEDICATIONS  (STANDING):  allopurinol 300 milliGRAM(s) Oral daily  aspirin  chewable 81 milliGRAM(s) Oral daily  atorvastatin 10 milliGRAM(s) Oral at bedtime  bimatoprost 0.01% Ophthalmic Solution 1 Drop(s) Both EYES at bedtime  chlorhexidine 2% Cloths 1 Application(s) Topical daily  cloNIDine 0.2 milliGRAM(s) Oral at bedtime  dextrose 5%. 1000 milliLiter(s) (50 mL/Hr) IV Continuous <Continuous>  dextrose 50% Injectable 12.5 Gram(s) IV Push once  dextrose 50% Injectable 25 Gram(s) IV Push once  dextrose 50% Injectable 25 Gram(s) IV Push once  doxazosin 4 milliGRAM(s) Oral at bedtime  enoxaparin Injectable 40 milliGRAM(s) SubCutaneous daily  insulin lispro (HumaLOG) corrective regimen sliding scale   SubCutaneous three times a day before meals  insulin lispro (HumaLOG) corrective regimen sliding scale   SubCutaneous at bedtime  levothyroxine 25 MICROGram(s) Oral daily  metoprolol succinate ER 50 milliGRAM(s) Oral daily  senna 2 Tablet(s) Oral at bedtime    MEDICATIONS  (PRN):  acetaminophen   Tablet .. 650 milliGRAM(s) Oral every 6 hours PRN Temp greater or equal to 38C (100.4F), Moderate Pain (4 - 6)  dextrose 40% Gel 15 Gram(s) Oral once PRN Blood Glucose LESS THAN 70 milliGRAM(s)/deciliter  glucagon  Injectable 1 milliGRAM(s) IntraMuscular once PRN Glucose LESS THAN 70 milligrams/deciliter      FAMILY HISTORY:  Family history of diabetes mellitus in father      SOCIAL HISTORY: No EtOH, no tobacco      REVIEW OF SYSTEMS:  CONSTITUTIONAL: No weakness, fevers or chills  EYES/ENT: No visual changes;  No vertigo or throat pain   NECK: No pain or stiffness  RESPIRATORY: No cough, wheezing, hemoptysis; No shortness of breath  CARDIOVASCULAR: No chest pain or palpitations  GASTROINTESTINAL: No abdominal or epigastric pain. No nausea, vomiting, or hematemesis; No diarrhea or constipation. No melena or hematochezia.  GENITOURINARY: No dysuria, frequency or hematuria  NEUROLOGICAL: No numbness or weakness  SKIN: No itching, burning, rashes, or lesions   All other review of systems is negative unless indicated above.        T(F): 98.4 (07-06-20 @ 09:31), Max: 98.8 (07-05-20 @ 23:16)  HR: 82 (07-06-20 @ 09:31)  BP: 120/60 (07-06-20 @ 09:31)  RR: 20 (07-06-20 @ 09:31)  SpO2: 94% (07-06-20 @ 09:31)      GENERAL: NAD, well-developed  HEAD:  Atraumatic, Normocephalic  EYES: EOMI, PERRLA, conjunctiva and sclera clear  NECK: Supple, No JVD  CHEST/LUNG: Clear to auscultation bilaterally; No wheeze  HEART: Regular rate and rhythm; No murmurs, rubs, or gallops  ABDOMEN: Soft, Nontender, Nondistended; Bowel sounds present  EXTREMITIES:  2+ Peripheral Pulses, No clubbing, cyanosis, or edema  NEUROLOGY: non-focal  SKIN: No rashes or lesions                          7.4    8.99  )-----------( 153      ( 06 Jul 2020 10:53 )             26.7       07-06    147<H>  |  106  |  46<H>  ----------------------------<  154<H>  5.3   |  35<H>  |  1.45<H>    Ca    9.7      06 Jul 2020 10:53  Mg     2.2     07-05 HPI:  74F w/ FREDI on CPAP, Obesity hypoventilation syndrome, Chronic use of 3L of home continuous home oxygen, diabetes, hypertension, chronic lymphedema and iron deficiency anemia told to come to ED by PCP for low hemoglobin (7.6). Patient reports how in february of this year she was hospitalized and intubated for respiratory failure and discharged to rehab. She has gradually been tapered down to 3L of continuous oxygen at home now. She reports that is chronically short of breath but feels it is more related to her morbid obesity and deconditioned overall state. She is now dependant on a walker and cane to mobilize around the home and is unable to leave the home due to stairs. Patient notes she increased shortness of breath is notable with exertion and she fears the need for returning to the hospital. She spoke to her PCP and asked for blood work to be done approximately 3 weeks ago. At that time her hemoglobin reportedly dropped to the mid 8 and so she independently began to double up on her ferrous sulfate supplements. Recently she had the PCP follow up on the blood work in hopes of improvement only to find the hemoglobin had worsened to 7.6 and she was sent to the Emergency room for further workup. The ambulance came and brought her into the hospital however per the patient she denies any fever, headache, dizziness, nausea, nbnb emesis, chest pain, abdominal pain, changes in BM or urinary discomfort. (02 Jul 2020 02:15)    HEMATOLOGY HISTORY:  Per chart review, patient's hemoglobin was previously around 8-9 ([id number]), normocytic with normal WBC and platelets.   As of February 2020, hemoglobin also around 9 (now macrocytic to 103), also with normal WBC and platelets.   Has been taking iron supplements       PAST MEDICAL & SURGICAL HISTORY:  Obesity hypoventilation syndrome  Obesity, morbid  FREDI on CPAP  Ventral hernia  HLD (hyperlipidemia)  Diabetes mellitus type 2, controlled  Hypertension  S/P hernia repair: ventral hernia repair Jan 2016  S/P hernia repair: First ventral hernia repair in 1996 at [facility], Second ventral hernia repair with mesh at [facility] in 2007  S/P hysterectomy      Allergies  codeine (Rash)        MEDICATIONS  (STANDING):  allopurinol 300 milliGRAM(s) Oral daily  aspirin  chewable 81 milliGRAM(s) Oral daily  atorvastatin 10 milliGRAM(s) Oral at bedtime  bimatoprost 0.01% Ophthalmic Solution 1 Drop(s) Both EYES at bedtime  chlorhexidine 2% Cloths 1 Application(s) Topical daily  cloNIDine 0.2 milliGRAM(s) Oral at bedtime  dextrose 5%. 1000 milliLiter(s) (50 mL/Hr) IV Continuous <Continuous>  dextrose 50% Injectable 12.5 Gram(s) IV Push once  dextrose 50% Injectable 25 Gram(s) IV Push once  dextrose 50% Injectable 25 Gram(s) IV Push once  doxazosin 4 milliGRAM(s) Oral at bedtime  enoxaparin Injectable 40 milliGRAM(s) SubCutaneous daily  insulin lispro (HumaLOG) corrective regimen sliding scale   SubCutaneous three times a day before meals  insulin lispro (HumaLOG) corrective regimen sliding scale   SubCutaneous at bedtime  levothyroxine 25 MICROGram(s) Oral daily  metoprolol succinate ER 50 milliGRAM(s) Oral daily  senna 2 Tablet(s) Oral at bedtime    MEDICATIONS  (PRN):  acetaminophen   Tablet .. 650 milliGRAM(s) Oral every 6 hours PRN Temp greater or equal to 38C (100.4F), Moderate Pain (4 - 6)  dextrose 40% Gel 15 Gram(s) Oral once PRN Blood Glucose LESS THAN 70 milliGRAM(s)/deciliter  glucagon  Injectable 1 milliGRAM(s) IntraMuscular once PRN Glucose LESS THAN 70 milligrams/deciliter      FAMILY HISTORY:  Family history of diabetes mellitus in father      SOCIAL HISTORY: No EtOH, no tobacco      REVIEW OF SYSTEMS:  CONSTITUTIONAL: No weakness, fevers or chills  EYES/ENT: No visual changes;  No vertigo or throat pain   NECK: No pain or stiffness  RESPIRATORY: No cough, wheezing, hemoptysis; No shortness of breath  CARDIOVASCULAR: No chest pain or palpitations  GASTROINTESTINAL: No abdominal or epigastric pain. No nausea, vomiting, or hematemesis; No diarrhea or constipation. No melena or hematochezia.  GENITOURINARY: No dysuria, frequency or hematuria  NEUROLOGICAL: No numbness or weakness  SKIN: No itching, burning, rashes, or lesions   All other review of systems is negative unless indicated above.        T(F): 98.4 (07-06-20 @ 09:31), Max: 98.8 (07-05-20 @ 23:16)  HR: 82 (07-06-20 @ 09:31)  BP: 120/60 (07-06-20 @ 09:31)  RR: 20 (07-06-20 @ 09:31)  SpO2: 94% (07-06-20 @ 09:31)      GENERAL: NAD, well-developed  HEAD:  Atraumatic, Normocephalic  EYES: EOMI, conjunctiva and sclera clear  NECK: Supple  CHEST/LUNG: Clear to auscultation bilaterally  HEART: Regular rate and rhythm; No murmurs, rubs, or gallops  ABDOMEN: Soft, Nontender, Nondistended  EXTREMITIES:  bilateral LE edema   NEUROLOGY: non-focal  SKIN: No rashes or lesions                          7.4    8.99  )-----------( 153      ( 06 Jul 2020 10:53 )             26.7       07-06    147<H>  |  106  |  46<H>  ----------------------------<  154<H>  5.3   |  35<H>  |  1.45<H>    Ca    9.7      06 Jul 2020 10:53  Mg     2.2     07-05

## 2020-07-06 NOTE — PROGRESS NOTE ADULT - SUBJECTIVE AND OBJECTIVE BOX
Follow-up Pulm Progress Note  Bharat Castellon MD  [phone]    AFebrile, alert  S/P transufion of PRBC on admission: Hb 7.4 todfay  ABG today at baseline:  7.34/71/116 (Prior 7.25/80's)  No resp complaints at rest    Medications:  Vital Signs Last 24 Hrs  T(C): 36.9 (06 Jul 2020 09:31), Max: 37.1 (05 Jul 2020 23:16)  T(F): 98.4 (06 Jul 2020 09:31), Max: 98.8 (05 Jul 2020 23:16)  HR: 82 (06 Jul 2020 09:31) (77 - 86)  BP: 120/60 (06 Jul 2020 09:31) (113/65 - 122/69)  BP(mean): --  RR: 20 (06 Jul 2020 09:31) (18 - 20)  SpO2: 94% (06 Jul 2020 09:31) (91% - 97%)  ABG - ( 06 Jul 2020 08:31 )  pH, Arterial: 7.34  pH, Blood: x     /  pCO2: 71    /  pO2: 117   / HCO3: 37    / Base Excess: 10.3  /  SaO2: 99          07-05 @ 07:01  -  07-06 @ 07:00  --------------------------------------------------------  IN: 0 mL / OUT: 750 mL / NET: -750 mL      LABS:                        7.4    8.99  )-----------( 153      ( 06 Jul 2020 10:53 )             26.7     07-06    147<H>  |  106  |  46<H>  ----------------------------<  154<H>  5.3   |  35<H>  |  1.45<H>    Ca    9.7      06 Jul 2020 10:53  Mg     2.2     07-05    Physical Examination:  PULM: No wheeze or rhonchi  CVS: Regular rate and rhythm, no murmurs, rubs, or gallops  ABD: Soft, non-tender  EXT:  No clubbing, cyanosis, or edema    RADIOLOGY REVIEWED  CXR:    CT chest:    TTE:

## 2020-07-06 NOTE — CHART NOTE - NSCHARTNOTEFT_GEN_A_CORE
MEDICINE NP-EPISODIC NOTE    Overnight events: Patient with improvement PO2 levels on ABG however PCO2 remains 89. Patient AOx3, NAD. D/W RT, rec AVAPS, f/u ABG 8AM.     Sherlyn Welch, [facility]  [id number] MEDICINE NP-EPISODIC NOTE    Overnight events: Patient with improvement PO2 levels, 93 on ABG, however PCO2 remains 89. Patient AOx3, NAD. D/W RT, rec AVAPS, f/u ABG 8AM.     Sherlyn Welch, [facility]  [id number]

## 2020-07-07 LAB
ANION GAP SERPL CALC-SCNC: 4 MMOL/L — LOW (ref 5–17)
ANISOCYTOSIS BLD QL: SLIGHT — SIGNIFICANT CHANGE UP
BASOPHILS # BLD AUTO: 0.02 K/UL — SIGNIFICANT CHANGE UP (ref 0–0.2)
BASOPHILS # BLD AUTO: 0.03 K/UL — SIGNIFICANT CHANGE UP (ref 0–0.2)
BASOPHILS NFR BLD AUTO: 0.3 % — SIGNIFICANT CHANGE UP (ref 0–2)
BASOPHILS NFR BLD AUTO: 0.4 % — SIGNIFICANT CHANGE UP (ref 0–2)
BUN SERPL-MCNC: 40 MG/DL — HIGH (ref 7–23)
CALCIUM SERPL-MCNC: 9.7 MG/DL — SIGNIFICANT CHANGE UP (ref 8.4–10.5)
CHLORIDE SERPL-SCNC: 108 MMOL/L — SIGNIFICANT CHANGE UP (ref 96–108)
CO2 SERPL-SCNC: 38 MMOL/L — HIGH (ref 22–31)
CREAT SERPL-MCNC: 1.27 MG/DL — SIGNIFICANT CHANGE UP (ref 0.5–1.3)
EOSINOPHIL # BLD AUTO: 0.85 K/UL — HIGH (ref 0–0.5)
EOSINOPHIL # BLD AUTO: 0.85 K/UL — HIGH (ref 0–0.5)
EOSINOPHIL NFR BLD AUTO: 10.7 % — HIGH (ref 0–6)
EOSINOPHIL NFR BLD AUTO: 11.2 % — HIGH (ref 0–6)
GLUCOSE BLDC GLUCOMTR-MCNC: 117 MG/DL — HIGH (ref 70–99)
GLUCOSE BLDC GLUCOMTR-MCNC: 134 MG/DL — HIGH (ref 70–99)
GLUCOSE BLDC GLUCOMTR-MCNC: 155 MG/DL — HIGH (ref 70–99)
GLUCOSE BLDC GLUCOMTR-MCNC: 166 MG/DL — HIGH (ref 70–99)
GLUCOSE SERPL-MCNC: 104 MG/DL — HIGH (ref 70–99)
HCT VFR BLD CALC: 25.7 % — LOW (ref 34.5–45)
HCT VFR BLD CALC: 27.5 % — LOW (ref 34.5–45)
HGB BLD-MCNC: 7.2 G/DL — LOW (ref 11.5–15.5)
HGB BLD-MCNC: 7.7 G/DL — LOW (ref 11.5–15.5)
HYPOCHROMIA BLD QL: SIGNIFICANT CHANGE UP
IMM GRANULOCYTES NFR BLD AUTO: 0.4 % — SIGNIFICANT CHANGE UP (ref 0–1.5)
IMM GRANULOCYTES NFR BLD AUTO: 0.5 % — SIGNIFICANT CHANGE UP (ref 0–1.5)
LYMPHOCYTES # BLD AUTO: 0.53 K/UL — LOW (ref 1–3.3)
LYMPHOCYTES # BLD AUTO: 0.66 K/UL — LOW (ref 1–3.3)
LYMPHOCYTES # BLD AUTO: 7 % — LOW (ref 13–44)
LYMPHOCYTES # BLD AUTO: 8.3 % — LOW (ref 13–44)
MACROCYTES BLD QL: SIGNIFICANT CHANGE UP
MAGNESIUM SERPL-MCNC: 2.1 MG/DL — SIGNIFICANT CHANGE UP (ref 1.6–2.6)
MANUAL SMEAR VERIFICATION: SIGNIFICANT CHANGE UP
MCHC RBC-ENTMCNC: 28 GM/DL — LOW (ref 32–36)
MCHC RBC-ENTMCNC: 28 GM/DL — LOW (ref 32–36)
MCHC RBC-ENTMCNC: 31.7 PG — SIGNIFICANT CHANGE UP (ref 27–34)
MCHC RBC-ENTMCNC: 31.9 PG — SIGNIFICANT CHANGE UP (ref 27–34)
MCV RBC AUTO: 113.2 FL — HIGH (ref 80–100)
MCV RBC AUTO: 113.7 FL — HIGH (ref 80–100)
MICROCYTES BLD QL: SLIGHT — SIGNIFICANT CHANGE UP
MONOCYTES # BLD AUTO: 0.5 K/UL — SIGNIFICANT CHANGE UP (ref 0–0.9)
MONOCYTES # BLD AUTO: 0.61 K/UL — SIGNIFICANT CHANGE UP (ref 0–0.9)
MONOCYTES NFR BLD AUTO: 6.6 % — SIGNIFICANT CHANGE UP (ref 2–14)
MONOCYTES NFR BLD AUTO: 7.7 % — SIGNIFICANT CHANGE UP (ref 2–14)
NEUTROPHILS # BLD AUTO: 5.61 K/UL — SIGNIFICANT CHANGE UP (ref 1.8–7.4)
NEUTROPHILS # BLD AUTO: 5.78 K/UL — SIGNIFICANT CHANGE UP (ref 1.8–7.4)
NEUTROPHILS NFR BLD AUTO: 72.6 % — SIGNIFICANT CHANGE UP (ref 43–77)
NEUTROPHILS NFR BLD AUTO: 74.3 % — SIGNIFICANT CHANGE UP (ref 43–77)
NRBC # BLD: 0 /100 WBCS — SIGNIFICANT CHANGE UP (ref 0–0)
NRBC # BLD: 0 /100 WBCS — SIGNIFICANT CHANGE UP (ref 0–0)
PLAT MORPH BLD: NORMAL — SIGNIFICANT CHANGE UP
PLATELET # BLD AUTO: 145 K/UL — LOW (ref 150–400)
PLATELET # BLD AUTO: 151 K/UL — SIGNIFICANT CHANGE UP (ref 150–400)
POTASSIUM SERPL-MCNC: 5 MMOL/L — SIGNIFICANT CHANGE UP (ref 3.5–5.3)
POTASSIUM SERPL-SCNC: 5 MMOL/L — SIGNIFICANT CHANGE UP (ref 3.5–5.3)
RBC # BLD: 2.26 M/UL — LOW (ref 3.8–5.2)
RBC # BLD: 2.26 M/UL — LOW (ref 3.8–5.2)
RBC # BLD: 2.43 M/UL — LOW (ref 3.8–5.2)
RBC # BLD: 2.43 M/UL — LOW (ref 3.8–5.2)
RBC # FLD: 16.2 % — HIGH (ref 10.3–14.5)
RBC # FLD: 16.3 % — HIGH (ref 10.3–14.5)
RBC BLD AUTO: ABNORMAL
RETICS #: 59.7 K/UL — SIGNIFICANT CHANGE UP (ref 25–125)
RETICS #: 66.6 K/UL — SIGNIFICANT CHANGE UP (ref 25–125)
RETICS/RBC NFR: 2.6 % — HIGH (ref 0.5–2.5)
RETICS/RBC NFR: 2.7 % — HIGH (ref 0.5–2.5)
SODIUM SERPL-SCNC: 150 MMOL/L — HIGH (ref 135–145)
TSH SERPL-MCNC: 2.04 UIU/ML — SIGNIFICANT CHANGE UP (ref 0.27–4.2)
TSH SERPL-MCNC: 2.48 UIU/ML — SIGNIFICANT CHANGE UP (ref 0.27–4.2)
WBC # BLD: 7.56 K/UL — SIGNIFICANT CHANGE UP (ref 3.8–10.5)
WBC # BLD: 7.95 K/UL — SIGNIFICANT CHANGE UP (ref 3.8–10.5)
WBC # FLD AUTO: 7.56 K/UL — SIGNIFICANT CHANGE UP (ref 3.8–10.5)
WBC # FLD AUTO: 7.95 K/UL — SIGNIFICANT CHANGE UP (ref 3.8–10.5)

## 2020-07-07 RX ORDER — ENOXAPARIN SODIUM 100 MG/ML
40 INJECTION SUBCUTANEOUS DAILY
Refills: 0 | Status: DISCONTINUED | OUTPATIENT
Start: 2020-07-08 | End: 2020-07-14

## 2020-07-07 RX ORDER — SODIUM CHLORIDE 9 MG/ML
1000 INJECTION, SOLUTION INTRAVENOUS
Refills: 0 | Status: DISCONTINUED | OUTPATIENT
Start: 2020-07-07 | End: 2020-07-14

## 2020-07-07 RX ADMIN — Medication 300 MILLIGRAM(S): at 12:38

## 2020-07-07 RX ADMIN — Medication 0.2 MILLIGRAM(S): at 21:11

## 2020-07-07 RX ADMIN — SODIUM CHLORIDE 100 MILLILITER(S): 9 INJECTION, SOLUTION INTRAVENOUS at 13:45

## 2020-07-07 RX ADMIN — Medication 25 MICROGRAM(S): at 05:24

## 2020-07-07 RX ADMIN — ATORVASTATIN CALCIUM 10 MILLIGRAM(S): 80 TABLET, FILM COATED ORAL at 21:11

## 2020-07-07 RX ADMIN — Medication 1: at 17:33

## 2020-07-07 RX ADMIN — CHLORHEXIDINE GLUCONATE 1 APPLICATION(S): 213 SOLUTION TOPICAL at 12:38

## 2020-07-07 RX ADMIN — ENOXAPARIN SODIUM 40 MILLIGRAM(S): 100 INJECTION SUBCUTANEOUS at 12:38

## 2020-07-07 RX ADMIN — BIMATOPROST 1 DROP(S): 0.3 SOLUTION/ DROPS OPHTHALMIC at 21:12

## 2020-07-07 RX ADMIN — Medication 50 MILLIGRAM(S): at 05:24

## 2020-07-07 RX ADMIN — SENNA PLUS 2 TABLET(S): 8.6 TABLET ORAL at 21:11

## 2020-07-07 RX ADMIN — Medication 81 MILLIGRAM(S): at 12:38

## 2020-07-07 RX ADMIN — Medication 4 MILLIGRAM(S): at 21:11

## 2020-07-07 NOTE — PROGRESS NOTE ADULT - PROBLEM SELECTOR PROBLEM 1
Obesity hypoventilation syndrome
Obesity hypoventilation syndrome
Acute pulmonary edema
Obesity hypoventilation syndrome

## 2020-07-07 NOTE — PROGRESS NOTE ADULT - ATTENDING COMMENTS
[facility]  [phone]
[facility]  [phone]
Dr Lin will be covering  starting 7/8/20  [facility] call [facility] @ [phone] for questions or concerns
[facility]  [phone]
[facility]  [phone]

## 2020-07-07 NOTE — PROGRESS NOTE ADULT - SUBJECTIVE AND OBJECTIVE BOX
Follow-up Pulm Progress Note  Bharat Castellon MD  [phone]    Alert ye c/o intermittent dyspnea - supine in bed  Sats at visit on nasal O2 i low 80's - increased with deep inspiration      Vital Signs Last 24 Hrs  T(C): 37 (07 Jul 2020 08:07), Max: 37.2 (07 Jul 2020 00:43)  T(F): 98.6 (07 Jul 2020 08:07), Max: 99 (07 Jul 2020 00:43)  HR: 88 (07 Jul 2020 09:41) (82 - 98)  BP: 111/62 (07 Jul 2020 08:07) (111/62 - 136/77)  BP(mean): --  RR: 20 (07 Jul 2020 08:07) (20 - 20)  SpO2: 94% (07 Jul 2020 09:41) (91% - 97%)    ABG - ( 06 Jul 2020 08:31 )  pH, Arterial: 7.34  pH, Blood: x     /  pCO2: 71    /  pO2: 117   / HCO3: 37    / Base Excess: 10.3  /  SaO2: 99                              7.2    7.56  )-----------( 151      ( 07 Jul 2020 10:20 )             25.7       07-06    147<H>  |  106  |  46<H>  ----------------------------<  154<H>  5.3   |  35<H>  |  1.45<H>    Ca    9.7      06 Jul 2020 10:53        Physical Examination:  PULM: No wheeze or rhonchi; distant  CVS: Regular rate and rhythm, no murmurs, rubs, or gallops  ABD: Soft, non-tender  EXT:  No clubbing, cyanosis, or edema    RADIOLOGY REVIEWED  CXR:    CT chest:    TTE:

## 2020-07-07 NOTE — DIETITIAN INITIAL EVALUATION ADULT. - PHYSICAL APPEARANCE
other (specify)/obese Ht: 62in, Dosing Wt: 293lbs, BMI: 53.6kg/m2, IBW: 110lbs +/- 10%  Edema: 3+ (bilateral legs and ankles)   Skin per nursing flowsheets: no pressure injury documented

## 2020-07-07 NOTE — PROGRESS NOTE ADULT - SUBJECTIVE AND OBJECTIVE BOX
Patient is a 74y old  Female who presents with a chief complaint of low blood count (07 Jul 2020 11:21)      INTERVAL HPI/OVERNIGHT EVENTS: noted  pt seen and examined  sp bipap at night  sob at rest      Vital Signs Last 24 Hrs  T(C): 37 (07 Jul 2020 16:06), Max: 37.2 (07 Jul 2020 00:43)  T(F): 98.6 (07 Jul 2020 16:06), Max: 99 (07 Jul 2020 00:43)  HR: 89 (07 Jul 2020 16:06) (82 - 98)  BP: 104/72 (07 Jul 2020 16:06) (104/72 - 126/63)  BP(mean): --  RR: 20 (07 Jul 2020 16:06) (20 - 20)  SpO2: 94% (07 Jul 2020 16:06) (91% - 96%)    acetaminophen   Tablet .. 650 milliGRAM(s) Oral every 6 hours PRN  allopurinol 300 milliGRAM(s) Oral daily  aspirin  chewable 81 milliGRAM(s) Oral daily  atorvastatin 10 milliGRAM(s) Oral at bedtime  bimatoprost 0.01% Ophthalmic Solution 1 Drop(s) Both EYES at bedtime  chlorhexidine 2% Cloths 1 Application(s) Topical daily  cloNIDine 0.2 milliGRAM(s) Oral at bedtime  dextrose 40% Gel 15 Gram(s) Oral once PRN  dextrose 5%. 1000 milliLiter(s) IV Continuous <Continuous>  dextrose 5%. 1000 milliLiter(s) IV Continuous <Continuous>  dextrose 50% Injectable 12.5 Gram(s) IV Push once  dextrose 50% Injectable 25 Gram(s) IV Push once  dextrose 50% Injectable 25 Gram(s) IV Push once  doxazosin 4 milliGRAM(s) Oral at bedtime  glucagon  Injectable 1 milliGRAM(s) IntraMuscular once PRN  insulin lispro (HumaLOG) corrective regimen sliding scale   SubCutaneous three times a day before meals  insulin lispro (HumaLOG) corrective regimen sliding scale   SubCutaneous at bedtime  levothyroxine 25 MICROGram(s) Oral daily  metoprolol succinate ER 50 milliGRAM(s) Oral daily  senna 2 Tablet(s) Oral at bedtime      PHYSICAL EXAM:  GENERAL: mild respi distress, obese  EYES: conjunctiva and sclera clear  ENMT: Moist mucous membranes  NECK: Supple, No JVD, Normal thyroid  CHEST/LUNG: non labored, cta b/l  HEART: Regular rate and rhythm; No murmurs, rubs, or gallops  ABDOMEN: Soft, Nontender, Nondistended; Bowel sounds present  EXTREMITIES: lymphedema  LYMPH: No lymphadenopathy noted  SKIN: No rashes or lesions    Consultant(s) Notes Reviewed:  [x ] YES  [ ] NO  Care Discussed with Consultants/Other Providers [ x] YES  [ ] NO    LABS:                        7.2    7.56  )-----------( 151      ( 07 Jul 2020 10:20 )             25.7     07-07    150<H>  |  108  |  40<H>  ----------------------------<  104<H>  5.0   |  38<H>  |  1.27    Ca    9.7      07 Jul 2020 10:20  Mg     2.1     07-07          CAPILLARY BLOOD GLUCOSE      POCT Blood Glucose.: 166 mg/dL (07 Jul 2020 21:09)  POCT Blood Glucose.: 155 mg/dL (07 Jul 2020 17:24)  POCT Blood Glucose.: 134 mg/dL (07 Jul 2020 12:49)  POCT Blood Glucose.: 117 mg/dL (07 Jul 2020 09:22)      ABG - ( 06 Jul 2020 08:31 )  pH, Arterial: 7.34  pH, Blood: x     /  pCO2: 71    /  pO2: 117   / HCO3: 37    / Base Excess: 10.3  /  SaO2: 99                      RADIOLOGY & ADDITIONAL TESTS:    Imaging Personally Reviewed:  [x ] YES  [ ] NO

## 2020-07-07 NOTE — PROGRESS NOTE ADULT - PROBLEM SELECTOR PLAN 3
-  -secondary to medication  -continue to hold lasix today
-  -secondary to medication  -continue to hold lasix today
-  -s/p 1u pRBC.   -continue management as per primary team
-  -secondary to medication  -continue to hold lasix until renal function is close to baseline. Then can resume home dose of lasix.

## 2020-07-07 NOTE — PROGRESS NOTE ADULT - PROBLEM SELECTOR PLAN 2
-  - Pt does not appear fluid overloaded on exam today and labs showing intravascular depleted   - Chronic LE lymphedema   -Patient was given lasix x1 in ED ? diastolic HF in setting of anemia  -Would continue to hold lasix for now  - Management of anemia as per primary team  -Daily weights and strict I+Os  -wean o2  -tte as above.
-  -secondary to medication  -continue to hold lasix today  -can resume tomorrow if creatinine improving.
-  - Pt does not appear fluid overloaded and labs show intravascular depletion  - Chronic LE lymphedema   -Patient was given lasix x1 in ED ? diastolic HF in setting of anemia  -Would continue to hold lasix for now  - Management of anemia as per primary team  -Daily weights and strict I+Os  -wean o2  -tte as above.
-  - Pt does not appear fluid overloaded and labs show intravascular depletion  - Chronic LE lymphedema   -Patient was given lasix x1 in ED ? diastolic HF in setting of anemia  -Would continue to hold lasix for now  - Management of anemia as per primary team  -Daily weights and strict I+Os  -wean o2  -tte as above.

## 2020-07-07 NOTE — PROGRESS NOTE ADULT - SUBJECTIVE AND OBJECTIVE BOX
[facility] Cardiology Progress Note  _______________________________    Pt. seen and examined. No new cardiac-related complaints.    T(C): 37 (07-07-20 @ 08:07), Max: 37.2 (07-07-20 @ 00:43)  HR: 87 (07-07-20 @ 08:07) (82 - 98)  BP: 111/62 (07-07-20 @ 08:07) (111/62 - 136/77)  RR: 20 (07-07-20 @ 08:07) (20 - 20)  SpO2: 92% (07-07-20 @ 08:07) (91% - 97%)  I&O's Summary    06 Jul 2020 07:01  -  07 Jul 2020 07:00  --------------------------------------------------------  IN: 950 mL / OUT: 2000 mL / NET: -1050 mL        PHYSICAL EXAM:  GENERAL: Alert, NAD.  NECK: Supple  CHEST/LUNG: Clear to auscultation bilaterally; No wheezes, rales, or rhonchi.  HEART: S1 S2 normal, RRR; No murmurs, rubs, or gallops  ABDOMEN: Soft, Nondistended  EXTREMITIES:  No LE edema.      LABS:                        7.4    8.99  )-----------( 153      ( 06 Jul 2020 10:53 )             26.7     07-06    147<H>  |  106  |  46<H>  ----------------------------<  154<H>  5.3   |  35<H>  |  1.45<H>    Ca    9.7      06 Jul 2020 10:53                    MEDICATIONS  (STANDING):  allopurinol 300 milliGRAM(s) Oral daily  aspirin  chewable 81 milliGRAM(s) Oral daily  atorvastatin 10 milliGRAM(s) Oral at bedtime  bimatoprost 0.01% Ophthalmic Solution 1 Drop(s) Both EYES at bedtime  chlorhexidine 2% Cloths 1 Application(s) Topical daily  cloNIDine 0.2 milliGRAM(s) Oral at bedtime  dextrose 5%. 1000 milliLiter(s) (50 mL/Hr) IV Continuous <Continuous>  dextrose 50% Injectable 12.5 Gram(s) IV Push once  dextrose 50% Injectable 25 Gram(s) IV Push once  dextrose 50% Injectable 25 Gram(s) IV Push once  doxazosin 4 milliGRAM(s) Oral at bedtime  enoxaparin Injectable 40 milliGRAM(s) SubCutaneous daily  insulin lispro (HumaLOG) corrective regimen sliding scale   SubCutaneous three times a day before meals  insulin lispro (HumaLOG) corrective regimen sliding scale   SubCutaneous at bedtime  levothyroxine 25 MICROGram(s) Oral daily  metoprolol succinate ER 50 milliGRAM(s) Oral daily  senna 2 Tablet(s) Oral at bedtime    MEDICATIONS  (PRN):  acetaminophen   Tablet .. 650 milliGRAM(s) Oral every 6 hours PRN Temp greater or equal to 38C (100.4F), Moderate Pain (4 - 6)  dextrose 40% Gel 15 Gram(s) Oral once PRN Blood Glucose LESS THAN 70 milliGRAM(s)/deciliter  glucagon  Injectable 1 milliGRAM(s) IntraMuscular once PRN Glucose LESS THAN 70 milligrams/deciliter        RADIOLOGY & ADDITIONAL TESTS:

## 2020-07-07 NOTE — PROGRESS NOTE ADULT - PROBLEM SELECTOR PLAN 4
-  -s/p 1u pRBC.   -continue to monitor H/H transfuse as needed
-  -s/p 1u pRBC.   -continue to monitor H/H transfuse as needed    Abhilash Spann D.O.  [phone]
-  -s/p 1u pRBC.   -continue to monitor H/H transfuse as needed    will sign off. please reconsult as needed.     Abhilash Spann D.O.  [phone]

## 2020-07-07 NOTE — CHART NOTE - NSCHARTNOTEFT_GEN_A_CORE
Upon Nutritional Assessment by the Registered Dietitian your patient was determined to meet criteria / has evidence of the following diagnosis/diagnoses:          [ ]  Mild Protein Calorie Malnutrition        [ ]  Moderate Protein Calorie Malnutrition        [ ] Severe Protein Calorie Malnutrition        [ ] Unspecified Protein Calorie Malnutrition        [ ] Underweight / BMI <19        [X] Morbid Obesity / BMI > 40      Findings as based on:  [X] Comprehensive nutrition assessment   [ ] Nutrition Focused Physical Exam  [X] Other: BMI 53.3 kg/m2      Nutrition Plan/Recommendations:    1) Continue to encourage po intake and obtain/honor food preferences as able.   2) Reinforce diet education as needed; RD remains available.   3) Monitor PO intake, diet tolerance, weight trends, labs, GI function, and skin integrity      PROVIDER Section:     By signing this assessment you are acknowledging and agree with the diagnosis/diagnoses assigned by the Registered Dietitian    Comments:

## 2020-07-07 NOTE — DIETITIAN INITIAL EVALUATION ADULT. - ADD RECOMMEND
1) Continue to encourage po intake and obtain/honor food preferences as able. 2) Reinforce diet education as needed; RD remains available. 3) Monitor PO intake, diet tolerance, weight trends, labs, GI function, and skin integrity. 4) BMI sticker placed - provider made aware.

## 2020-07-07 NOTE — DIETITIAN INITIAL EVALUATION ADULT. - PERTINENT LABORATORY DATA
Na 150(H), BUN 40(H), A1C 5.9(H)  CAPILLARY BLOOD GLUCOSE      POCT Blood Glucose.: 134 mg/dL (07 Jul 2020 12:49)  POCT Blood Glucose.: 117 mg/dL (07 Jul 2020 09:22)  POCT Blood Glucose.: 128 mg/dL (06 Jul 2020 21:27)  POCT Blood Glucose.: 119 mg/dL (06 Jul 2020 17:40)

## 2020-07-07 NOTE — DIETITIAN INITIAL EVALUATION ADULT. - OTHER INFO
Visited pt at bedside. Pt reports to having a good appetite at home. Does not follow a specific diet, but does try to monitor sodium and carbohydrate intake as well as portions. Pt states she has an aid who comes in 2x/week to help with cooking, and her  helps as well. Pt denies any known food allergies or intolerances. Pt endorses Multivitamin, vitamin C and D supplementation at home.     In-house, pt noted to consuming 25-75% of meals per flowsheet. Pt states that she deliberately does not eat much due to promote weight loss. Pt expresses frustration with her weight and has been actively trying to lose as much as she can. Pt also states that she does not eat much because she is afraid of not making it to the bathroom on time. Pt denies any chewing/swallowing difficulty, self-feeding difficulty, nausea/vomiting, constipation, or diarrhea. Last BM earlier today.     Pt reports a UBW of 315lbs back in March 2020. Pt ecstatic to know she lost ~62lbs over the past 4 months compared to current weight of 253.3lbs. Majority of weight loss likely attributed to fluid loss from previous diuresis.     Education: Pt encouraged to monitor sodium intake to help prevent fluid retention. Pt also encouraged to have adequate PO intake focusing on protein; sources discussed. Pt made aware that the team is monitoring her carbohydrate and calorie intake; encouraged her note to be afraid to eat as she can in-house. Briefly reviewed Consistent Carbohydrate diet; all questions answered to pt satisfaction.

## 2020-07-07 NOTE — CHART NOTE - NSCHARTNOTEFT_GEN_A_CORE
Today's Na 150. Started D5% 100 cc/hr x 4 hrs as per Dr Rocha.  Encouraged to increase po fluids. UTE RN.  Maribel Charlton NP  [phone]

## 2020-07-07 NOTE — DIETITIAN INITIAL EVALUATION ADULT. - PERTINENT MEDS FT
MEDICATIONS  (STANDING):  allopurinol 300 milliGRAM(s) Oral daily  aspirin  chewable 81 milliGRAM(s) Oral daily  atorvastatin 10 milliGRAM(s) Oral at bedtime  bimatoprost 0.01% Ophthalmic Solution 1 Drop(s) Both EYES at bedtime  chlorhexidine 2% Cloths 1 Application(s) Topical daily  cloNIDine 0.2 milliGRAM(s) Oral at bedtime  dextrose 5%. 1000 milliLiter(s) (50 mL/Hr) IV Continuous <Continuous>  dextrose 5%. 1000 milliLiter(s) (100 mL/Hr) IV Continuous <Continuous>  dextrose 50% Injectable 12.5 Gram(s) IV Push once  dextrose 50% Injectable 25 Gram(s) IV Push once  dextrose 50% Injectable 25 Gram(s) IV Push once  doxazosin 4 milliGRAM(s) Oral at bedtime  enoxaparin Injectable 40 milliGRAM(s) SubCutaneous daily  insulin lispro (HumaLOG) corrective regimen sliding scale   SubCutaneous three times a day before meals  insulin lispro (HumaLOG) corrective regimen sliding scale   SubCutaneous at bedtime  levothyroxine 25 MICROGram(s) Oral daily  metoprolol succinate ER 50 milliGRAM(s) Oral daily  senna 2 Tablet(s) Oral at bedtime    MEDICATIONS  (PRN):  acetaminophen   Tablet .. 650 milliGRAM(s) Oral every 6 hours PRN Temp greater or equal to 38C (100.4F), Moderate Pain (4 - 6)  dextrose 40% Gel 15 Gram(s) Oral once PRN Blood Glucose LESS THAN 70 milliGRAM(s)/deciliter  glucagon  Injectable 1 milliGRAM(s) IntraMuscular once PRN Glucose LESS THAN 70 milligrams/deciliter

## 2020-07-07 NOTE — PROGRESS NOTE ADULT - ASSESSMENT
Obesity hypoventialtion symdrome: today at baseline PCO2 in 70's, compensated  Anemia  Patient with supine sats in low 80's tday: likely due to basilar atelectasis/poor inspiraion    REC    Convert to face mask when on BIPAP.: 12/5  Intermitent BIPAP today until repositioned in more upright position at all times; OOB to chair if possible.  Monitor pulse ox today: no need for ABG's unless drastic decompensation

## 2020-07-07 NOTE — PROGRESS NOTE ADULT - ASSESSMENT
75 y/o female PMHx FREDI, obesity hypoventilation syndrome, DM2, HLD, LE lymphedema, on CPAP at night recently noncompliant, presents w symptomatic anemia    #ANemia-sp transfusion-improvement  occult neg  hb down 7.4  Gi cs appreciated-pt refused egd  monitor hb closely, will transfuse if Hb ~7or <7  heme cs noted, r/o MDS-bm bx with IR given body habitus    #Fluid overload state-improved  sp lasix x1 in ED  cards cs appreciated fu recs    .  # acute hypercapnic and hypoxic resp failure   obesity hypoventilation  appreciate pulm cs  ABG reveiwed, NIV as needed      # LANE  last creat 1.2 in 12/2019  Cr stable at 1.4, trend    # HTN   on clonidine and toprol XL  switched to oral lasix  TTE nml systolic fxn  CXR pulm edema    # hypothyroidism  takes synthroid 25mcg qd per [facility] EMR    # DM2   hold oral meds  SSI  a1c 6.7    DVt ppx  PT eval- dc planning-

## 2020-07-07 NOTE — DIETITIAN INITIAL EVALUATION ADULT. - REASON INDICATOR FOR ASSESSMENT
Pt seen for BMI.  Source: EMR and pt  Pertinent chart information: 74 yr old f with recent hx of acute respiratory failure presents to the emergency room with acute hypoxic respiratory failure secondary to volume overload and acute anemia.

## 2020-07-07 NOTE — PROGRESS NOTE ADULT - PROBLEM SELECTOR PLAN 1
-  - patient more lethargic and disorientated this AM  - would check ABG
-  - Pt does not appear fluid overloaded on exam today and labs showing intravascular depleted   - Chronic LE lymphedema   -Patient was given lasix x1 in ED ? diastolic HF in setting of anemia  -Would continue to hold lasix for now  - Management of anemia as per primary team  -Daily weights and strict I+Os  -wean o2  -tte as above.
-  - currently on bipap for hypercarbic respiratory distress.
-  - currently on bipap for hypercarbic respiratory distress.

## 2020-07-08 LAB
ANION GAP SERPL CALC-SCNC: 6 MMOL/L — SIGNIFICANT CHANGE UP (ref 5–17)
BASOPHILS # BLD AUTO: 0.04 K/UL — SIGNIFICANT CHANGE UP (ref 0–0.2)
BASOPHILS NFR BLD AUTO: 0.5 % — SIGNIFICANT CHANGE UP (ref 0–2)
BUN SERPL-MCNC: 39 MG/DL — HIGH (ref 7–23)
CALCIUM SERPL-MCNC: 9.5 MG/DL — SIGNIFICANT CHANGE UP (ref 8.4–10.5)
CHLORIDE SERPL-SCNC: 105 MMOL/L — SIGNIFICANT CHANGE UP (ref 96–108)
CO2 SERPL-SCNC: 36 MMOL/L — HIGH (ref 22–31)
CREAT SERPL-MCNC: 1.25 MG/DL — SIGNIFICANT CHANGE UP (ref 0.5–1.3)
EOSINOPHIL # BLD AUTO: 0.8 K/UL — HIGH (ref 0–0.5)
EOSINOPHIL NFR BLD AUTO: 9.6 % — HIGH (ref 0–6)
GLUCOSE BLDC GLUCOMTR-MCNC: 141 MG/DL — HIGH (ref 70–99)
GLUCOSE BLDC GLUCOMTR-MCNC: 151 MG/DL — HIGH (ref 70–99)
GLUCOSE BLDC GLUCOMTR-MCNC: 161 MG/DL — HIGH (ref 70–99)
GLUCOSE SERPL-MCNC: 142 MG/DL — HIGH (ref 70–99)
HCT VFR BLD CALC: 27.5 % — LOW (ref 34.5–45)
HGB BLD-MCNC: 7.7 G/DL — LOW (ref 11.5–15.5)
IMM GRANULOCYTES NFR BLD AUTO: 0.6 % — SIGNIFICANT CHANGE UP (ref 0–1.5)
LYMPHOCYTES # BLD AUTO: 0.47 K/UL — LOW (ref 1–3.3)
LYMPHOCYTES # BLD AUTO: 5.6 % — LOW (ref 13–44)
MCHC RBC-ENTMCNC: 28 GM/DL — LOW (ref 32–36)
MCHC RBC-ENTMCNC: 31.6 PG — SIGNIFICANT CHANGE UP (ref 27–34)
MCV RBC AUTO: 112.7 FL — HIGH (ref 80–100)
MONOCYTES # BLD AUTO: 0.53 K/UL — SIGNIFICANT CHANGE UP (ref 0–0.9)
MONOCYTES NFR BLD AUTO: 6.3 % — SIGNIFICANT CHANGE UP (ref 2–14)
NEUTROPHILS # BLD AUTO: 6.46 K/UL — SIGNIFICANT CHANGE UP (ref 1.8–7.4)
NEUTROPHILS NFR BLD AUTO: 77.4 % — HIGH (ref 43–77)
NRBC # BLD: 0 /100 WBCS — SIGNIFICANT CHANGE UP (ref 0–0)
PLATELET # BLD AUTO: 152 K/UL — SIGNIFICANT CHANGE UP (ref 150–400)
POTASSIUM SERPL-MCNC: 4.8 MMOL/L — SIGNIFICANT CHANGE UP (ref 3.5–5.3)
POTASSIUM SERPL-SCNC: 4.8 MMOL/L — SIGNIFICANT CHANGE UP (ref 3.5–5.3)
RBC # BLD: 2.44 M/UL — LOW (ref 3.8–5.2)
RBC # FLD: 16.3 % — HIGH (ref 10.3–14.5)
SODIUM SERPL-SCNC: 147 MMOL/L — HIGH (ref 135–145)
WBC # BLD: 8.35 K/UL — SIGNIFICANT CHANGE UP (ref 3.8–10.5)
WBC # FLD AUTO: 8.35 K/UL — SIGNIFICANT CHANGE UP (ref 3.8–10.5)

## 2020-07-08 RX ADMIN — Medication 4 MILLIGRAM(S): at 22:06

## 2020-07-08 RX ADMIN — Medication 81 MILLIGRAM(S): at 12:08

## 2020-07-08 RX ADMIN — CHLORHEXIDINE GLUCONATE 1 APPLICATION(S): 213 SOLUTION TOPICAL at 12:09

## 2020-07-08 RX ADMIN — Medication 0.2 MILLIGRAM(S): at 22:06

## 2020-07-08 RX ADMIN — BIMATOPROST 1 DROP(S): 0.3 SOLUTION/ DROPS OPHTHALMIC at 22:06

## 2020-07-08 RX ADMIN — ATORVASTATIN CALCIUM 10 MILLIGRAM(S): 80 TABLET, FILM COATED ORAL at 22:05

## 2020-07-08 RX ADMIN — Medication 25 MICROGRAM(S): at 05:29

## 2020-07-08 RX ADMIN — Medication 50 MILLIGRAM(S): at 05:29

## 2020-07-08 RX ADMIN — Medication 300 MILLIGRAM(S): at 12:08

## 2020-07-08 RX ADMIN — Medication 1: at 17:55

## 2020-07-08 NOTE — PROGRESS NOTE ADULT - SUBJECTIVE AND OBJECTIVE BOX
Patient is a 74y old  Female who presents with a chief complaint of low blood count (08 Jul 2020 11:58)      SUBJECTIVE / OVERNIGHT EVENTS:    Patient seen and examined. eager to go home. does not want to stay. on 6L nc.      Vital Signs Last 24 Hrs  T(C): 36.8 (08 Jul 2020 08:17), Max: 37 (07 Jul 2020 16:06)  T(F): 98.3 (08 Jul 2020 08:17), Max: 98.6 (07 Jul 2020 16:06)  HR: 84 (08 Jul 2020 13:11) (81 - 89)  BP: 144/77 (08 Jul 2020 08:17) (104/72 - 144/77)  BP(mean): --  RR: 20 (08 Jul 2020 08:17) (20 - 20)  SpO2: 95% (08 Jul 2020 13:11) (91% - 95%)  I&O's Summary    07 Jul 2020 07:01  -  08 Jul 2020 07:00  --------------------------------------------------------  IN: 120 mL / OUT: 1500 mL / NET: -1380 mL        PE:  GENERAL: NAD, AAOx3, obese  HEAD:  Atraumatic, Normocephalic  CHEST/LUNG: decreased sounds 2/2 body habitus  HEART: Regular rate and rhythm; no murmur  ABDOMEN: Soft, Nontender, Nondistended; Bowel sounds present  EXTREMITIES:  2+ Peripheral Pulses, chronic lymphedema  SKIN: No rashes or lesions  NEURO: No focal deficits    LABS:                        7.7    8.35  )-----------( 152      ( 08 Jul 2020 12:00 )             27.5     07-08    147<H>  |  105  |  39<H>  ----------------------------<  142<H>  4.8   |  36<H>  |  1.25    Ca    9.5      08 Jul 2020 12:00  Mg     2.1     07-07        CAPILLARY BLOOD GLUCOSE      POCT Blood Glucose.: 141 mg/dL (08 Jul 2020 12:40)  POCT Blood Glucose.: 166 mg/dL (07 Jul 2020 21:09)  POCT Blood Glucose.: 155 mg/dL (07 Jul 2020 17:24)            RADIOLOGY & ADDITIONAL TESTS:    Imaging Personally Reviewed:  [x] YES  [ ] NO    Consultant(s) Notes Reviewed:  [x] YES  [ ] NO    MEDICATIONS  (STANDING):  allopurinol 300 milliGRAM(s) Oral daily  aspirin  chewable 81 milliGRAM(s) Oral daily  atorvastatin 10 milliGRAM(s) Oral at bedtime  bimatoprost 0.01% Ophthalmic Solution 1 Drop(s) Both EYES at bedtime  chlorhexidine 2% Cloths 1 Application(s) Topical daily  cloNIDine 0.2 milliGRAM(s) Oral at bedtime  dextrose 5%. 1000 milliLiter(s) (50 mL/Hr) IV Continuous <Continuous>  dextrose 5%. 1000 milliLiter(s) (100 mL/Hr) IV Continuous <Continuous>  dextrose 50% Injectable 12.5 Gram(s) IV Push once  dextrose 50% Injectable 25 Gram(s) IV Push once  dextrose 50% Injectable 25 Gram(s) IV Push once  doxazosin 4 milliGRAM(s) Oral at bedtime  enoxaparin Injectable 40 milliGRAM(s) SubCutaneous daily  insulin lispro (HumaLOG) corrective regimen sliding scale   SubCutaneous three times a day before meals  insulin lispro (HumaLOG) corrective regimen sliding scale   SubCutaneous at bedtime  levothyroxine 25 MICROGram(s) Oral daily  metoprolol succinate ER 50 milliGRAM(s) Oral daily  senna 2 Tablet(s) Oral at bedtime    MEDICATIONS  (PRN):  acetaminophen   Tablet .. 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

## 2020-07-08 NOTE — PROGRESS NOTE ADULT - ASSESSMENT
73 y/o female PMHx FREDI, obesity hypoventilation syndrome, DM2, HLD, LE lymphedema, on CPAP at night recently noncompliant, presents w symptomatic anemia    #Anemia  occult neg  GI recs appreciated-pt refused egd  monitor hb closely, will transfuse if Hb <7  heme cs noted, r/o MDS-bm bx with IR given body habitus  anticipate BMB by IR tomorrow  patient does not want to wait for bone marrow results and will fu outpt with hematology  heme following    # acute hypercapnic and hypoxic resp failure   # obesity hypoventilation  appreciate pulm cs  NIV at night  supplemental O2, titrate as tolerated    # LANE  stable    # HTN   on clonidine and toprol XL  oral lasix  TTE nml systolic fxn  CXR pulm edema    # hypothyroidism  cont synthroid 25mcg   tsh wnl    # DM2   hold oral meds  SSI  a1c 6.7    DVT ppx    PT eval

## 2020-07-08 NOTE — PROGRESS NOTE ADULT - SUBJECTIVE AND OBJECTIVE BOX
Follow-up Pulm Progress Note  Bharat Castellon MD  [phone]    AFebrile with intermittent hypoxemia - associated with movement/activity  Using BIPAP intermittently and overnight  MOre dyspneic at rest today and yesterday with paradoxical breathing noted  LE dopplers negative for prox DVT      Vital Signs Last 24 Hrs  T(C): 36.8 (08 Jul 2020 08:17), Max: 37 (07 Jul 2020 16:06)  T(F): 98.3 (08 Jul 2020 08:17), Max: 98.6 (07 Jul 2020 16:06)  HR: 81 (08 Jul 2020 10:27) (81 - 89)  BP: 144/77 (08 Jul 2020 08:17) (104/72 - 144/77)  BP(mean): --  RR: 20 (08 Jul 2020 08:17) (20 - 20)  SpO2: 92% (08 Jul 2020 10:27) (91% - 95%)                        7.2    7.56  )-----------( 151      ( 07 Jul 2020 10:20 )             25.7     07-07    150<H>  |  108  |  40<H>  ----------------------------<  104<H>  5.0   |  38<H>  |  1.27    Ca    9.7      07 Jul 2020 10:20  Mg     2.1     07-07    Physical Examination:  PULM: No wheeze or rhonchi; distant  CVS: Regular rate and rhythm, no murmurs, rubs, or gallops  ABD: Soft, non-tender  EXT:  No clubbing, cyanosis, or edema    RADIOLOGY REVIEWED  CXR:    CT chest:    TTE:

## 2020-07-08 NOTE — PROGRESS NOTE ADULT - ASSESSMENT
Obesity hypoventialtion symdrome: today at baseline PCO2 in 70's, compensated  Anemia  Patient with supine sats in low 80's tday: likely due to basilar atelectasis/poor inspiraion    7/8: Tachyneic with paradox: likely resp muscle fatigue      REC    BIPAP 12/5 today and overnight for fatigue  Chest CT NC once more comfortable  - CXR with ? increased effsuions  Deferr bone marrow in IR  V/Q scan - will not pursue since Ventilation arm not available - would lkely be inderminate  Continue supplemental O2: titrate to sat > 88%

## 2020-07-09 LAB
ANION GAP SERPL CALC-SCNC: 6 MMOL/L — SIGNIFICANT CHANGE UP (ref 5–17)
BUN SERPL-MCNC: 40 MG/DL — HIGH (ref 7–23)
CALCIUM SERPL-MCNC: 9.6 MG/DL — SIGNIFICANT CHANGE UP (ref 8.4–10.5)
CHLORIDE SERPL-SCNC: 103 MMOL/L — SIGNIFICANT CHANGE UP (ref 96–108)
CO2 SERPL-SCNC: 37 MMOL/L — HIGH (ref 22–31)
CREAT SERPL-MCNC: 1.34 MG/DL — HIGH (ref 0.5–1.3)
GLUCOSE BLDC GLUCOMTR-MCNC: 115 MG/DL — HIGH (ref 70–99)
GLUCOSE BLDC GLUCOMTR-MCNC: 117 MG/DL — HIGH (ref 70–99)
GLUCOSE BLDC GLUCOMTR-MCNC: 147 MG/DL — HIGH (ref 70–99)
GLUCOSE BLDC GLUCOMTR-MCNC: 187 MG/DL — HIGH (ref 70–99)
GLUCOSE SERPL-MCNC: 113 MG/DL — HIGH (ref 70–99)
HCT VFR BLD CALC: 28.7 % — LOW (ref 34.5–45)
HGB BLD-MCNC: 8.1 G/DL — LOW (ref 11.5–15.5)
MAGNESIUM SERPL-MCNC: 2.2 MG/DL — SIGNIFICANT CHANGE UP (ref 1.6–2.6)
MCHC RBC-ENTMCNC: 28.2 GM/DL — LOW (ref 32–36)
MCHC RBC-ENTMCNC: 31.9 PG — SIGNIFICANT CHANGE UP (ref 27–34)
MCV RBC AUTO: 113 FL — HIGH (ref 80–100)
NRBC # BLD: 0 /100 WBCS — SIGNIFICANT CHANGE UP (ref 0–0)
PLATELET # BLD AUTO: 159 K/UL — SIGNIFICANT CHANGE UP (ref 150–400)
POTASSIUM SERPL-MCNC: 5 MMOL/L — SIGNIFICANT CHANGE UP (ref 3.5–5.3)
POTASSIUM SERPL-SCNC: 5 MMOL/L — SIGNIFICANT CHANGE UP (ref 3.5–5.3)
RBC # BLD: 2.54 M/UL — LOW (ref 3.8–5.2)
RBC # FLD: 16.3 % — HIGH (ref 10.3–14.5)
SARS-COV-2 RNA SPEC QL NAA+PROBE: SIGNIFICANT CHANGE UP
SODIUM SERPL-SCNC: 146 MMOL/L — HIGH (ref 135–145)
WBC # BLD: 6.26 K/UL — SIGNIFICANT CHANGE UP (ref 3.8–10.5)
WBC # FLD AUTO: 6.26 K/UL — SIGNIFICANT CHANGE UP (ref 3.8–10.5)

## 2020-07-09 PROCEDURE — 71250 CT THORAX DX C-: CPT | Mod: 26

## 2020-07-09 RX ADMIN — Medication 300 MILLIGRAM(S): at 11:26

## 2020-07-09 RX ADMIN — Medication 50 MILLIGRAM(S): at 05:29

## 2020-07-09 RX ADMIN — ATORVASTATIN CALCIUM 10 MILLIGRAM(S): 80 TABLET, FILM COATED ORAL at 22:04

## 2020-07-09 RX ADMIN — Medication 25 MICROGRAM(S): at 05:29

## 2020-07-09 RX ADMIN — Medication 4 MILLIGRAM(S): at 22:04

## 2020-07-09 RX ADMIN — Medication 0.2 MILLIGRAM(S): at 22:04

## 2020-07-09 RX ADMIN — BIMATOPROST 1 DROP(S): 0.3 SOLUTION/ DROPS OPHTHALMIC at 22:09

## 2020-07-09 RX ADMIN — Medication 81 MILLIGRAM(S): at 11:26

## 2020-07-09 RX ADMIN — Medication 1: at 17:42

## 2020-07-09 RX ADMIN — CHLORHEXIDINE GLUCONATE 1 APPLICATION(S): 213 SOLUTION TOPICAL at 11:26

## 2020-07-09 NOTE — PROGRESS NOTE ADULT - ASSESSMENT
75 y/o female PMHx FREDI, obesity hypoventilation syndrome, DM2, HLD, LE lymphedema, on CPAP at night recently noncompliant, presents w symptomatic anemia    #Anemia  occult neg  GI recs appreciated-pt refused egd  monitor hb closely, will transfuse if Hb <7  heme cs noted, r/o MDS-bm bx with IR given body habitus  resp status limits IR biopsy  patient does not want to wait for bone marrow results and will fu outpt with hematology  heme following    # acute hypercapnic and hypoxic resp failure   # obesity hypoventilation  appreciate pulm cs, dw Dr. Castellon  cont bipap prn  supplemental O2, titrate as tolerated  check CT chest    # LANE  stable    # HTN   on clonidine and toprol XL  oral lasix  TTE nml systolic fxn  CXR pulm edema    # hypothyroidism  cont synthroid 25mcg   tsh wnl    # DM2   hold oral meds  SSI  a1c 6.7    DVT ppx    PT eval  Pt eager for discharge. Pt is not medically cleared. If patient elects to leave hospital must leave AMA.

## 2020-07-09 NOTE — PROGRESS NOTE ADULT - SUBJECTIVE AND OBJECTIVE BOX
Follow-up Pulm Progress Note  Bharat Castellon MD  [phone]    Afebrile  Bone marrow aspirate deferred today  Patient on nasal BIPAP 15/5: no dyspnea, though SOB without BIPAP  Patient adamant about going home and refuses further diagnostic tests: she wants to sign out AMA      Vital Signs Last 24 Hrs  T(C): 36.6 (09 Jul 2020 08:45), Max: 36.9 (08 Jul 2020 16:09)  T(F): 97.8 (09 Jul 2020 08:45), Max: 98.5 (08 Jul 2020 16:09)  HR: 76 (09 Jul 2020 10:25) (76 - 91)  BP: 132/73 (09 Jul 2020 08:45) (104/64 - 133/77)  BP(mean): --  RR: 18 (09 Jul 2020 08:45) (18 - 18)  SpO2: 95% (09 Jul 2020 10:25) (91% - 96%)                          8.1    6.26  )-----------( 159      ( 09 Jul 2020 09:20 )             28.7       07-09    146<H>  |  103  |  40<H>  ----------------------------<  113<H>  5.0   |  37<H>  |  1.34<H>    Ca    9.6      09 Jul 2020 09:20  Mg     2.2     07-09        Physical Examination:  GEN: Alert on Nasal BIPAP, no distress  PULM: No wheeze or rhonchi; distant  CVS: Regular rate and rhythm, no murmurs, rubs, or gallops  ABD: Soft, non-tender  EXT:  Bilateral LE edema    RADIOLOGY REVIEWED  CXR:    CT chest:    TTE:

## 2020-07-09 NOTE — PROGRESS NOTE ADULT - ASSESSMENT
Obesity hypoventialtion symdrome: today at baseline PCO2 in 70's, compensated  Anemia  Patient with supine sats in low 80's tday: likely due to basilar atelectasis/poor inspiraion    7/8: Tachyneic with paradox: likely resp muscle fatigue      REC    Would favor inpatient BIPAP for few days; however, patient is refusing further diagnostic tests and wants to go home AMA.

## 2020-07-10 LAB
ANION GAP SERPL CALC-SCNC: 4 MMOL/L — LOW (ref 5–17)
BUN SERPL-MCNC: 35 MG/DL — HIGH (ref 7–23)
CALCIUM SERPL-MCNC: 9.4 MG/DL — SIGNIFICANT CHANGE UP (ref 8.4–10.5)
CHLORIDE SERPL-SCNC: 105 MMOL/L — SIGNIFICANT CHANGE UP (ref 96–108)
CO2 SERPL-SCNC: 39 MMOL/L — HIGH (ref 22–31)
CREAT SERPL-MCNC: 1.18 MG/DL — SIGNIFICANT CHANGE UP (ref 0.5–1.3)
GLUCOSE BLDC GLUCOMTR-MCNC: 118 MG/DL — HIGH (ref 70–99)
GLUCOSE BLDC GLUCOMTR-MCNC: 153 MG/DL — HIGH (ref 70–99)
GLUCOSE BLDC GLUCOMTR-MCNC: 182 MG/DL — HIGH (ref 70–99)
GLUCOSE SERPL-MCNC: 103 MG/DL — HIGH (ref 70–99)
HCT VFR BLD CALC: SIGNIFICANT CHANGE UP (ref 34.5–45)
HGB BLD-MCNC: 7 G/DL — CRITICAL LOW (ref 11.5–15.5)
MAGNESIUM SERPL-MCNC: 1.9 MG/DL — SIGNIFICANT CHANGE UP (ref 1.6–2.6)
MCHC RBC-ENTMCNC: SIGNIFICANT CHANGE UP (ref 27–34)
MCHC RBC-ENTMCNC: SIGNIFICANT CHANGE UP (ref 32–36)
MCV RBC AUTO: SIGNIFICANT CHANGE UP (ref 80–100)
NRBC # BLD: 0 /100 WBCS — SIGNIFICANT CHANGE UP (ref 0–0)
PLATELET # BLD AUTO: 159 K/UL — SIGNIFICANT CHANGE UP (ref 150–400)
POTASSIUM SERPL-MCNC: 5 MMOL/L — SIGNIFICANT CHANGE UP (ref 3.5–5.3)
POTASSIUM SERPL-SCNC: 5 MMOL/L — SIGNIFICANT CHANGE UP (ref 3.5–5.3)
RBC # BLD: 2.23 M/UL — LOW (ref 3.8–5.2)
RBC # FLD: SIGNIFICANT CHANGE UP (ref 10.3–14.5)
SODIUM SERPL-SCNC: 148 MMOL/L — HIGH (ref 135–145)
WBC # BLD: 6.47 K/UL — SIGNIFICANT CHANGE UP (ref 3.8–10.5)
WBC # FLD AUTO: 6.47 K/UL — SIGNIFICANT CHANGE UP (ref 3.8–10.5)

## 2020-07-10 RX ORDER — ALPRAZOLAM 0.25 MG
0.25 TABLET ORAL ONCE
Refills: 0 | Status: DISCONTINUED | OUTPATIENT
Start: 2020-07-10 | End: 2020-07-13

## 2020-07-10 RX ORDER — FUROSEMIDE 40 MG
40 TABLET ORAL DAILY
Refills: 0 | Status: DISCONTINUED | OUTPATIENT
Start: 2020-07-10 | End: 2020-07-13

## 2020-07-10 RX ORDER — FUROSEMIDE 40 MG
40 TABLET ORAL DAILY
Refills: 0 | Status: DISCONTINUED | OUTPATIENT
Start: 2020-07-10 | End: 2020-07-10

## 2020-07-10 RX ADMIN — Medication 25 MICROGRAM(S): at 05:25

## 2020-07-10 RX ADMIN — ATORVASTATIN CALCIUM 10 MILLIGRAM(S): 80 TABLET, FILM COATED ORAL at 21:29

## 2020-07-10 RX ADMIN — CHLORHEXIDINE GLUCONATE 1 APPLICATION(S): 213 SOLUTION TOPICAL at 14:13

## 2020-07-10 RX ADMIN — Medication 4 MILLIGRAM(S): at 21:29

## 2020-07-10 RX ADMIN — Medication 300 MILLIGRAM(S): at 14:13

## 2020-07-10 RX ADMIN — Medication 40 MILLIGRAM(S): at 11:33

## 2020-07-10 RX ADMIN — Medication 0.2 MILLIGRAM(S): at 21:29

## 2020-07-10 RX ADMIN — Medication 81 MILLIGRAM(S): at 14:13

## 2020-07-10 RX ADMIN — SENNA PLUS 2 TABLET(S): 8.6 TABLET ORAL at 21:29

## 2020-07-10 RX ADMIN — BIMATOPROST 1 DROP(S): 0.3 SOLUTION/ DROPS OPHTHALMIC at 21:34

## 2020-07-10 RX ADMIN — Medication 50 MILLIGRAM(S): at 05:25

## 2020-07-10 NOTE — PROGRESS NOTE ADULT - SUBJECTIVE AND OBJECTIVE BOX
Follow-up Pulm Progress Note  Bharat Castellon MD  [phone]    CLinically improved today: tolerating BIPAP 15/5  CT CHest reviewed: patchy bilateral GG opacities with bilateral plleural effusions/compressive atelectasis: c/w CHF/pulm edema  Lasix 40 mg IV daily started        Vital Signs Last 24 Hrs  T(C): 37.1 (10 Jul 2020 07:51), Max: 37.1 (10 Jul 2020 07:51)  T(F): 98.8 (10 Jul 2020 07:51), Max: 98.8 (10 Jul 2020 07:51)  HR: 75 (10 Jul 2020 11:04) (55 - 88)  BP: 147/75 (10 Jul 2020 07:51) (103/60 - 147/75)  BP(mean): --  RR: 18 (10 Jul 2020 07:51) (18 - 23)  SpO2: 96% (10 Jul 2020 11:04) (92% - 98%)                        7.0    6.47  )-----------( 159      ( 10 Jul 2020 07:45 )             See note    07-10    148<H>  |  105  |  35<H>  ----------------------------<  103<H>  5.0   |  39<H>  |  1.18    Ca    9.4      10 Jul 2020 07:45  Mg     1.9     07-10      Physical Examination:  GEN: Alert on Nasal BIPAP, no distress  PULM: No wheeze or rhonchi; distant  CVS: Regular rate and rhythm, no murmurs, rubs, or gallops  ABD: Soft, non-tender  EXT:  Bilateral LE edema    RADIOLOGY REVIEWED  CXR:    CT chest:    TTE:

## 2020-07-10 NOTE — PROGRESS NOTE ADULT - ASSESSMENT
Obesity hypoventialtion symdrome: today at baseline PCO2 in 70's, compensated  Anemia  Patient with supine sats in low 80's tday: likely due to basilar atelectasis/poor inspiraion    7/8: Tachyneic with paradox: likely resp muscle fatigue  7/10: CHF with pulm edema/effusions on CT. No recent TTE    REC    Edema and effusions still likely COR pulmonale: GG opacities may represent L sided CHF  Continue lasix: negative balance as tolerated  Monitor for contraction alkalosis  PBNP ordered  Cards follow up - ? repeat TTE  COntinue nocturnal BIPAP and now prn day with some breaks

## 2020-07-10 NOTE — PROGRESS NOTE ADULT - ASSESSMENT
73 y/o female PMHx FREDI, obesity hypoventilation syndrome, DM2, HLD, LE lymphedema, on CPAP at night recently noncompliant, presents w symptomatic anemia    #Anemia  occult neg  GI recs appreciated-pt refused egd  monitor hb closely, will transfuse if Hb <7  heme cs noted, r/o MDS   patient will require BMB but currently refusing  as dw patient and , will pursue this outpatient as non urgent as patient's respiratory status tenuous     # acute hypercapnic and hypoxic resp failure   # obesity hypoventilation  # pleural effusions  appreciate pulm cs, dw Dr. Castellon  cont bipap prn, supplemental O2, titrate as tolerated  CT chest pleural effusions, ground glass, fu pulm recs    # LANE  stable    # HTN   on clonidine and toprol XL  TTE nml systolic fxn from 2/2020    # hypothyroidism  cont synthroid 25mcg   tsh wnl    # DM2   hold oral meds  SSI  a1c 6.7    DVT ppx    PT eval    plan of care dw patient and  Raffi over the phone  patient is not medically cleared for DC

## 2020-07-10 NOTE — PROGRESS NOTE ADULT - SUBJECTIVE AND OBJECTIVE BOX
Patient is a 74y old  Female who presents with a chief complaint of low blood count (09 Jul 2020 12:37)      SUBJECTIVE / OVERNIGHT EVENTS:    Patient seen and examined. co mild sob. states she is very adamant about going home. refusing bone marrow biopsy.  pt states she is embarrassed of her weight and does not want to be cleaned and turned in bed. she is uncomfortable.      Vital Signs Last 24 Hrs  T(C): 37.1 (10 Jul 2020 07:51), Max: 37.1 (10 Jul 2020 07:51)  T(F): 98.8 (10 Jul 2020 07:51), Max: 98.8 (10 Jul 2020 07:51)  HR: 55 (10 Jul 2020 07:51) (55 - 88)  BP: 147/75 (10 Jul 2020 07:51) (103/60 - 163/78)  BP(mean): --  RR: 18 (10 Jul 2020 07:51) (18 - 23)  SpO2: 96% (10 Jul 2020 07:51) (92% - 100%)  I&O's Summary    09 Jul 2020 07:01  -  10 Jul 2020 07:00  --------------------------------------------------------  IN: 480 mL / OUT: 800 mL / NET: -320 mL        PE:  GENERAL: NAD, AAOx3, obese  HEAD:  Atraumatic, Normocephalic  CHEST/LUNG: decreased sounds 2/2 body habitus  HEART: Regular rate and rhythm; no murmur  ABDOMEN: Soft, Nontender, Nondistended; Bowel sounds present  EXTREMITIES:  2+ Peripheral Pulses, chronic lymphedema  SKIN: No rashes or lesions  NEURO: No focal deficits    LABS:                        7.0    6.47  )-----------( 159      ( 10 Jul 2020 07:45 )             See note    07-10    148<H>  |  105  |  35<H>  ----------------------------<  103<H>  5.0   |  39<H>  |  1.18    Ca    9.4      10 Jul 2020 07:45  Mg     1.9     07-10        CAPILLARY BLOOD GLUCOSE      POCT Blood Glucose.: 118 mg/dL (10 Jul 2020 09:13)  POCT Blood Glucose.: 147 mg/dL (09 Jul 2020 21:10)  POCT Blood Glucose.: 187 mg/dL (09 Jul 2020 17:39)  POCT Blood Glucose.: 115 mg/dL (09 Jul 2020 12:18)            RADIOLOGY & ADDITIONAL TESTS:    Imaging Personally Reviewed:  [x] YES  [ ] NO    Consultant(s) Notes Reviewed:  [x] YES  [ ] NO    MEDICATIONS  (STANDING):  allopurinol 300 milliGRAM(s) Oral daily  ALPRAZolam 0.25 milliGRAM(s) Oral once  aspirin  chewable 81 milliGRAM(s) Oral daily  atorvastatin 10 milliGRAM(s) Oral at bedtime  bimatoprost 0.01% Ophthalmic Solution 1 Drop(s) Both EYES at bedtime  chlorhexidine 2% Cloths 1 Application(s) Topical daily  cloNIDine 0.2 milliGRAM(s) Oral at bedtime  dextrose 5%. 1000 milliLiter(s) (50 mL/Hr) IV Continuous <Continuous>  dextrose 5%. 1000 milliLiter(s) (100 mL/Hr) IV Continuous <Continuous>  dextrose 50% Injectable 12.5 Gram(s) IV Push once  dextrose 50% Injectable 25 Gram(s) IV Push once  dextrose 50% Injectable 25 Gram(s) IV Push once  doxazosin 4 milliGRAM(s) Oral at bedtime  enoxaparin Injectable 40 milliGRAM(s) SubCutaneous daily  insulin lispro (HumaLOG) corrective regimen sliding scale   SubCutaneous three times a day before meals  insulin lispro (HumaLOG) corrective regimen sliding scale   SubCutaneous at bedtime  levothyroxine 25 MICROGram(s) Oral daily  metoprolol succinate ER 50 milliGRAM(s) Oral daily  senna 2 Tablet(s) Oral at bedtime    MEDICATIONS  (PRN):  acetaminophen   Tablet .. 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

## 2020-07-11 LAB
ANION GAP SERPL CALC-SCNC: 6 MMOL/L — SIGNIFICANT CHANGE UP (ref 5–17)
BLD GP AB SCN SERPL QL: NEGATIVE — SIGNIFICANT CHANGE UP
BUN SERPL-MCNC: 32 MG/DL — HIGH (ref 7–23)
CALCIUM SERPL-MCNC: 9.7 MG/DL — SIGNIFICANT CHANGE UP (ref 8.4–10.5)
CHLORIDE SERPL-SCNC: 103 MMOL/L — SIGNIFICANT CHANGE UP (ref 96–108)
CO2 SERPL-SCNC: 36 MMOL/L — HIGH (ref 22–31)
CREAT SERPL-MCNC: 1.15 MG/DL — SIGNIFICANT CHANGE UP (ref 0.5–1.3)
GLUCOSE BLDC GLUCOMTR-MCNC: 110 MG/DL — HIGH (ref 70–99)
GLUCOSE BLDC GLUCOMTR-MCNC: 121 MG/DL — HIGH (ref 70–99)
GLUCOSE BLDC GLUCOMTR-MCNC: 140 MG/DL — HIGH (ref 70–99)
GLUCOSE BLDC GLUCOMTR-MCNC: 161 MG/DL — HIGH (ref 70–99)
GLUCOSE SERPL-MCNC: 100 MG/DL — HIGH (ref 70–99)
HCT VFR BLD CALC: 26.4 % — LOW (ref 34.5–45)
HGB BLD-MCNC: 7.4 G/DL — LOW (ref 11.5–15.5)
MAGNESIUM SERPL-MCNC: 1.9 MG/DL — SIGNIFICANT CHANGE UP (ref 1.6–2.6)
MCHC RBC-ENTMCNC: 28 GM/DL — LOW (ref 32–36)
MCHC RBC-ENTMCNC: 31.4 PG — SIGNIFICANT CHANGE UP (ref 27–34)
MCV RBC AUTO: 111.9 FL — HIGH (ref 80–100)
NRBC # BLD: 0 /100 WBCS — SIGNIFICANT CHANGE UP (ref 0–0)
NT-PROBNP SERPL-SCNC: 347 PG/ML — HIGH (ref 0–300)
PLATELET # BLD AUTO: 135 K/UL — LOW (ref 150–400)
POTASSIUM SERPL-MCNC: 4.6 MMOL/L — SIGNIFICANT CHANGE UP (ref 3.5–5.3)
POTASSIUM SERPL-SCNC: 4.6 MMOL/L — SIGNIFICANT CHANGE UP (ref 3.5–5.3)
RBC # BLD: 2.36 M/UL — LOW (ref 3.8–5.2)
RBC # FLD: 16.7 % — HIGH (ref 10.3–14.5)
RH IG SCN BLD-IMP: POSITIVE — SIGNIFICANT CHANGE UP
SODIUM SERPL-SCNC: 145 MMOL/L — SIGNIFICANT CHANGE UP (ref 135–145)
WBC # BLD: 7.47 K/UL — SIGNIFICANT CHANGE UP (ref 3.8–10.5)
WBC # FLD AUTO: 7.47 K/UL — SIGNIFICANT CHANGE UP (ref 3.8–10.5)

## 2020-07-11 RX ORDER — CHLORHEXIDINE GLUCONATE 213 G/1000ML
1 SOLUTION TOPICAL DAILY
Refills: 0 | Status: DISCONTINUED | OUTPATIENT
Start: 2020-07-11 | End: 2020-07-14

## 2020-07-11 RX ADMIN — Medication 0.2 MILLIGRAM(S): at 22:19

## 2020-07-11 RX ADMIN — CHLORHEXIDINE GLUCONATE 1 APPLICATION(S): 213 SOLUTION TOPICAL at 12:07

## 2020-07-11 RX ADMIN — Medication 4 MILLIGRAM(S): at 22:19

## 2020-07-11 RX ADMIN — Medication 40 MILLIGRAM(S): at 18:22

## 2020-07-11 RX ADMIN — SENNA PLUS 2 TABLET(S): 8.6 TABLET ORAL at 22:19

## 2020-07-11 RX ADMIN — Medication 25 MICROGRAM(S): at 06:43

## 2020-07-11 RX ADMIN — BIMATOPROST 1 DROP(S): 0.3 SOLUTION/ DROPS OPHTHALMIC at 22:22

## 2020-07-11 RX ADMIN — Medication 300 MILLIGRAM(S): at 12:09

## 2020-07-11 RX ADMIN — ATORVASTATIN CALCIUM 10 MILLIGRAM(S): 80 TABLET, FILM COATED ORAL at 22:19

## 2020-07-11 RX ADMIN — Medication 81 MILLIGRAM(S): at 12:09

## 2020-07-11 NOTE — PROVIDER CONTACT NOTE (OTHER) - ASSESSMENT
pt. spo2 76%; pt. states took O2 NC off to blow nose; pt. has NC back on; respiratory paged to put on PRN bipap
Patient sleeping at this time. Patient on BiPAP

## 2020-07-11 NOTE — PROGRESS NOTE ADULT - ASSESSMENT
Obesity hypoventialtion symdrome: today at baseline PCO2 in 70's, compensated  Anemia  Patient with supine sats in low 80's tday: likely due to basilar atelectasis/poor inspiraion    7/8: Tachyneic with paradox: likely resp muscle fatigue  7/10: CHF with pulm edema/effusions on CT. No recent TTE    REC    Edema and effusions still likely COR pulmonale: GG opacities may represent L sided CHF  Continue lasix: negative balance as tolerated  Monitor for contraction alkalosis  PBNP ordered  Cards follow up - ? repeat TTE  COntinue nocturnal BIPAP and now prn day with some breaks Obesity hypoventialtion symdrome: today at baseline PCO2 in 70's, compensated  Anemia  Patient with supine sats in low 80's tday: likely due to basilar atelectasis/poor inspiraion    7/8: Tachyneic with paradox: likely resp muscle fatigue  7/10: CHF with pulm edema/effusions on CT. No recent TTE  7/11: looks a bit better, though desaturates on nasal O2. PBNP mildly elevated    REC    Edema and effusions still likely COR pulmonale: GG opacities may represent L sided CHF  Continue lasix: negative balance as tolerated  Monitor for contraction alkalosis  PBNP ordered  Cards follow up - ? repeat TTE  COntinue nocturnal BIPAP and now prn day with some breaks

## 2020-07-11 NOTE — PROVIDER CONTACT NOTE (OTHER) - SITUATION
Pt. desaturating on 4L NC SPO2 76%
Tele tech reported HR 45-52 between 3-4 am
Pt has meds due and is NPO and has bipap

## 2020-07-11 NOTE — PROGRESS NOTE ADULT - SUBJECTIVE AND OBJECTIVE BOX
Patient is a 74y old  Female who presents with a chief complaint of low blood count (10 Jul 2020 15:01)      SUBJECTIVE / OVERNIGHT EVENTS:    Patient seen and examined. feels well this morning. co sob. on NC.      Vital Signs Last 24 Hrs  T(C): 36.6 (11 Jul 2020 08:56), Max: 37.1 (10 Jul 2020 16:28)  T(F): 97.9 (11 Jul 2020 08:56), Max: 98.8 (10 Jul 2020 16:28)  HR: 71 (11 Jul 2020 08:56) (71 - 84)  BP: 122/73 (11 Jul 2020 08:56) (116/63 - 133/67)  BP(mean): --  RR: 18 (11 Jul 2020 08:56) (18 - 20)  SpO2: 98% (11 Jul 2020 08:56) (86% - 98%)  I&O's Summary    10 Jul 2020 07:01  -  11 Jul 2020 07:00  --------------------------------------------------------  IN: 380 mL / OUT: 1100 mL / NET: -720 mL        PE:  GENERAL: NAD, AAOx3, obese  HEAD:  Atraumatic, Normocephalic  CHEST/LUNG: decreased sounds 2/2 body habitus  HEART: Regular rate and rhythm; no murmur  ABDOMEN: Soft, Nontender, Nondistended; Bowel sounds present  EXTREMITIES:  2+ Peripheral Pulses, chronic lymphedema  SKIN: No rashes or lesions  NEURO: No focal deficits    LABS:                        7.4    7.47  )-----------( 135      ( 11 Jul 2020 09:41 )             26.4     07-11    145  |  103  |  32<H>  ----------------------------<  100<H>  4.6   |  36<H>  |  1.15    Ca    9.7      11 Jul 2020 09:41  Mg     1.9     07-11        CAPILLARY BLOOD GLUCOSE      POCT Blood Glucose.: 110 mg/dL (11 Jul 2020 08:27)  POCT Blood Glucose.: 182 mg/dL (10 Jul 2020 21:17)  POCT Blood Glucose.: 153 mg/dL (10 Jul 2020 17:51)            RADIOLOGY & ADDITIONAL TESTS:    Imaging Personally Reviewed:  [x] YES  [ ] NO    Consultant(s) Notes Reviewed:  [x] YES  [ ] NO    MEDICATIONS  (STANDING):  allopurinol 300 milliGRAM(s) Oral daily  ALPRAZolam 0.25 milliGRAM(s) Oral once  aspirin  chewable 81 milliGRAM(s) Oral daily  atorvastatin 10 milliGRAM(s) Oral at bedtime  bimatoprost 0.01% Ophthalmic Solution 1 Drop(s) Both EYES at bedtime  chlorhexidine 2% Cloths 1 Application(s) Topical daily  cloNIDine 0.2 milliGRAM(s) Oral at bedtime  dextrose 5%. 1000 milliLiter(s) (50 mL/Hr) IV Continuous <Continuous>  dextrose 5%. 1000 milliLiter(s) (100 mL/Hr) IV Continuous <Continuous>  dextrose 50% Injectable 12.5 Gram(s) IV Push once  dextrose 50% Injectable 25 Gram(s) IV Push once  dextrose 50% Injectable 25 Gram(s) IV Push once  doxazosin 4 milliGRAM(s) Oral at bedtime  enoxaparin Injectable 40 milliGRAM(s) SubCutaneous daily  furosemide   Injectable 40 milliGRAM(s) IV Push daily  insulin lispro (HumaLOG) corrective regimen sliding scale   SubCutaneous three times a day before meals  insulin lispro (HumaLOG) corrective regimen sliding scale   SubCutaneous at bedtime  levothyroxine 25 MICROGram(s) Oral daily  metoprolol succinate ER 50 milliGRAM(s) Oral daily  senna 2 Tablet(s) Oral at bedtime    MEDICATIONS  (PRN):  acetaminophen   Tablet .. 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

## 2020-07-11 NOTE — PROGRESS NOTE ADULT - SUBJECTIVE AND OBJECTIVE BOX
Follow-up Pulm Progress Note  Bharat Castellon MD  [phone]    Afebrile  Tolerating BIPAP        Vital Signs Last 24 Hrs  T(C): 36.6 (11 Jul 2020 08:56), Max: 37.1 (10 Jul 2020 16:28)  T(F): 97.9 (11 Jul 2020 08:56), Max: 98.8 (10 Jul 2020 16:28)  HR: 71 (11 Jul 2020 08:56) (71 - 84)  BP: 122/73 (11 Jul 2020 08:56) (116/63 - 133/67)  BP(mean): --  RR: 18 (11 Jul 2020 08:56) (18 - 20)  SpO2: 98% (11 Jul 2020 08:56) (86% - 98%)                          7.4    7.47  )-----------( 135      ( 11 Jul 2020 09:41 )             26.4       07-11    145  |  103  |  32<H>  ----------------------------<  100<H>  4.6   |  36<H>  |  1.15    Ca    9.7      11 Jul 2020 09:41  Mg     1.9     07-11        Physical Examination:  GEN: Alert on Nasal BIPAP, no distress  PULM: No wheeze or rhonchi; distant  CVS: Regular rate and rhythm, no murmurs, rubs, or gallops  ABD: Soft, non-tender  EXT:  Bilateral LE edema    RADIOLOGY REVIEWED  CXR:    CT chest:    TTE: Follow-up Pulm Progress Note  Bharat Castellon MD  [phone]    Afebrile  Tolerating BIPAP  At time of visit, about t eat lunch: on 6 liters nasal O2 with sat 80%, relat aymptomatic  On daily IV lasix        Vital Signs Last 24 Hrs  T(C): 36.6 (11 Jul 2020 08:56), Max: 37.1 (10 Jul 2020 16:28)  T(F): 97.9 (11 Jul 2020 08:56), Max: 98.8 (10 Jul 2020 16:28)  HR: 71 (11 Jul 2020 08:56) (71 - 84)  BP: 122/73 (11 Jul 2020 08:56) (116/63 - 133/67)  BP(mean): --  RR: 18 (11 Jul 2020 08:56) (18 - 20)  SpO2: 98% (11 Jul 2020 08:56) (86% - 98%)                          7.4    7.47  )-----------( 135      ( 11 Jul 2020 09:41 )             26.4       07-11    145  |  103  |  32<H>  ----------------------------<  100<H>  4.6   |  36<H>  |  1.15    Ca    9.7      11 Jul 2020 09:41  Mg     1.9     07-11        Physical Examination:  GEN: Alert on Nasal BIPAP, no distress  PULM: No wheeze or rhonchi; distant  CVS: Regular rate and rhythm, no murmurs, rubs, or gallops  ABD: Soft, non-tender  EXT:  Bilateral LE edema    RADIOLOGY REVIEWED  CXR:    CT chest:    TTE:

## 2020-07-11 NOTE — PROVIDER CONTACT NOTE (OTHER) - ACTION/TREATMENT ORDERED:
Pt vitals stable. Pt should be NPO and bipap should stay on. Ok not to give PO meds.
pt. has PRN bipap; respiratory paged
NP aware. Hold metoprolol in AM if patient remains bradycardic.

## 2020-07-11 NOTE — PROGRESS NOTE ADULT - ASSESSMENT
73 y/o female PMHx FREDI, obesity hypoventilation syndrome, DM2, HLD, LE lymphedema, on CPAP at night recently noncompliant, presents w symptomatic anemia    #Anemia  occult neg  GI recs appreciated-pt refused egd  monitor hb closely, will transfuse if Hb <7  heme cs noted, r/o MDS   patient will require BMB but currently refusing, can pursue this as outpatient     # acute hypercapnic and hypoxic resp failure   # obesity hypoventilation  # pleural effusions  appreciate pulm recs  cont bipap prn, supplemental O2, titrate as tolerated  CT chest pleural effusions, ground glass  cont lasix 40mg IV qd  check TTE, TTE nml systolic fxn from 2/2020    # LANE  stable    # HTN   on clonidine and toprol XL    # hypothyroidism  cont synthroid 25mcg   tsh wnl    # DM2   hold oral meds  SSI  a1c 6.7    DVT ppx    PT eval

## 2020-07-12 LAB
ALBUMIN SERPL ELPH-MCNC: 3.2 G/DL — LOW (ref 3.3–5)
ALP SERPL-CCNC: 66 U/L — SIGNIFICANT CHANGE UP (ref 40–120)
ALT FLD-CCNC: 13 U/L — SIGNIFICANT CHANGE UP (ref 10–45)
ANION GAP SERPL CALC-SCNC: 7 MMOL/L — SIGNIFICANT CHANGE UP (ref 5–17)
AST SERPL-CCNC: 17 U/L — SIGNIFICANT CHANGE UP (ref 10–40)
BASOPHILS # BLD AUTO: 0.07 K/UL — SIGNIFICANT CHANGE UP (ref 0–0.2)
BASOPHILS NFR BLD AUTO: 0.9 % — SIGNIFICANT CHANGE UP (ref 0–2)
BILIRUB SERPL-MCNC: 0.8 MG/DL — SIGNIFICANT CHANGE UP (ref 0.2–1.2)
BUN SERPL-MCNC: 29 MG/DL — HIGH (ref 7–23)
CALCIUM SERPL-MCNC: 9.9 MG/DL — SIGNIFICANT CHANGE UP (ref 8.4–10.5)
CHLORIDE SERPL-SCNC: 100 MMOL/L — SIGNIFICANT CHANGE UP (ref 96–108)
CO2 SERPL-SCNC: 39 MMOL/L — HIGH (ref 22–31)
CREAT SERPL-MCNC: 1.21 MG/DL — SIGNIFICANT CHANGE UP (ref 0.5–1.3)
EOSINOPHIL # BLD AUTO: 0.65 K/UL — HIGH (ref 0–0.5)
EOSINOPHIL NFR BLD AUTO: 7.9 % — HIGH (ref 0–6)
GIANT PLATELETS BLD QL SMEAR: PRESENT — SIGNIFICANT CHANGE UP
GLUCOSE BLDC GLUCOMTR-MCNC: 126 MG/DL — HIGH (ref 70–99)
GLUCOSE BLDC GLUCOMTR-MCNC: 136 MG/DL — HIGH (ref 70–99)
GLUCOSE BLDC GLUCOMTR-MCNC: 156 MG/DL — HIGH (ref 70–99)
GLUCOSE BLDC GLUCOMTR-MCNC: 159 MG/DL — HIGH (ref 70–99)
GLUCOSE SERPL-MCNC: 113 MG/DL — HIGH (ref 70–99)
HCT VFR BLD CALC: 26.1 % — LOW (ref 34.5–45)
HGB BLD-MCNC: 7.6 G/DL — LOW (ref 11.5–15.5)
LYMPHOCYTES # BLD AUTO: 0.87 K/UL — LOW (ref 1–3.3)
LYMPHOCYTES # BLD AUTO: 10.5 % — LOW (ref 13–44)
MANUAL SMEAR VERIFICATION: SIGNIFICANT CHANGE UP
MCHC RBC-ENTMCNC: 29.1 GM/DL — LOW (ref 32–36)
MCHC RBC-ENTMCNC: 31.7 PG — SIGNIFICANT CHANGE UP (ref 27–34)
MCV RBC AUTO: 108.8 FL — HIGH (ref 80–100)
MONOCYTES # BLD AUTO: 0.44 K/UL — SIGNIFICANT CHANGE UP (ref 0–0.9)
MONOCYTES NFR BLD AUTO: 5.3 % — SIGNIFICANT CHANGE UP (ref 2–14)
NEUTROPHILS # BLD AUTO: 6.23 K/UL — SIGNIFICANT CHANGE UP (ref 1.8–7.4)
NEUTROPHILS NFR BLD AUTO: 75.4 % — SIGNIFICANT CHANGE UP (ref 43–77)
PLAT MORPH BLD: ABNORMAL
PLATELET # BLD AUTO: 170 K/UL — SIGNIFICANT CHANGE UP (ref 150–400)
POTASSIUM SERPL-MCNC: 4.3 MMOL/L — SIGNIFICANT CHANGE UP (ref 3.5–5.3)
POTASSIUM SERPL-SCNC: 4.3 MMOL/L — SIGNIFICANT CHANGE UP (ref 3.5–5.3)
PROT SERPL-MCNC: 6.4 G/DL — SIGNIFICANT CHANGE UP (ref 6–8.3)
RBC # BLD: 2.4 M/UL — LOW (ref 3.8–5.2)
RBC # FLD: 16.8 % — HIGH (ref 10.3–14.5)
RBC BLD AUTO: SIGNIFICANT CHANGE UP
SODIUM SERPL-SCNC: 146 MMOL/L — HIGH (ref 135–145)
WBC # BLD: 8.26 K/UL — SIGNIFICANT CHANGE UP (ref 3.8–10.5)
WBC # FLD AUTO: 8.26 K/UL — SIGNIFICANT CHANGE UP (ref 3.8–10.5)

## 2020-07-12 RX ADMIN — Medication 25 MICROGRAM(S): at 05:09

## 2020-07-12 RX ADMIN — Medication 81 MILLIGRAM(S): at 13:55

## 2020-07-12 RX ADMIN — SENNA PLUS 2 TABLET(S): 8.6 TABLET ORAL at 21:32

## 2020-07-12 RX ADMIN — Medication 300 MILLIGRAM(S): at 13:55

## 2020-07-12 RX ADMIN — Medication 0.2 MILLIGRAM(S): at 21:32

## 2020-07-12 RX ADMIN — Medication 50 MILLIGRAM(S): at 05:09

## 2020-07-12 RX ADMIN — ATORVASTATIN CALCIUM 10 MILLIGRAM(S): 80 TABLET, FILM COATED ORAL at 21:31

## 2020-07-12 RX ADMIN — Medication 4 MILLIGRAM(S): at 21:32

## 2020-07-12 RX ADMIN — BIMATOPROST 1 DROP(S): 0.3 SOLUTION/ DROPS OPHTHALMIC at 21:32

## 2020-07-12 RX ADMIN — Medication 1: at 18:03

## 2020-07-12 NOTE — PROGRESS NOTE ADULT - ASSESSMENT
73 y/o female PMHx FREDI, obesity hypoventilation syndrome, DM2, HLD, LE lymphedema, on CPAP at night recently noncompliant, presents w symptomatic anemia    #Anemia  occult neg  GI recs appreciated-pt refused egd  monitor hb closely, will transfuse if Hb <7  heme cs noted, r/o MDS   patient will require BMB but currently refusing, can pursue this as outpatient     # acute hypercapnic and hypoxic resp failure   # obesity hypoventilation  # pleural effusions  appreciate pulm recs  cont bipap prn, supplemental O2, titrate as tolerated  CT chest pleural effusions, ground glass  cont lasix 40mg IV qd  check TTE, TTE nml systolic fxn from 2/2020    # LANE  stable    # HTN   on clonidine and toprol XL    # hypothyroidism  cont synthroid 25mcg   tsh wnl    # DM2   hold oral meds  SSI  a1c 6.7    DVT ppx lovenox

## 2020-07-12 NOTE — PROGRESS NOTE ADULT - SUBJECTIVE AND OBJECTIVE BOX
Patient is a 74y old  Female who presents with a chief complaint of low blood count (11 Jul 2020 13:15)      SUBJECTIVE / OVERNIGHT EVENTS:    Patient seen and examined.   feels better. stable on 6L. no cp sob.    Vital Signs Last 24 Hrs  T(C): 36.8 (12 Jul 2020 07:50), Max: 36.8 (12 Jul 2020 07:50)  T(F): 98.3 (12 Jul 2020 07:50), Max: 98.3 (12 Jul 2020 07:50)  HR: 76 (12 Jul 2020 07:50) (76 - 88)  BP: 139/76 (12 Jul 2020 07:50) (109/65 - 139/76)  BP(mean): --  RR: 18 (12 Jul 2020 07:50) (18 - 18)  SpO2: 95% (12 Jul 2020 07:50) (91% - 96%)  I&O's Summary    11 Jul 2020 07:01  -  12 Jul 2020 07:00  --------------------------------------------------------  IN: 0 mL / OUT: 400 mL / NET: -400 mL        PE:  GENERAL: NAD, AAOx3, obese  HEAD:  Atraumatic, Normocephalic  CHEST/LUNG: decreased sounds 2/2 body habitus  HEART: Regular rate and rhythm; no murmur  ABDOMEN: Soft, Nontender, Nondistended; Bowel sounds present  EXTREMITIES:  2+ Peripheral Pulses, chronic lymphedema  SKIN: No rashes or lesions  NEURO: No focal deficits    LABS:                        7.6    8.26  )-----------( 170      ( 12 Jul 2020 09:07 )             26.1     07-12    146<H>  |  100  |  29<H>  ----------------------------<  113<H>  4.3   |  39<H>  |  1.21    Ca    9.9      12 Jul 2020 09:07  Mg     1.9     07-11    TPro  6.4  /  Alb  3.2<L>  /  TBili  0.8  /  DBili  x   /  AST  17  /  ALT  13  /  AlkPhos  66  07-12      CAPILLARY BLOOD GLUCOSE      POCT Blood Glucose.: 126 mg/dL (12 Jul 2020 08:28)  POCT Blood Glucose.: 161 mg/dL (11 Jul 2020 21:44)  POCT Blood Glucose.: 140 mg/dL (11 Jul 2020 18:34)  POCT Blood Glucose.: 121 mg/dL (11 Jul 2020 12:39)            RADIOLOGY & ADDITIONAL TESTS:    Imaging Personally Reviewed:  [x] YES  [ ] NO    Consultant(s) Notes Reviewed:  [x] YES  [ ] NO    MEDICATIONS  (STANDING):  allopurinol 300 milliGRAM(s) Oral daily  ALPRAZolam 0.25 milliGRAM(s) Oral once  aspirin  chewable 81 milliGRAM(s) Oral daily  atorvastatin 10 milliGRAM(s) Oral at bedtime  bimatoprost 0.01% Ophthalmic Solution 1 Drop(s) Both EYES at bedtime  chlorhexidine 2% Cloths 1 Application(s) Topical daily  cloNIDine 0.2 milliGRAM(s) Oral at bedtime  dextrose 5%. 1000 milliLiter(s) (50 mL/Hr) IV Continuous <Continuous>  dextrose 5%. 1000 milliLiter(s) (100 mL/Hr) IV Continuous <Continuous>  dextrose 50% Injectable 12.5 Gram(s) IV Push once  dextrose 50% Injectable 25 Gram(s) IV Push once  dextrose 50% Injectable 25 Gram(s) IV Push once  doxazosin 4 milliGRAM(s) Oral at bedtime  enoxaparin Injectable 40 milliGRAM(s) SubCutaneous daily  furosemide   Injectable 40 milliGRAM(s) IV Push daily  insulin lispro (HumaLOG) corrective regimen sliding scale   SubCutaneous three times a day before meals  insulin lispro (HumaLOG) corrective regimen sliding scale   SubCutaneous at bedtime  levothyroxine 25 MICROGram(s) Oral daily  metoprolol succinate ER 50 milliGRAM(s) Oral daily  senna 2 Tablet(s) Oral at bedtime    MEDICATIONS  (PRN):  acetaminophen   Tablet .. 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

## 2020-07-13 LAB
ANION GAP SERPL CALC-SCNC: 6 MMOL/L — SIGNIFICANT CHANGE UP (ref 5–17)
BUN SERPL-MCNC: 31 MG/DL — HIGH (ref 7–23)
CALCIUM SERPL-MCNC: 9.9 MG/DL — SIGNIFICANT CHANGE UP (ref 8.4–10.5)
CHLORIDE SERPL-SCNC: 97 MMOL/L — SIGNIFICANT CHANGE UP (ref 96–108)
CO2 SERPL-SCNC: 42 MMOL/L — HIGH (ref 22–31)
CREAT SERPL-MCNC: 1.4 MG/DL — HIGH (ref 0.5–1.3)
GLUCOSE BLDC GLUCOMTR-MCNC: 113 MG/DL — HIGH (ref 70–99)
GLUCOSE BLDC GLUCOMTR-MCNC: 130 MG/DL — HIGH (ref 70–99)
GLUCOSE BLDC GLUCOMTR-MCNC: 137 MG/DL — HIGH (ref 70–99)
GLUCOSE BLDC GLUCOMTR-MCNC: 207 MG/DL — HIGH (ref 70–99)
GLUCOSE BLDC GLUCOMTR-MCNC: 214 MG/DL — HIGH (ref 70–99)
GLUCOSE SERPL-MCNC: 104 MG/DL — HIGH (ref 70–99)
HCT VFR BLD CALC: 27.1 % — LOW (ref 34.5–45)
HGB BLD-MCNC: 7.7 G/DL — LOW (ref 11.5–15.5)
MCHC RBC-ENTMCNC: 28.4 GM/DL — LOW (ref 32–36)
MCHC RBC-ENTMCNC: 31.6 PG — SIGNIFICANT CHANGE UP (ref 27–34)
MCV RBC AUTO: 111.1 FL — HIGH (ref 80–100)
NRBC # BLD: 0 /100 WBCS — SIGNIFICANT CHANGE UP (ref 0–0)
PLATELET # BLD AUTO: 195 K/UL — SIGNIFICANT CHANGE UP (ref 150–400)
POTASSIUM SERPL-MCNC: 4.2 MMOL/L — SIGNIFICANT CHANGE UP (ref 3.5–5.3)
POTASSIUM SERPL-SCNC: 4.2 MMOL/L — SIGNIFICANT CHANGE UP (ref 3.5–5.3)
RBC # BLD: 2.44 M/UL — LOW (ref 3.8–5.2)
RBC # FLD: 16.7 % — HIGH (ref 10.3–14.5)
SODIUM SERPL-SCNC: 145 MMOL/L — SIGNIFICANT CHANGE UP (ref 135–145)
WBC # BLD: 7.98 K/UL — SIGNIFICANT CHANGE UP (ref 3.8–10.5)
WBC # FLD AUTO: 7.98 K/UL — SIGNIFICANT CHANGE UP (ref 3.8–10.5)

## 2020-07-13 PROCEDURE — 93306 TTE W/DOPPLER COMPLETE: CPT | Mod: 26

## 2020-07-13 RX ADMIN — Medication 4 MILLIGRAM(S): at 21:45

## 2020-07-13 RX ADMIN — Medication 300 MILLIGRAM(S): at 12:09

## 2020-07-13 RX ADMIN — Medication 81 MILLIGRAM(S): at 12:09

## 2020-07-13 RX ADMIN — SENNA PLUS 2 TABLET(S): 8.6 TABLET ORAL at 21:45

## 2020-07-13 RX ADMIN — Medication 50 MILLIGRAM(S): at 05:16

## 2020-07-13 RX ADMIN — Medication 25 MICROGRAM(S): at 05:16

## 2020-07-13 RX ADMIN — ATORVASTATIN CALCIUM 10 MILLIGRAM(S): 80 TABLET, FILM COATED ORAL at 21:45

## 2020-07-13 RX ADMIN — Medication 0.2 MILLIGRAM(S): at 21:45

## 2020-07-13 RX ADMIN — BIMATOPROST 1 DROP(S): 0.3 SOLUTION/ DROPS OPHTHALMIC at 21:45

## 2020-07-13 NOTE — PROGRESS NOTE ADULT - SUBJECTIVE AND OBJECTIVE BOX
Follow-up Pulm Progress Note  Bharat Castellon MD  [phone]    Afebrile  Lasix held today: creat increase to 1.4 with -1900 ml urine output prior  HCO3 42  OOB in chair today: comfortable on nasal O2 - now using BIPAP overnight      Vital Signs Last 24 Hrs  T(C): 36.1 (13 Jul 2020 07:46), Max: 36.8 (13 Jul 2020 00:17)  T(F): 96.9 (13 Jul 2020 07:46), Max: 98.2 (13 Jul 2020 00:17)  HR: 79 (13 Jul 2020 09:28) (79 - 92)  BP: 143/80 (13 Jul 2020 07:46) (96/57 - 143/80)  BP(mean): --  RR: 18 (13 Jul 2020 07:46) (18 - 18)  SpO2: 91% (13 Jul 2020 09:28) (90% - 96%)                          7.7    7.98  )-----------( 195      ( 13 Jul 2020 08:33 )             27.1       07-13    145  |  97  |  31<H>  ----------------------------<  104<H>  4.2   |  42<H>  |  1.40<H>    Ca    9.9      13 Jul 2020 08:33    TPro  6.4  /  Alb  3.2<L>  /  TBili  0.8  /  DBili  x   /  AST  17  /  ALT  13  /  AlkPhos  66  07-12    Physical Examination:  GEN: Alert on Nasal BIPAP, no distress  PULM: No wheeze or rhonchi; distant  CVS: Regular rate and rhythm, no murmurs, rubs, or gallops  ABD: Soft, non-tender  EXT:  Bilateral LE edema    RADIOLOGY REVIEWED  CXR:    CT chest:    PROCEDURE DATE:  07/09/2020        INTERPRETATION:  CLINICAL INFORMATION: Shortness of breath. Lymphedema.    COMPARISON: Chest x-ray 7/5/2020.    PROCEDURE:   CT of the Chest was performed without intravenous contrast.  Sagittal and coronal reformats were performed.    FINDINGS:    LUNGS, AIRWAYS, AND PLEURA: Bilateral groundglass opacities.  Bilateral pleural effusions with adjacent compressive atelectasis. The airways are unremarkable.    MEDIASTINUM AND EMBER: The chest lymph nodes measure less than 10 mm in the short axis. The esophagus and thyroid gland are unremarkable.    VESSELS: Aorta is normal in caliber. Aortic calcifications. The pulmonary artery is unremarkable.    HEART: Cardiomegaly. Trace pericardial effusion. Coronary artery calcifications.    CHEST WALL AND LOWER NECK: Partially visualized cervical  lymph node measures 2.4 x 1.8 cm. Multiple lymph nodes in the left axillary region.    VISUALIZED UPPER ABDOMEN: Within normal limits.    BONES: Degenerative changes. Calcified loose body posterior to the T3 vertebral body, right of midline, appears to narrow the canal at this level.    IMPRESSION:   1.  Bilateral pleural effusions and lower lobe passive atelectasis.  2.  Bilateral groundglass opacities could occur in the setting of pulmonary edema, however, other etiologies could look similar.      TTE:

## 2020-07-13 NOTE — PROGRESS NOTE ADULT - SUBJECTIVE AND OBJECTIVE BOX
Patient is a 74y old  Female who presents with a chief complaint of low blood count (12 Jul 2020 11:49)      SUBJECTIVE / OVERNIGHT EVENTS:    Patient seen and examined. states she feels well. denies cp sob. wants to go home. does not want to stay in hospital any longer. on 6L.       Vital Signs Last 24 Hrs  T(C): 36.1 (13 Jul 2020 07:46), Max: 36.8 (13 Jul 2020 00:17)  T(F): 96.9 (13 Jul 2020 07:46), Max: 98.2 (13 Jul 2020 00:17)  HR: 79 (13 Jul 2020 09:28) (79 - 92)  BP: 143/80 (13 Jul 2020 07:46) (96/57 - 143/80)  BP(mean): --  RR: 18 (13 Jul 2020 07:46) (18 - 18)  SpO2: 91% (13 Jul 2020 09:28) (90% - 96%)  I&O's Summary    12 Jul 2020 07:01  -  13 Jul 2020 07:00  --------------------------------------------------------  IN: 0 mL / OUT: 1900 mL / NET: -1900 mL        PE:  GENERAL: NAD, AAOx3, obese  HEAD:  Atraumatic, Normocephalic  CHEST/LUNG: decreased sounds 2/2 body habitus  HEART: Regular rate and rhythm; no murmur  ABDOMEN: Soft, Nontender, Nondistended; Bowel sounds present  EXTREMITIES:  2+ Peripheral Pulses, chronic lymphedema  SKIN: No rashes or lesions  NEURO: No focal deficits    LABS:                        7.7    7.98  )-----------( 195      ( 13 Jul 2020 08:33 )             27.1     07-13    145  |  97  |  31<H>  ----------------------------<  104<H>  4.2   |  42<H>  |  1.40<H>    Ca    9.9      13 Jul 2020 08:33    TPro  6.4  /  Alb  3.2<L>  /  TBili  0.8  /  DBili  x   /  AST  17  /  ALT  13  /  AlkPhos  66  07-12      CAPILLARY BLOOD GLUCOSE      POCT Blood Glucose.: 137 mg/dL (13 Jul 2020 12:07)  POCT Blood Glucose.: 113 mg/dL (13 Jul 2020 08:11)  POCT Blood Glucose.: 156 mg/dL (12 Jul 2020 21:09)  POCT Blood Glucose.: 159 mg/dL (12 Jul 2020 17:54)  POCT Blood Glucose.: 136 mg/dL (12 Jul 2020 13:59)            RADIOLOGY & ADDITIONAL TESTS:    Imaging Personally Reviewed:  [x] YES  [ ] NO    Consultant(s) Notes Reviewed:  [x] YES  [ ] NO    MEDICATIONS  (STANDING):  allopurinol 300 milliGRAM(s) Oral daily  ALPRAZolam 0.25 milliGRAM(s) Oral once  aspirin  chewable 81 milliGRAM(s) Oral daily  atorvastatin 10 milliGRAM(s) Oral at bedtime  bimatoprost 0.01% Ophthalmic Solution 1 Drop(s) Both EYES at bedtime  chlorhexidine 2% Cloths 1 Application(s) Topical daily  cloNIDine 0.2 milliGRAM(s) Oral at bedtime  dextrose 5%. 1000 milliLiter(s) (50 mL/Hr) IV Continuous <Continuous>  dextrose 5%. 1000 milliLiter(s) (100 mL/Hr) IV Continuous <Continuous>  dextrose 50% Injectable 12.5 Gram(s) IV Push once  dextrose 50% Injectable 25 Gram(s) IV Push once  dextrose 50% Injectable 25 Gram(s) IV Push once  doxazosin 4 milliGRAM(s) Oral at bedtime  enoxaparin Injectable 40 milliGRAM(s) SubCutaneous daily  furosemide   Injectable 40 milliGRAM(s) IV Push daily  insulin lispro (HumaLOG) corrective regimen sliding scale   SubCutaneous three times a day before meals  insulin lispro (HumaLOG) corrective regimen sliding scale   SubCutaneous at bedtime  levothyroxine 25 MICROGram(s) Oral daily  metoprolol succinate ER 50 milliGRAM(s) Oral daily  senna 2 Tablet(s) Oral at bedtime    MEDICATIONS  (PRN):  acetaminophen   Tablet .. 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

## 2020-07-13 NOTE — PROGRESS NOTE ADULT - ASSESSMENT
Obesity hypoventialtion symdrome: today at baseline PCO2 in 70's, compensated  Anemia  Patient with supine sats in low 80's tday: likely due to basilar atelectasis/poor inspiraion    7/8: Tachyneic with paradox: likely resp muscle fatigue  7/10: CHF with pulm edema/effusions on CT. No recent TTE  7/11: looks a bit better, though desaturates on nasal O2. PBNP mildly elevated    REC    HOld lasix and monitor creatinine  Patient not agreeable to bone marrow at this time: would hold - high resp risk with anesthesia  COntinue nasal oxygen at day and BIPAP at night  TTE pending  DC planning once TTE complete and creatinine downtrending  WIll address bone marrow deferral with Heme

## 2020-07-13 NOTE — PROGRESS NOTE ADULT - ASSESSMENT
73 y/o female PMHx FREDI, obesity hypoventilation syndrome, DM2, HLD, LE lymphedema, on CPAP at night recently noncompliant, presents w symptomatic anemia    #Anemia  occult neg  GI recs appreciated-pt refused egd   r/o MDS   patient will require BMB but currently refusing, can pursue this as outpatient    # acute hypercapnic and hypoxic resp failure   # obesity hypoventilation  # pleural effusions  appreciate pulm recs  cont bipap prn, supplemental O2, currently on 6L, titrate as tolerated  CT chest pleural effusions, ground glass  cont lasix 40mg IV qd  pending TTE, TTE nml systolic fxn from 2/2020    # LANE  creat increased likely 2/2 lasix    # HTN   on clonidine and toprol XL    # hypothyroidism  cont synthroid 25mcg  tsh wnl    # DM2   hold oral meds  SSI  a1c 6.7    DVT ppx lovenox    patient requesting to leave hospital. patient is not medically cleared for DC.  discussed leaving against medical advice with patient. patient is aware of risks if she chooses to leave the hospital, including death.  pt understands patient is responsible if for any outcomes if she leaves AMA. Hospital and practitioners are not liable. 75 y/o female PMHx FREDI, obesity hypoventilation syndrome, DM2, HLD, LE lymphedema, on CPAP at night recently noncompliant, presents w symptomatic anemia    #Anemia  occult neg  GI recs appreciated-pt refused egd   r/o MDS   patient will require BMB but currently refusing, can pursue this as outpatient    # acute hypercapnic and hypoxic resp failure   # obesity hypoventilation  # pleural effusions  appreciate pulm recs  cont bipap prn, supplemental O2, currently on 6L, titrate as tolerated  CT chest pleural effusions, ground glass  hold lasix  pending TTE, TTE nml systolic fxn from 2/2020    # LANE  creat increased likely 2/2 lasix    # HTN   on clonidine and toprol XL    # hypothyroidism  cont synthroid 25mcg  tsh wnl    # DM2   hold oral meds  SSI  a1c 6.7    DVT ppx lovenox    patient requesting to leave hospital. patient is not medically cleared for DC.  discussed leaving against medical advice with patient. patient is aware of risks if she chooses to leave the hospital, including death.  pt understands patient is responsible if for any outcomes if she leaves AMA. Hospital and practitioners are not liable.

## 2020-07-14 ENCOUNTER — TRANSCRIPTION ENCOUNTER (OUTPATIENT)
Age: 75
End: 2020-07-14

## 2020-07-14 VITALS
SYSTOLIC BLOOD PRESSURE: 129 MMHG | OXYGEN SATURATION: 92 % | DIASTOLIC BLOOD PRESSURE: 61 MMHG | HEART RATE: 78 BPM | RESPIRATION RATE: 18 BRPM | TEMPERATURE: 98 F

## 2020-07-14 LAB
ANION GAP SERPL CALC-SCNC: 6 MMOL/L — SIGNIFICANT CHANGE UP (ref 5–17)
BUN SERPL-MCNC: 30 MG/DL — HIGH (ref 7–23)
CALCIUM SERPL-MCNC: 10 MG/DL — SIGNIFICANT CHANGE UP (ref 8.4–10.5)
CHLORIDE SERPL-SCNC: 98 MMOL/L — SIGNIFICANT CHANGE UP (ref 96–108)
CO2 SERPL-SCNC: 42 MMOL/L — HIGH (ref 22–31)
CREAT SERPL-MCNC: 1.35 MG/DL — HIGH (ref 0.5–1.3)
GLUCOSE BLDC GLUCOMTR-MCNC: 112 MG/DL — HIGH (ref 70–99)
GLUCOSE BLDC GLUCOMTR-MCNC: 134 MG/DL — HIGH (ref 70–99)
GLUCOSE SERPL-MCNC: 102 MG/DL — HIGH (ref 70–99)
HCT VFR BLD CALC: 27.2 % — LOW (ref 34.5–45)
HGB BLD-MCNC: 7.7 G/DL — LOW (ref 11.5–15.5)
MCHC RBC-ENTMCNC: 28.3 GM/DL — LOW (ref 32–36)
MCHC RBC-ENTMCNC: 31.4 PG — SIGNIFICANT CHANGE UP (ref 27–34)
MCV RBC AUTO: 111 FL — HIGH (ref 80–100)
NRBC # BLD: 0 /100 WBCS — SIGNIFICANT CHANGE UP (ref 0–0)
PLATELET # BLD AUTO: 205 K/UL — SIGNIFICANT CHANGE UP (ref 150–400)
POTASSIUM SERPL-MCNC: 4.1 MMOL/L — SIGNIFICANT CHANGE UP (ref 3.5–5.3)
POTASSIUM SERPL-SCNC: 4.1 MMOL/L — SIGNIFICANT CHANGE UP (ref 3.5–5.3)
RBC # BLD: 2.45 M/UL — LOW (ref 3.8–5.2)
RBC # FLD: 16.7 % — HIGH (ref 10.3–14.5)
SODIUM SERPL-SCNC: 146 MMOL/L — HIGH (ref 135–145)
WBC # BLD: 6.98 K/UL — SIGNIFICANT CHANGE UP (ref 3.8–10.5)
WBC # FLD AUTO: 6.98 K/UL — SIGNIFICANT CHANGE UP (ref 3.8–10.5)

## 2020-07-14 PROCEDURE — 82272 OCCULT BLD FECES 1-3 TESTS: CPT

## 2020-07-14 PROCEDURE — 82330 ASSAY OF CALCIUM: CPT

## 2020-07-14 PROCEDURE — 84443 ASSAY THYROID STIM HORMONE: CPT

## 2020-07-14 PROCEDURE — 83036 HEMOGLOBIN GLYCOSYLATED A1C: CPT

## 2020-07-14 PROCEDURE — 86901 BLOOD TYPING SEROLOGIC RH(D): CPT

## 2020-07-14 PROCEDURE — 80048 BASIC METABOLIC PNL TOTAL CA: CPT

## 2020-07-14 PROCEDURE — 83605 ASSAY OF LACTIC ACID: CPT

## 2020-07-14 PROCEDURE — 36430 TRANSFUSION BLD/BLD COMPNT: CPT

## 2020-07-14 PROCEDURE — 97162 PT EVAL MOD COMPLEX 30 MIN: CPT

## 2020-07-14 PROCEDURE — 82746 ASSAY OF FOLIC ACID SERUM: CPT

## 2020-07-14 PROCEDURE — 93970 EXTREMITY STUDY: CPT

## 2020-07-14 PROCEDURE — 94640 AIRWAY INHALATION TREATMENT: CPT

## 2020-07-14 PROCEDURE — 86850 RBC ANTIBODY SCREEN: CPT

## 2020-07-14 PROCEDURE — 83550 IRON BINDING TEST: CPT

## 2020-07-14 PROCEDURE — 82607 VITAMIN B-12: CPT

## 2020-07-14 PROCEDURE — 83540 ASSAY OF IRON: CPT

## 2020-07-14 PROCEDURE — 85027 COMPLETE CBC AUTOMATED: CPT

## 2020-07-14 PROCEDURE — 86900 BLOOD TYPING SEROLOGIC ABO: CPT

## 2020-07-14 PROCEDURE — 80053 COMPREHEN METABOLIC PANEL: CPT

## 2020-07-14 PROCEDURE — 84295 ASSAY OF SERUM SODIUM: CPT

## 2020-07-14 PROCEDURE — 97116 GAIT TRAINING THERAPY: CPT

## 2020-07-14 PROCEDURE — C8929: CPT

## 2020-07-14 PROCEDURE — 85610 PROTHROMBIN TIME: CPT

## 2020-07-14 PROCEDURE — 82947 ASSAY GLUCOSE BLOOD QUANT: CPT

## 2020-07-14 PROCEDURE — 86769 SARS-COV-2 COVID-19 ANTIBODY: CPT

## 2020-07-14 PROCEDURE — 86923 COMPATIBILITY TEST ELECTRIC: CPT

## 2020-07-14 PROCEDURE — 82728 ASSAY OF FERRITIN: CPT

## 2020-07-14 PROCEDURE — 36600 WITHDRAWAL OF ARTERIAL BLOOD: CPT

## 2020-07-14 PROCEDURE — 83880 ASSAY OF NATRIURETIC PEPTIDE: CPT

## 2020-07-14 PROCEDURE — 85730 THROMBOPLASTIN TIME PARTIAL: CPT

## 2020-07-14 PROCEDURE — 93005 ELECTROCARDIOGRAM TRACING: CPT

## 2020-07-14 PROCEDURE — 84132 ASSAY OF SERUM POTASSIUM: CPT

## 2020-07-14 PROCEDURE — 71045 X-RAY EXAM CHEST 1 VIEW: CPT

## 2020-07-14 PROCEDURE — 85014 HEMATOCRIT: CPT

## 2020-07-14 PROCEDURE — 82962 GLUCOSE BLOOD TEST: CPT

## 2020-07-14 PROCEDURE — 83735 ASSAY OF MAGNESIUM: CPT

## 2020-07-14 PROCEDURE — 94660 CPAP INITIATION&MGMT: CPT

## 2020-07-14 PROCEDURE — 96374 THER/PROPH/DIAG INJ IV PUSH: CPT

## 2020-07-14 PROCEDURE — 85045 AUTOMATED RETICULOCYTE COUNT: CPT

## 2020-07-14 PROCEDURE — U0003: CPT

## 2020-07-14 PROCEDURE — 97530 THERAPEUTIC ACTIVITIES: CPT

## 2020-07-14 PROCEDURE — P9016: CPT

## 2020-07-14 PROCEDURE — 82803 BLOOD GASES ANY COMBINATION: CPT

## 2020-07-14 PROCEDURE — 82435 ASSAY OF BLOOD CHLORIDE: CPT

## 2020-07-14 PROCEDURE — 71250 CT THORAX DX C-: CPT

## 2020-07-14 PROCEDURE — 97110 THERAPEUTIC EXERCISES: CPT

## 2020-07-14 PROCEDURE — 99291 CRITICAL CARE FIRST HOUR: CPT | Mod: 25

## 2020-07-14 RX ORDER — FUROSEMIDE 40 MG
1 TABLET ORAL
Qty: 0 | Refills: 0 | DISCHARGE

## 2020-07-14 RX ADMIN — Medication 81 MILLIGRAM(S): at 12:53

## 2020-07-14 RX ADMIN — Medication 50 MILLIGRAM(S): at 05:00

## 2020-07-14 RX ADMIN — Medication 25 MICROGRAM(S): at 05:00

## 2020-07-14 RX ADMIN — Medication 300 MILLIGRAM(S): at 12:53

## 2020-07-14 NOTE — DISCHARGE NOTE NURSING/CASE MANAGEMENT/SOCIAL WORK - NSDCPNDISPN_GEN_ALL_CORE
Activities of daily living, including home environment that might     exacerbate pain or reduce effectiveness of the pain management plan of care as well as strategies to address these issues/Safe use, storage and disposal of opioids when prescribed/Side effects of pain management treatment/Opioids not applicable/not prescribed/Education provided on the pain management plan of care

## 2020-07-14 NOTE — PROGRESS NOTE ADULT - SUBJECTIVE AND OBJECTIVE BOX
Patient is a 74y old  Female who presents with a chief complaint of low blood count (13 Jul 2020 13:39)      SUBJECTIVE / OVERNIGHT EVENTS:    Patient seen and examined.   denies cp sob. eager to go home. on 6L NC.     Vital Signs Last 24 Hrs  T(C): 36.5 (14 Jul 2020 07:35), Max: 36.9 (13 Jul 2020 20:02)  T(F): 97.7 (14 Jul 2020 07:35), Max: 98.5 (13 Jul 2020 20:02)  HR: 83 (14 Jul 2020 09:57) (77 - 90)  BP: 105/66 (14 Jul 2020 07:35) (105/66 - 145/71)  BP(mean): --  RR: 18 (14 Jul 2020 07:35) (18 - 20)  SpO2: 91% (14 Jul 2020 09:57) (91% - 95%)  I&O's Summary    13 Jul 2020 07:01  -  14 Jul 2020 07:00  --------------------------------------------------------  IN: 960 mL / OUT: 800 mL / NET: 160 mL    14 Jul 2020 07:01  -  14 Jul 2020 11:17  --------------------------------------------------------  IN: 180 mL / OUT: 0 mL / NET: 180 mL        PE:  GENERAL: NAD, AAOx3, obese  HEAD:  Atraumatic, Normocephalic  CHEST/LUNG: decreased sounds 2/2 body habitus  HEART: Regular rate and rhythm; no murmur  ABDOMEN: Soft, Nontender, Nondistended; Bowel sounds present  EXTREMITIES:  2+ Peripheral Pulses, chronic lymphedema  SKIN: No rashes or lesions  NEURO: No focal deficits    LABS:                        7.7    6.98  )-----------( 205      ( 14 Jul 2020 09:05 )             27.2     07-14    146<H>  |  98  |  30<H>  ----------------------------<  102<H>  4.1   |  42<H>  |  1.35<H>    Ca    10.0      14 Jul 2020 09:05        CAPILLARY BLOOD GLUCOSE      POCT Blood Glucose.: 112 mg/dL (14 Jul 2020 06:23)  POCT Blood Glucose.: 207 mg/dL (13 Jul 2020 21:51)  POCT Blood Glucose.: 214 mg/dL (13 Jul 2020 21:42)  POCT Blood Glucose.: 130 mg/dL (13 Jul 2020 17:53)  POCT Blood Glucose.: 137 mg/dL (13 Jul 2020 12:07)            RADIOLOGY & ADDITIONAL TESTS:    Imaging Personally Reviewed:  [x] YES  [ ] NO    Consultant(s) Notes Reviewed:  [x] YES  [ ] NO    MEDICATIONS  (STANDING):  allopurinol 300 milliGRAM(s) Oral daily  aspirin  chewable 81 milliGRAM(s) Oral daily  atorvastatin 10 milliGRAM(s) Oral at bedtime  bimatoprost 0.01% Ophthalmic Solution 1 Drop(s) Both EYES at bedtime  chlorhexidine 2% Cloths 1 Application(s) Topical daily  cloNIDine 0.2 milliGRAM(s) Oral at bedtime  dextrose 5%. 1000 milliLiter(s) (50 mL/Hr) IV Continuous <Continuous>  dextrose 5%. 1000 milliLiter(s) (100 mL/Hr) IV Continuous <Continuous>  dextrose 50% Injectable 12.5 Gram(s) IV Push once  dextrose 50% Injectable 25 Gram(s) IV Push once  dextrose 50% Injectable 25 Gram(s) IV Push once  doxazosin 4 milliGRAM(s) Oral at bedtime  enoxaparin Injectable 40 milliGRAM(s) SubCutaneous daily  insulin lispro (HumaLOG) corrective regimen sliding scale   SubCutaneous three times a day before meals  insulin lispro (HumaLOG) corrective regimen sliding scale   SubCutaneous at bedtime  levothyroxine 25 MICROGram(s) Oral daily  metoprolol succinate ER 50 milliGRAM(s) Oral daily  senna 2 Tablet(s) Oral at bedtime    MEDICATIONS  (PRN):  acetaminophen   Tablet .. 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

## 2020-07-14 NOTE — PROGRESS NOTE ADULT - NSHPATTENDINGPLANDISCUSS_GEN_ALL_CORE
pt, np
pt, np
Medical team
pt, np

## 2020-07-14 NOTE — PROGRESS NOTE ADULT - SUBJECTIVE AND OBJECTIVE BOX
Follow-up Pulm Progress Note  Bharat Castellon MD  [phone]    Afebrile with stable resp status  Creatinine decreased to 1.34  TTE: Mod AS and enlarged LA; nl LVEF    Vital Signs Last 24 Hrs  T(C): 36.5 (14 Jul 2020 07:35), Max: 36.9 (13 Jul 2020 20:02)  T(F): 97.7 (14 Jul 2020 07:35), Max: 98.5 (13 Jul 2020 20:02)  HR: 83 (14 Jul 2020 09:57) (77 - 90)  BP: 105/66 (14 Jul 2020 07:35) (105/66 - 145/71)  BP(mean): --  RR: 18 (14 Jul 2020 07:35) (18 - 20)  SpO2: 91% (14 Jul 2020 09:57) (91% - 95%)                          7.7    6.98  )-----------( 205      ( 14 Jul 2020 09:05 )             27.2       07-14    146<H>  |  98  |  30<H>  ----------------------------<  102<H>  4.1   |  42<H>  |  1.35<H>    Ca    10.0      14 Jul 2020 09:05      Physical Examination:  GEN: Alert on Nasal BIPAP, no distress  PULM: No wheeze or rhonchi; distant  CVS: Regular rate and rhythm, no murmurs, rubs, or gallops  ABD: Soft, non-tender  EXT:  Bilateral LE edema    RADIOLOGY REVIEWED  CXR:    CT chest:    PROCEDURE DATE:  07/09/2020        INTERPRETATION:  CLINICAL INFORMATION: Shortness of breath. Lymphedema.    COMPARISON: Chest x-ray 7/5/2020.    PROCEDURE:   CT of the Chest was performed without intravenous contrast.  Sagittal and coronal reformats were performed.    FINDINGS:    LUNGS, AIRWAYS, AND PLEURA: Bilateral groundglass opacities.  Bilateral pleural effusions with adjacent compressive atelectasis. The airways are unremarkable.    MEDIASTINUM AND EMBER: The chest lymph nodes measure less than 10 mm in the short axis. The esophagus and thyroid gland are unremarkable.    VESSELS: Aorta is normal in caliber. Aortic calcifications. The pulmonary artery is unremarkable.    HEART: Cardiomegaly. Trace pericardial effusion. Coronary artery calcifications.    CHEST WALL AND LOWER NECK: Partially visualized cervical  lymph node measures 2.4 x 1.8 cm. Multiple lymph nodes in the left axillary region.    VISUALIZED UPPER ABDOMEN: Within normal limits.    BONES: Degenerative changes. Calcified loose body posterior to the T3 vertebral body, right of midline, appears to narrow the canal at this level.    IMPRESSION:   1.  Bilateral pleural effusions and lower lobe passive atelectasis.  2.  Bilateral groundglass opacities could occur in the setting of pulmonary edema, however, other etiologies could look similar.  TTE:    PROCEDURE: Transthoracic echocardiogram with 2-D, M-Mode  and complete spectral and color flow Doppler. Verbal  consent was obtained for injection of  Ultrasonic Enhancing  Agent following a discussion of risks and benefits.  Following intravenous injection of Ultrasonic Enhancing  Agent , harmonic imaging was performed.  INDICATION: Heart failure, unspecified (I50.9)  ------------------------------------------------------------------------  Dimensions:    Normal Values:  LA:     3.6    2.0 - 4.0 cm  Ao:     2.9    2.0 - 3.8 cm  SEPTUM: 0.7    0.6 - 1.2 cm  PWT:    0.8    0.6 - 1.1 cm  LVIDd:  5.1    3.0 - 5.6 cm  LVIDs:  3.1    1.8 - 4.0 cm  Derived variables:  LVMI: 58 g/m2  RWT: 0.31  Fractional short: 39 %  EF (Visual Estimate): 75-80 %  Doppler Peak Velocity (m/sec): AoV=2.5  ------------------------------------------------------------------------  Observations:  Mitral Valve: Mitral annular calcification, otherwise  normal mitral valve. Minimal mitral regurgitation.  Aortic Valve/Aorta: Aortic valve not well visualized;  appears calcified. Peak transaortic valve gradient equals  25 mm Hg, mean transaortic valve gradient equals 15 mm Hg,  estimated aortic valve area equals 1.3 sqcm (by continuity  equation), aortic valve velocity time integral equals 50  cm, consistent with moderate aortic stenosis. No aortic  valve regurgitation seen. Peak left ventricular outflow  tract gradient equals 7 mm Hg, mean gradient is equal to 4  mm Hg, LVOT velocity time integral equals 26 cm.  Aortic Root: 2.9 cm.  LVOT diameter: 1.8 cm.  Left Atrium: Mildly dilated left atrium.LA volume index =  40 cc/m2.  Left Ventricle: Endocardial visualization enhanced with  intravenous injection of Ultrasonic Enhancing Agent  (Definity). Hyperdynamic left ventricular systolic  function. No left ventricular thrombus. Normal left  ventricular internal dimensions and wall thicknesses.  Indeterminate diastolic function.  Right Heart: Normal right atrium. Right ventricular  enlargement with normal right ventricular systolic  function. Normal tricuspid valve. Minimal tricuspid  regurgitation. Normal pulmonic valve. No pulmonic  regurgitation.  Pericardium/Pleura: Thickened pericardium.  Small  pericardial effusion.  The largest pocket measures 0.5cm.  No evidence of right atrial or right ventricular collapse.  ------------------------------------------------------------------------  Conclusions:  1. Mitral annular calcification, otherwise normal mitral  valve. Minimal mitral regurgitation.  2. Aortic valve not well visualized; appears calcified.  Peak transaortic valve gradient equals 25 mm Hg, mean  transaortic valve gradient equals 15 mm Hg, estimated  aortic valve area equals 1.3 sqcm (by continuity equation),  aortic valve velocity time integral equals 50 cm,  consistent with moderate aortic stenosis. No aortic valve  regurgitation seen.  3. Endocardial visualization enhanced with intravenous  injection of Ultrasonic Enhancing Agent (Definity).  Hyperdynamic left ventricular systolic function. No left  ventricular thrombus.  4. Right ventricular enlargement with normal right  ventricular systolic function.

## 2020-07-14 NOTE — PROGRESS NOTE ADULT - ASSESSMENT
75 y/o female PMHx FREDI, obesity hypoventilation syndrome, DM2, HLD, LE lymphedema, on CPAP at night recently noncompliant, presents w symptomatic anemia    #Anemia  occult neg  GI recs appreciated-pt refused egd  r/o MDS   patient will require BMB but currently refusing, can pursue this as outpatient    # acute hypercapnic and hypoxic resp failure   # obesity hypoventilation  # pleural effusions  appreciate pulm recs  cont bipap prn, supplemental O2, currently on 6L, titrate as tolerated  CT chest pleural effusions, ground glass  creat downtrending, can cont lasix 20mg every other day outpt  TTE shows mod AS  outpt fu with cardio Dr. Pritchett    # LANE  downtrending    # HTN   on clonidine and toprol XL    # hypothyroidism  cont synthroid 25mcg  tsh wnl    # DM2   hold oral meds  SSI  a1c 6.7    DVT ppx lovenox    dispo: pending pulm clx, dc planning

## 2020-07-14 NOTE — CHART NOTE - NSCHARTNOTEFT_GEN_A_CORE
Request from Dr. Lin to facilitate patient discharge.  Medication reconciliation reviewed, revised, and resolved with Dr. Lin, who has medically cleared patient for discharge with follow up as advised.  Please refer to discharge note for detailed hospital course.

## 2020-07-14 NOTE — PROGRESS NOTE ADULT - PROVIDER SPECIALTY LIST ADULT
Internal Medicine
Pulmonology
Internal Medicine
Pulmonology
Internal Medicine
Cardiology

## 2020-07-14 NOTE — DISCHARGE NOTE NURSING/CASE MANAGEMENT/SOCIAL WORK - PATIENT PORTAL LINK FT
You can access the FollowMyHealth Patient Portal offered by [facility] by registering at the following website: [email or web address]. By joining [facility]’s FollowMyHealth portal, you will also be able to view your health information using other applications (apps) compatible with our system.

## 2020-07-14 NOTE — PROGRESS NOTE ADULT - REASON FOR ADMISSION
low blood count

## 2020-07-14 NOTE — CHART NOTE - NSCHARTNOTESELECT_GEN_ALL_CORE
Event Note
Event Note
ABG/Event Note
Discharge Note
Event Note
Nutrition Services
hypernatremia/Event Note

## 2020-07-14 NOTE — PROGRESS NOTE ADULT - ASSESSMENT
Obesity hypoventialtion symdrome: today at baseline PCO2 in 70's, compensated  Anemia  Patient with supine sats in low 80's tday: likely due to basilar atelectasis/poor inspiraion    7/8: Tachyneic with paradox: likely resp muscle fatigue  7/10: CHF with pulm edema/effusions on CT. No recent TTE  7/11: looks a bit better, though desaturates on nasal O2. PBNP mildly elevated  7/14: Mod AS, creatinine improving    REC    Clear for DC home on BIPAP\  Agree with alternate day lasix 20 for HFPEF due to mod AS  F/U Dr Sullivan, [facility]  No plans for BM biopsy at this time: patient refusing

## 2020-08-22 NOTE — DISCHARGE NOTE NURSING/CASE MANAGEMENT/SOCIAL WORK - NSDCPEWEB_GEN_ALL_CORE
[place] website --- [email or web address] website --- [email or web address]
Neck pain

## 2021-01-30 NOTE — PHYSICAL THERAPY INITIAL EVALUATION ADULT - PATIENT PROFILE REVIEW, REHAB EVAL
No rooms available in ICU, patient informed, ICU charge nurse stated they have a patient that will be transferred to the floor and once that happens the room needs to be cleaned and then patient can be brought to ICU    Copy of patient's medication list sent to pharmacy   yes

## 2021-09-05 NOTE — PHYSICAL THERAPY INITIAL EVALUATION ADULT - DISCHARGE DISPOSITION, PT EVAL
I have personally evaluated and examined the patient. The Attending was available to me as a supervising provider if needed. rehabilitation facility

## 2023-04-01 NOTE — PATIENT PROFILE ADULT - SAFE PLACE TO LIVE
Problem: Discharge Planning  Goal: Discharge to home or other facility with appropriate resources  Outcome: Progressing  Flowsheets (Taken 3/31/2023 1930)  Discharge to home or other facility with appropriate resources:   Identify barriers to discharge with patient and caregiver   Identify discharge learning needs (meds, wound care, etc)     Problem: Safety - Adult  Goal: Free from fall injury  Outcome: Progressing no

## 2025-07-20 NOTE — H&P ADULT - NSHPPHYSICALEXAM_GEN_ALL_CORE
"Vitals are Temp: 98  F (36.7  C) Temp src: Oral BP: 128/66 Pulse: 72   Resp: 16 SpO2: 92 %.  Patient is Alert and Oriented x4. They are independent with no assistive devices . They are complaining of 3/10 pain in their back and a headache.  Tylenol given for pain.  Patient is Saline locked. Denies nausea/dizziness/SOB. Mckenzie in place. CBI at slow rate, output yellow with a pink tint. No clots noted. Eliquis restarted yesterday evening. Urology following. Plan is for urology to reassess this morning.      Goal Outcome Evaluation:      Plan of Care Reviewed With: patient    Overall Patient Progress: improving    Outcome Evaluation: CBI at slow rate, output yellow/pinkish.      Problem: Adult Inpatient Plan of Care  Goal: Plan of Care Review  Description: The Plan of Care Review/Shift note should be completed every shift.  The Outcome Evaluation is a brief statement about your assessment that the patient is improving, declining, or no change.  This information will be displayed automatically on your shift  note.  Outcome: Progressing  Flowsheets (Taken 7/20/2025 2245)  Outcome Evaluation: CBI at slow rate, output yellow/pinkish.  Plan of Care Reviewed With: patient  Overall Patient Progress: improving  Goal: Patient-Specific Goal (Individualized)  Description: You can add care plan individualizations to a care plan. Examples of Individualization might be:  \"Parent requests to be called daily at 9am for status\", \"I have a hard time hearing out of my right ear\", or \"Do not touch me to wake me up as it startles  me\".  Outcome: Progressing  Goal: Absence of Hospital-Acquired Illness or Injury  Outcome: Progressing  Intervention: Identify and Manage Fall Risk  Recent Flowsheet Documentation  Taken 7/19/2025 1953 by Mady Borges RN  Safety Promotion/Fall Prevention:   clutter free environment maintained   nonskid shoes/slippers when out of bed   room near nurse's station   safety round/check completed  Intervention: " Prevent Skin Injury  Recent Flowsheet Documentation  Taken 7/19/2025 1953 by Mady Borges RN  Body Position: position changed independently  Intervention: Prevent and Manage VTE (Venous Thromboembolism) Risk  Recent Flowsheet Documentation  Taken 7/19/2025 1953 by Mady Borges RN  VTE Prevention/Management: SCDs off (sequential compression devices)  Goal: Optimal Comfort and Wellbeing  Outcome: Progressing  Goal: Readiness for Transition of Care  Outcome: Progressing     Problem: Delirium  Goal: Optimal Coping  Outcome: Progressing  Goal: Improved Behavioral Control  Outcome: Progressing  Intervention: Minimize Safety Risk  Recent Flowsheet Documentation  Taken 7/19/2025 1953 by Mady Borges RN  Enhanced Safety Measures: review medications for side effects with activity  Goal: Improved Attention and Thought Clarity  Outcome: Progressing  Goal: Improved Sleep  Outcome: Progressing     Problem: Comorbidity Management  Goal: Maintenance of Asthma Control  Outcome: Progressing  Intervention: Maintain Asthma Symptom Control  Recent Flowsheet Documentation  Taken 7/19/2025 1953 by Mady Borges RN  Medication Review/Management: medications reviewed  Goal: Maintenance of Behavioral Health Symptom Control  Outcome: Progressing  Intervention: Maintain Behavioral Health Symptom Control  Recent Flowsheet Documentation  Taken 7/19/2025 1953 by Mady Borges RN  Medication Review/Management: medications reviewed  Goal: Maintenance of COPD Symptom Control  Outcome: Progressing  Intervention: Maintain COPD Symptom Control  Recent Flowsheet Documentation  Taken 7/19/2025 1953 by Mady Borges RN  Medication Review/Management: medications reviewed  Goal: Blood Glucose Levels Within Targeted Range  Outcome: Progressing  Intervention: Monitor and Manage Glycemia  Recent Flowsheet Documentation  Taken 7/19/2025 1953 by Mady Borges RN  Medication Review/Management: medications  reviewed  Goal: Maintenance of Heart Failure Symptom Control  Outcome: Progressing  Intervention: Maintain Heart Failure Management  Recent Flowsheet Documentation  Taken 7/19/2025 1953 by aMdy Borges RN  Medication Review/Management: medications reviewed  Goal: Blood Pressure in Desired Range  Outcome: Progressing  Intervention: Maintain Blood Pressure Management  Recent Flowsheet Documentation  Taken 7/19/2025 1953 by Mady Borges RN  Medication Review/Management: medications reviewed  Goal: Maintenance of Osteoarthritis Symptom Control  Outcome: Progressing  Intervention: Maintain Osteoarthritis Symptom Control  Recent Flowsheet Documentation  Taken 7/19/2025 1953 by Mady Borges RN  Activity Management: up ad chelita  Medication Review/Management: medications reviewed  Goal: Bariatric Home Regimen Maintained  Outcome: Progressing  Intervention: Maintain and Manage Postbariatric Surgery Care  Recent Flowsheet Documentation  Taken 7/19/2025 1953 by Mady Borges RN  Medication Review/Management: medications reviewed  Goal: Maintenance of Seizure Control  Outcome: Progressing  Intervention: Maintain Seizure Symptom Control  Recent Flowsheet Documentation  Taken 7/19/2025 1953 by Mady Borges RN  Medication Review/Management: medications reviewed              PHYSICAL EXAM:  GENERAL: NAD, lying in bed comfortably  HEAD:  Atraumatic, Normocephalic  EYES: EOMI, PERRLA, conjunctiva and sclera clear  ENT: Moist mucous membranes  NECK: Supple, No JVD  CHEST/LUNG: Clear to auscultation bilaterally; No rales, rhonchi, wheezing, or rubs. Unlabored respirations  HEART: Regular rate and rhythm; No murmurs, rubs, or gallops  ABDOMEN: Bowel sounds present; Soft, Nontender, Nondistended   EXTREMITIES:  2+ Peripheral Pulses, brisk capillary refill. No clubbing, cyanosis, or edema  NERVOUS SYSTEM:  Alert & Oriented X3, speech clear. No deficits   MSK: FROM all 4 extremities, full and equal strength  SKIN: No rashes or lesions PHYSICAL EXAM:  GENERAL: Obese femlae  HEAD:  Atraumatic, Normocephalic  EYES: EOMI, PERRLA, conjunctiva and sclera clear  ENT: Moist mucous membranes  NECK: Supple, No JVD  CHEST/LUNG: Clear to auscultation bilaterally; No rales, rhonchi, wheezing, or rubs. Unlabored respirations  HEART: Regular rate and rhythm; No murmurs, rubs, or gallops  ABDOMEN: Bowel sounds present; Soft, Nontender, Nondistended   EXTREMITIES:  2+ Peripheral Pulses, brisk capillary refill. peripherla edema  NERVOUS SYSTEM:  Alert & Oriented X3, speech clear. No deficits   MSK: FROM all 4 extremities, full and equal strength  SKIN: No rashes or lesions PHYSICAL EXAM:  GENERAL: Obese female  HEAD:  Atraumatic, Normocephalic  EYES: EOMI, PERRLA, conjunctiva and sclera clear  ENT: Moist mucous membranes  NECK: Supple, No JVD  CHEST/LUNG: Clear to auscultation bilaterally; No rales, rhonchi, wheezing, or rubs. Unlabored respirations  HEART: Regular rate and rhythm; No murmurs, rubs, or gallops  ABDOMEN: Bowel sounds present; Soft, Nontender, Nondistended   EXTREMITIES:  2+ Peripheral Pulses, brisk capillary refill. peripheral edema  NERVOUS SYSTEM:  Alert & Oriented X3, speech clear. No deficits   MSK: FROM all 4 extremities, full and equal strength  SKIN: No rashes or lesions